# Patient Record
Sex: MALE | Race: WHITE | NOT HISPANIC OR LATINO | Employment: PART TIME | URBAN - METROPOLITAN AREA
[De-identification: names, ages, dates, MRNs, and addresses within clinical notes are randomized per-mention and may not be internally consistent; named-entity substitution may affect disease eponyms.]

---

## 2023-04-04 ENCOUNTER — HOSPITAL ENCOUNTER (INPATIENT)
Facility: HOSPITAL | Age: 53
LOS: 3 days | Discharge: HOME | DRG: 256 | End: 2023-04-07
Attending: INTERNAL MEDICINE | Admitting: INTERNAL MEDICINE
Payer: MEDICAID

## 2023-04-04 DIAGNOSIS — L97.519 TOE ULCER, RIGHT: ICD-10-CM

## 2023-04-04 DIAGNOSIS — S91.301A WOUND OF RIGHT FOOT: Primary | ICD-10-CM

## 2023-04-04 PROCEDURE — 63600175 PHARM REV CODE 636 W HCPCS: Performed by: INTERNAL MEDICINE

## 2023-04-04 PROCEDURE — 25000003 PHARM REV CODE 250: Performed by: INTERNAL MEDICINE

## 2023-04-04 PROCEDURE — 11000001 HC ACUTE MED/SURG PRIVATE ROOM

## 2023-04-04 RX ORDER — INSULIN ASPART 100 [IU]/ML
0-5 INJECTION, SOLUTION INTRAVENOUS; SUBCUTANEOUS
Status: DISCONTINUED | OUTPATIENT
Start: 2023-04-04 | End: 2023-04-05

## 2023-04-04 RX ORDER — GLUCAGON 1 MG
1 KIT INJECTION
Status: DISCONTINUED | OUTPATIENT
Start: 2023-04-04 | End: 2023-04-07 | Stop reason: HOSPADM

## 2023-04-04 RX ORDER — IBUPROFEN 200 MG
16 TABLET ORAL
Status: DISCONTINUED | OUTPATIENT
Start: 2023-04-04 | End: 2023-04-07 | Stop reason: HOSPADM

## 2023-04-04 RX ORDER — SODIUM CHLORIDE 9 MG/ML
INJECTION, SOLUTION INTRAVENOUS CONTINUOUS
Status: DISCONTINUED | OUTPATIENT
Start: 2023-04-04 | End: 2023-04-06

## 2023-04-04 RX ORDER — IBUPROFEN 200 MG
24 TABLET ORAL
Status: DISCONTINUED | OUTPATIENT
Start: 2023-04-04 | End: 2023-04-07 | Stop reason: HOSPADM

## 2023-04-04 RX ADMIN — SODIUM CHLORIDE: 9 INJECTION, SOLUTION INTRAVENOUS at 08:04

## 2023-04-04 RX ADMIN — INSULIN ASPART 2 UNITS: 100 INJECTION, SOLUTION INTRAVENOUS; SUBCUTANEOUS at 09:04

## 2023-04-04 RX ADMIN — PIPERACILLIN AND TAZOBACTAM 4.5 G: 4; .5 INJECTION, POWDER, LYOPHILIZED, FOR SOLUTION INTRAVENOUS; PARENTERAL at 09:04

## 2023-04-05 ENCOUNTER — ANESTHESIA (OUTPATIENT)
Dept: SURGERY | Facility: HOSPITAL | Age: 53
DRG: 256 | End: 2023-04-05
Payer: MEDICAID

## 2023-04-05 ENCOUNTER — ANESTHESIA EVENT (OUTPATIENT)
Dept: SURGERY | Facility: HOSPITAL | Age: 53
DRG: 256 | End: 2023-04-05
Payer: MEDICAID

## 2023-04-05 PROBLEM — R05.9 COUGH: Status: ACTIVE | Noted: 2023-04-05

## 2023-04-05 PROBLEM — E11.9 DIABETES MELLITUS, TYPE 2: Status: ACTIVE | Noted: 2023-04-05

## 2023-04-05 PROBLEM — D64.9 ANEMIA: Status: ACTIVE | Noted: 2023-04-05

## 2023-04-05 PROBLEM — I10 HYPERTENSION: Status: ACTIVE | Noted: 2023-04-05

## 2023-04-05 PROBLEM — D72.829 LEUKOCYTOSIS: Status: ACTIVE | Noted: 2023-04-05

## 2023-04-05 PROBLEM — S91.301A WOUND OF RIGHT FOOT: Status: ACTIVE | Noted: 2023-04-05

## 2023-04-05 PROBLEM — E88.09 HYPOALBUMINEMIA: Status: ACTIVE | Noted: 2023-04-05

## 2023-04-05 LAB
ALBUMIN SERPL BCP-MCNC: 1.9 G/DL (ref 3.5–5.2)
ALP SERPL-CCNC: 68 U/L (ref 55–135)
ALT SERPL W/O P-5'-P-CCNC: 17 U/L (ref 10–44)
ANION GAP SERPL CALC-SCNC: 10 MMOL/L (ref 8–16)
AST SERPL-CCNC: 11 U/L (ref 10–40)
BASOPHILS # BLD AUTO: 0.05 K/UL (ref 0–0.2)
BASOPHILS NFR BLD: 0.3 % (ref 0–1.9)
BILIRUB SERPL-MCNC: 0.3 MG/DL (ref 0.1–1)
BUN SERPL-MCNC: 12 MG/DL (ref 6–20)
CALCIUM SERPL-MCNC: 8.4 MG/DL (ref 8.7–10.5)
CHLORIDE SERPL-SCNC: 102 MMOL/L (ref 95–110)
CO2 SERPL-SCNC: 22 MMOL/L (ref 23–29)
CREAT SERPL-MCNC: 1.1 MG/DL (ref 0.5–1.4)
DIFFERENTIAL METHOD: ABNORMAL
EOSINOPHIL # BLD AUTO: 0.1 K/UL (ref 0–0.5)
EOSINOPHIL NFR BLD: 0.4 % (ref 0–8)
ERYTHROCYTE [DISTWIDTH] IN BLOOD BY AUTOMATED COUNT: 13.2 % (ref 11.5–14.5)
EST. GFR  (NO RACE VARIABLE): >60 ML/MIN/1.73 M^2
GLUCOSE SERPL-MCNC: 323 MG/DL (ref 70–110)
HCT VFR BLD AUTO: 31.1 % (ref 40–54)
HGB BLD-MCNC: 9.6 G/DL (ref 14–18)
IMM GRANULOCYTES # BLD AUTO: 0.19 K/UL (ref 0–0.04)
IMM GRANULOCYTES NFR BLD AUTO: 1.2 % (ref 0–0.5)
LYMPHOCYTES # BLD AUTO: 1.3 K/UL (ref 1–4.8)
LYMPHOCYTES NFR BLD: 8.3 % (ref 18–48)
MCH RBC QN AUTO: 24.3 PG (ref 27–31)
MCHC RBC AUTO-ENTMCNC: 30.9 G/DL (ref 32–36)
MCV RBC AUTO: 79 FL (ref 82–98)
MONOCYTES # BLD AUTO: 1.5 K/UL (ref 0.3–1)
MONOCYTES NFR BLD: 9.3 % (ref 4–15)
NEUTROPHILS # BLD AUTO: 12.9 K/UL (ref 1.8–7.7)
NEUTROPHILS NFR BLD: 80.5 % (ref 38–73)
NRBC BLD-RTO: 0 /100 WBC
PLATELET # BLD AUTO: 420 K/UL (ref 150–450)
PMV BLD AUTO: 8.9 FL (ref 9.2–12.9)
POCT GLUCOSE: 188 MG/DL (ref 70–110)
POCT GLUCOSE: 229 MG/DL (ref 70–110)
POCT GLUCOSE: 294 MG/DL (ref 70–110)
POCT GLUCOSE: 310 MG/DL (ref 70–110)
POTASSIUM SERPL-SCNC: 4.2 MMOL/L (ref 3.5–5.1)
PROT SERPL-MCNC: 6.5 G/DL (ref 6–8.4)
RBC # BLD AUTO: 3.95 M/UL (ref 4.6–6.2)
SODIUM SERPL-SCNC: 134 MMOL/L (ref 136–145)
WBC # BLD AUTO: 15.96 K/UL (ref 3.9–12.7)

## 2023-04-05 PROCEDURE — 63600175 PHARM REV CODE 636 W HCPCS: Performed by: NURSE ANESTHETIST, CERTIFIED REGISTERED

## 2023-04-05 PROCEDURE — 37000009 HC ANESTHESIA EA ADD 15 MINS: Performed by: SURGERY

## 2023-04-05 PROCEDURE — 01480 ANES OPEN PX LOWER L/A/F NOS: CPT | Mod: QZ,P3 | Performed by: NURSE ANESTHETIST, CERTIFIED REGISTERED

## 2023-04-05 PROCEDURE — 99499 NO LOS: ICD-10-PCS | Mod: ,,, | Performed by: SURGERY

## 2023-04-05 PROCEDURE — 25000003 PHARM REV CODE 250: Performed by: INTERNAL MEDICINE

## 2023-04-05 PROCEDURE — 99223 1ST HOSP IP/OBS HIGH 75: CPT | Mod: ,,, | Performed by: SURGERY

## 2023-04-05 PROCEDURE — 80053 COMPREHEN METABOLIC PANEL: CPT | Performed by: INTERNAL MEDICINE

## 2023-04-05 PROCEDURE — 11000001 HC ACUTE MED/SURG PRIVATE ROOM

## 2023-04-05 PROCEDURE — D9220AH HC ANESTHESIA PROFESSIONAL FEE: Mod: QZ,P3 | Performed by: NURSE ANESTHETIST, CERTIFIED REGISTERED

## 2023-04-05 PROCEDURE — 63600175 PHARM REV CODE 636 W HCPCS: Performed by: INTERNAL MEDICINE

## 2023-04-05 PROCEDURE — 85025 COMPLETE CBC W/AUTO DIFF WBC: CPT | Performed by: INTERNAL MEDICINE

## 2023-04-05 PROCEDURE — 25000003 PHARM REV CODE 250: Performed by: NURSE ANESTHETIST, CERTIFIED REGISTERED

## 2023-04-05 PROCEDURE — 88305 TISSUE EXAM BY PATHOLOGIST: CPT | Performed by: PATHOLOGY

## 2023-04-05 PROCEDURE — 37000008 HC ANESTHESIA 1ST 15 MINUTES: Performed by: SURGERY

## 2023-04-05 PROCEDURE — 25000003 PHARM REV CODE 250: Performed by: PHYSICIAN ASSISTANT

## 2023-04-05 PROCEDURE — 88305 TISSUE EXAM BY PATHOLOGIST: CPT | Mod: 26,,, | Performed by: PATHOLOGY

## 2023-04-05 PROCEDURE — 36000707: Performed by: SURGERY

## 2023-04-05 PROCEDURE — 99499 UNLISTED E&M SERVICE: CPT | Mod: ,,, | Performed by: SURGERY

## 2023-04-05 PROCEDURE — 88305 TISSUE EXAM BY PATHOLOGIST: ICD-10-PCS | Mod: 26,,, | Performed by: PATHOLOGY

## 2023-04-05 PROCEDURE — 71000033 HC RECOVERY, INTIAL HOUR: Performed by: SURGERY

## 2023-04-05 PROCEDURE — 36415 COLL VENOUS BLD VENIPUNCTURE: CPT | Performed by: INTERNAL MEDICINE

## 2023-04-05 PROCEDURE — 99223 PR INITIAL HOSPITAL CARE,LEVL III: ICD-10-PCS | Mod: ,,, | Performed by: SURGERY

## 2023-04-05 PROCEDURE — 36000706: Performed by: SURGERY

## 2023-04-05 RX ORDER — ACETAMINOPHEN 10 MG/ML
INJECTION, SOLUTION INTRAVENOUS
Status: DISCONTINUED | OUTPATIENT
Start: 2023-04-05 | End: 2023-04-05

## 2023-04-05 RX ORDER — ONDANSETRON HYDROCHLORIDE 2 MG/ML
INJECTION, SOLUTION INTRAMUSCULAR; INTRAVENOUS
Status: DISCONTINUED | OUTPATIENT
Start: 2023-04-05 | End: 2023-04-05

## 2023-04-05 RX ORDER — FENTANYL CITRATE 50 UG/ML
INJECTION, SOLUTION INTRAMUSCULAR; INTRAVENOUS
Status: DISCONTINUED | OUTPATIENT
Start: 2023-04-05 | End: 2023-04-05

## 2023-04-05 RX ORDER — INSULIN ASPART 100 [IU]/ML
0-10 INJECTION, SOLUTION INTRAVENOUS; SUBCUTANEOUS
Status: DISCONTINUED | OUTPATIENT
Start: 2023-04-05 | End: 2023-04-07 | Stop reason: HOSPADM

## 2023-04-05 RX ORDER — MIDAZOLAM HYDROCHLORIDE 1 MG/ML
INJECTION INTRAMUSCULAR; INTRAVENOUS
Status: DISCONTINUED | OUTPATIENT
Start: 2023-04-05 | End: 2023-04-05

## 2023-04-05 RX ORDER — LIDOCAINE HYDROCHLORIDE 20 MG/ML
INJECTION, SOLUTION EPIDURAL; INFILTRATION; INTRACAUDAL; PERINEURAL
Status: DISCONTINUED | OUTPATIENT
Start: 2023-04-05 | End: 2023-04-05

## 2023-04-05 RX ORDER — PROPOFOL 10 MG/ML
VIAL (ML) INTRAVENOUS
Status: DISCONTINUED | OUTPATIENT
Start: 2023-04-05 | End: 2023-04-05

## 2023-04-05 RX ORDER — AMLODIPINE BESYLATE 5 MG/1
5 TABLET ORAL DAILY
Status: DISCONTINUED | OUTPATIENT
Start: 2023-04-06 | End: 2023-04-06

## 2023-04-05 RX ORDER — ROCURONIUM BROMIDE 10 MG/ML
INJECTION, SOLUTION INTRAVENOUS
Status: DISCONTINUED | OUTPATIENT
Start: 2023-04-05 | End: 2023-04-05

## 2023-04-05 RX ORDER — SUCCINYLCHOLINE CHLORIDE 20 MG/ML
INJECTION INTRAMUSCULAR; INTRAVENOUS
Status: DISCONTINUED | OUTPATIENT
Start: 2023-04-05 | End: 2023-04-05

## 2023-04-05 RX ADMIN — INSULIN ASPART 2 UNITS: 100 INJECTION, SOLUTION INTRAVENOUS; SUBCUTANEOUS at 08:04

## 2023-04-05 RX ADMIN — PIPERACILLIN AND TAZOBACTAM 4.5 G: 4; .5 INJECTION, POWDER, LYOPHILIZED, FOR SOLUTION INTRAVENOUS; PARENTERAL at 12:04

## 2023-04-05 RX ADMIN — SODIUM CHLORIDE: 0.9 INJECTION, SOLUTION INTRAVENOUS at 02:04

## 2023-04-05 RX ADMIN — MIDAZOLAM HYDROCHLORIDE 2 MG: 1 INJECTION, SOLUTION INTRAMUSCULAR; INTRAVENOUS at 02:04

## 2023-04-05 RX ADMIN — PIPERACILLIN AND TAZOBACTAM 4.5 G: 4; .5 INJECTION, POWDER, LYOPHILIZED, FOR SOLUTION INTRAVENOUS; PARENTERAL at 08:04

## 2023-04-05 RX ADMIN — FENTANYL CITRATE 100 MCG: 0.05 INJECTION, SOLUTION INTRAMUSCULAR; INTRAVENOUS at 02:04

## 2023-04-05 RX ADMIN — ONDANSETRON 8 MG: 2 INJECTION INTRAMUSCULAR; INTRAVENOUS at 02:04

## 2023-04-05 RX ADMIN — ACETAMINOPHEN 1000 MG: 10 INJECTION, SOLUTION INTRAVENOUS at 02:04

## 2023-04-05 RX ADMIN — LIDOCAINE HYDROCHLORIDE 80 MG: 20 INJECTION, SOLUTION EPIDURAL; INFILTRATION; INTRACAUDAL; PERINEURAL at 02:04

## 2023-04-05 RX ADMIN — SODIUM CHLORIDE: 9 INJECTION, SOLUTION INTRAVENOUS at 04:04

## 2023-04-05 RX ADMIN — ROCURONIUM BROMIDE 5 MG: 10 INJECTION, SOLUTION INTRAVENOUS at 02:04

## 2023-04-05 RX ADMIN — VANCOMYCIN HYDROCHLORIDE 1500 MG: 1.5 INJECTION, POWDER, LYOPHILIZED, FOR SOLUTION INTRAVENOUS at 05:04

## 2023-04-05 RX ADMIN — INSULIN DETEMIR 10 UNITS: 100 INJECTION, SOLUTION SUBCUTANEOUS at 08:04

## 2023-04-05 RX ADMIN — SODIUM CHLORIDE: 9 INJECTION, SOLUTION INTRAVENOUS at 12:04

## 2023-04-05 RX ADMIN — SODIUM CHLORIDE: 9 INJECTION, SOLUTION INTRAVENOUS at 05:04

## 2023-04-05 RX ADMIN — SUCCINYLCHOLINE CHLORIDE 120 MG: 20 INJECTION, SOLUTION INTRAMUSCULAR; INTRAVENOUS at 02:04

## 2023-04-05 RX ADMIN — VANCOMYCIN HYDROCHLORIDE 1500 MG: 1.5 INJECTION, POWDER, LYOPHILIZED, FOR SOLUTION INTRAVENOUS at 03:04

## 2023-04-05 RX ADMIN — PROPOFOL 160 MG: 10 INJECTION, EMULSION INTRAVENOUS at 02:04

## 2023-04-05 RX ADMIN — INSULIN ASPART 2 UNITS: 100 INJECTION, SOLUTION INTRAVENOUS; SUBCUTANEOUS at 01:04

## 2023-04-05 RX ADMIN — INSULIN ASPART 3 UNITS: 100 INJECTION, SOLUTION INTRAVENOUS; SUBCUTANEOUS at 08:04

## 2023-04-05 RX ADMIN — PIPERACILLIN AND TAZOBACTAM 4.5 G: 4; .5 INJECTION, POWDER, LYOPHILIZED, FOR SOLUTION INTRAVENOUS; PARENTERAL at 04:04

## 2023-04-05 NOTE — HPI
Patient is a 53 year old male with medical history of HTN and prediabetes who presented to the ED with right foot wound.  Two and half weeks he stepped on a knob of a weedeater and notice a foot wound.  He soaked it in epson salt and it started to peel.  Wound progressed and noticed his toe turning black.  He has been in pain and having difficulty ambulating.  He has had chills but has not noticed any fever.      Admitted for right foot wound with general surgery consult for debridement.  Concern for OM on MRI foot.    Stared on IV fluids, IV vanc and IV zosyn.

## 2023-04-05 NOTE — PLAN OF CARE
Plan of care reviewed with pt. Pt voiced understanding. Pt AAO X 4.  No SOB or acute distress noted. Patient resting comfortably in bed. Fall/Safety maintained, bed low, locked, side rails up x 2, call bell within reach. All needs met at this time. Pt instructed to call with any new needs. Pt with slip resistant socks on; remains free of fall or injury.    Problem: Adult Inpatient Plan of Care  Goal: Plan of Care Review  Outcome: Ongoing, Progressing  Goal: Patient-Specific Goal (Individualized)  Outcome: Ongoing, Progressing  Goal: Absence of Hospital-Acquired Illness or Injury  Outcome: Ongoing, Progressing  Goal: Optimal Comfort and Wellbeing  Outcome: Ongoing, Progressing  Goal: Readiness for Transition of Care  Outcome: Ongoing, Progressing     Problem: Impaired Wound Healing  Goal: Optimal Wound Healing  Outcome: Ongoing, Progressing     Problem: Diabetes Comorbidity  Goal: Blood Glucose Level Within Targeted Range  Outcome: Ongoing, Progressing

## 2023-04-05 NOTE — CONSULTS
Consult Note    Consult Requested by:  Necrotic right 5th toe cellulitis right foot  Reason for Consult:  See above requested by primary care  SUBJECTIVE:     History of Present Illness:  Patient is a 53 y.o. male presents with swelling tenderness right foot saying he stepped on the tip of the weed eater about 2 weeks ago.  Went to [facility] diagnosed and treated and sent here because they had no beds.  Patient suspected had diabetes but has not been seeing a doctor had no insurance.  Now presents with swollen red foul-smelling right foot.  Review of patient's allergies indicates:  No Known Allergies    No past medical history on file.  No past surgical history on file.  No family history on file.       Review of Systems:  No recent issues    OBJECTIVE:     Vital Signs:  Temp:  [98 °F (36.7 °C)-99 °F (37.2 °C)]   Pulse:  []   Resp:  [19-20]   BP: (142-160)/(77-85)   SpO2:  [97 %-98 %]     Physical Exam:  In general patient is awake alert in no apparent distress.  Walking in the room was foul-smelling foot.  Right foot is markedly swollen lateral distal portion is red tender foul smelling.  This necrosis of the 5th toe the distal toe was black at the base of the toe is necrotic soft foul-smelling tissue.  Unable to palpate a pulse at the ankle we will recommend ABIs after surgery.    Laboratory:  Labs reviewed white count was elevated on admission now down but still elevated.  Chronic anemia noted.    Diagnostic Results:  Reviewed  X-rays showed no evidence of any fractures or foreign bodies.  ASSESSMENT/PLAN:   Impression impression is gangrene of the right 5th toe extending to the base of the toe.  Cellulitis possible abscess lateral aspect of the right foot.  Diabetes    Plan:  Patient's IV antibiotics.  We will taken operating room for amputation of the 5th toe and debridement of necrotic tissue.  Patient will probably require long-term antibiotics multiple wound changes and are debridements possible wound  VAC placement.  Patient is agreed to debridement and amputation he will signed consents be brought to the operating room today

## 2023-04-05 NOTE — ASSESSMENT & PLAN NOTE
ESR>120 and CRP>182  MRI right foot with edema and enhancement involving the 5th metatarsal head and the proximal phalanx of the 5th digit.  Reactive hyperemia versus low-grade osteomyelitis.Ulceration and edema in the soft tissues of the foot along the dorsal, lateral and plantar aspect of the foot at the level of the 5th digit and the plantar aspects of the 3rd and 4th digits.    IV vanc, IV zosyn and IV fluids  General surgery consult   Wound and blood cultures pending

## 2023-04-05 NOTE — ANESTHESIA PREPROCEDURE EVALUATION
04/05/2023  Willam Laurent is a 53 y.o., male.      Pre-op Assessment    I have reviewed the Patient Summary Reports.     I have reviewed the Nursing Notes. I have reviewed the NPO Status.   I have reviewed the Medications.     Review of Systems  Anesthesia Hx:  No problems with previous Anesthesia  History of prior surgery of interest to airway management or planning:  Denies Personal Hx of Anesthesia complications.   Social:  Non-Smoker, No Alcohol Use    Hematology/Oncology:  Hematology Normal   Oncology Normal     EENT/Dental:EENT/Dental Normal   Cardiovascular:   Hypertension, poorly controlled    Pulmonary:  Pulmonary Normal    Renal/:  Renal/ Normal     Hepatic/GI:  Hepatic/GI Normal    Musculoskeletal:  Musculoskeletal Normal    Neurological:  Neurology Normal    Endocrine:   Diabetes, poorly controlled, type 2, using insulin    Dermatological:   Diabetic foot/ toe infection for I&D and amputation   Psych:  Psychiatric Normal           Physical Exam  General: Well nourished, Cooperative, Alert and Oriented    Airway:  Mallampati: II   Mouth Opening: Normal  TM Distance: Normal  Tongue: Normal  Neck ROM: Normal ROM    Dental:  Intact        Anesthesia Plan  Type of Anesthesia, risks & benefits discussed:    Anesthesia Type: Gen Supraglottic Airway, Gen ETT  Intra-op Monitoring Plan: Standard ASA Monitors  Post Op Pain Control Plan: multimodal analgesia and IV/PO Opioids PRN  Induction:  IV  Airway Plan: Video, Post-Induction  Informed Consent: Informed consent signed with the Patient and all parties understand the risks and agree with anesthesia plan.  All questions answered. Patient consented to blood products? Yes  ASA Score: 3 Emergent  Day of Surgery Review of History & Physical: H&P Update referred to the surgeon/provider.I have interviewed and examined the patient. I have reviewed the  patient's H&P dated: 4/5/23. There are no significant changes.     Ready For Surgery From Anesthesia Perspective.     .

## 2023-04-05 NOTE — ASSESSMENT & PLAN NOTE
Not on home bp meds  Bp on higher side (component of IV fluids and pain)   Will start patient on low dose calcium channel blocker

## 2023-04-05 NOTE — ANESTHESIA PROCEDURE NOTES
Intubation    Date/Time: 4/5/2023 2:26 PM  Performed by: Mina Mcnulty CRNA  Authorized by: Mina Mcnulty CRNA     Intubation:     Induction:  Rapid sequence induction    Intubated:  Postinduction    Mask Ventilation:  N/a    Attempts:  1    Attempted By:  CRNA    Method of Intubation:  Video laryngoscopy    Blade:  Fuentes 4    Laryngeal View Grade: Grade I - full view of cords      Difficult Airway Encountered?: No      Complications:  None    Airway Device:  Oral endotracheal tube    Airway Device Size:  7.5    Style/Cuff Inflation:  Cuffed (inflated to minimal occlusive pressure)    Tube secured:  21    Secured at:  The lips    Placement Verified By:  Capnometry    Complicating Factors:  None    Findings Post-Intubation:  BS equal bilateral and atraumatic/condition of teeth unchanged

## 2023-04-05 NOTE — OP NOTE
[facility] Surg (3rd Fl)  Op Note    SUMMARY     Surgery Date: 4/5/2023     Surgeon(s) and Role:     * Nehemias Buckley MD - Primary    Assisting Surgeon: None    Pre-op Diagnosis:  Diabetic ulcer of toe of right foot [E11.621, L97.519]    Post-op Diagnosis:  Post-Op Diagnosis Codes:     * Diabetic ulcer of toe of right foot [E11.621, L97.519] gangrene of right 5th toe necrotic muscle fascia lateral portion of the right foot.    Procedure(s) (LRB):  IRRIGATION AND DEBRIDEMENT (FOOT) (Right)  AMPUTATION, FOOT (Right)    Amputation of the right 5th toe metatarsal partial amputation and distally.  Debridement of 9 x 5 cm necrotic soft tissue tendons fat and muscle.     Procedure patient prepped draped sterile fashion.  Necrotic tissue was removed on the skin level was then found that the patient had a opening on the plantar aspect extending up to the base of the 5th toe.  The 5th toe was necrotic the base of the 5th toe at an all necrotic tissue.  Adjacent 4th lateral interspace was also necrotic.  I elected to amputate the 5th toe and take the entire phalanx and removed the distal portion of the metatarsal.  Rongeur was used to amputate the 5th digit and the distal portion of the metatarsal.  Scissors were used to remove necrotic tendons.  Scalpel was used to remove necrotic soft tissues skin subcutaneous tissue and fat.  Final area was non by 5 cm.  Again there was an extension to the plantar aspect which was opened up over hemostat removed the skin over it.  Irrigation and hemostasis was obtained.  The wound was then packed with a Betadine gauze and covered for compression to prevent bleeding.  Blood loss about 10 cc counts were correct  Anesthesia: General    Estimated Blood Loss: * No values recorded between 4/5/2023  2:42 PM and 4/5/2023  3:04 PM *         Specimens:   Specimen (24h ago, onward)      None

## 2023-04-05 NOTE — H&P
[facility] Surg ([address] [place] [address] Medicine  History & Physical    Patient Name: Willam Laurent  MRN: [id number]  Patient Class: IP- Inpatient  Admission Date: 4/4/2023  Attending Physician: Theo Dela Cruz MD   Primary Care Provider: Theo Dela Cruz MD         Patient information was obtained from patient and ER records.     Subjective:     Principal Problem:Wound of right foot    Chief Complaint: No chief complaint on file.       HPI: Patient is a 53 year old male with medical history of HTN and prediabetes who presented to the ED with right foot wound.  Two and half weeks he stepped on a knob of a weedeater and notice a foot wound.  He soaked it in epson salt and it started to peel.  Wound progressed and noticed his toe turning black.  He has been in pain and having difficulty ambulating.  He has had chills but has not noticed any fever.      Admitted for right foot wound with general surgery consult for debridement.  Concern for OM on MRI foot.    Stared on IV fluids, IV vanc and IV zosyn.        No past medical history on file.    No past surgical history on file.    Review of patient's allergies indicates:  No Known Allergies    Current Facility-Administered Medications on File Prior to Encounter   Medication    [COMPLETED] gadobutroL (GADAVIST) injection 10 mL    [COMPLETED] sodium chloride 0.9% bolus 3,198 mL 3,198 mL    [COMPLETED] vancomycin (VANCOCIN) 2,500 mg in dextrose 5 % (D5W) 500 mL IVPB    [DISCONTINUED] piperacillin-tazobactam (ZOSYN) 4.5 g in dextrose 5 % in water (D5W) 5 % 100 mL IVPB (MB+)    [DISCONTINUED] piperacillin-tazobactam (ZOSYN) 4.5 g in dextrose 5 % in water (D5W) 5 % 100 mL IVPB (MB+)    [DISCONTINUED] piperacillin-tazobactam (ZOSYN) 4.5 g in dextrose 5 % in water (D5W) 5 % 100 mL IVPB (MB+)    [DISCONTINUED] vancomycin - pharmacy to dose    [DISCONTINUED] vancomycin 1,500 mg in dextrose 5 % (D5W) 250 mL IVPB (Vial-Mate)     No current outpatient medications on  file prior to encounter.     Family History    None       Tobacco Use    Smoking status: Not on file    Smokeless tobacco: Not on file   Substance and Sexual Activity    Alcohol use: Not on file    Drug use: Not on file    Sexual activity: Not on file     Review of Systems   Constitutional:  Positive for chills. Negative for fever.   Respiratory:  Positive for cough. Negative for shortness of breath.    Cardiovascular:  Negative for chest pain and leg swelling.   Gastrointestinal:  Negative for nausea and vomiting.   Genitourinary:  Negative for difficulty urinating and dysuria.   Musculoskeletal:  Positive for gait problem and joint swelling.   Skin:  Positive for wound (bilateral foot wounds r>>>Lright foot and 5th metatarsal).   Objective:     Vital Signs (Most Recent):  Temp: 99 °F (37.2 °C) (04/05/23 1119)  Pulse: 87 (04/05/23 1119)  Resp: 20 (04/05/23 1119)  BP: (!) 155/78 (04/05/23 1119)  SpO2: 98 % (04/05/23 1119)   Vital Signs (24h Range):  Temp:  [98 °F (36.7 °C)-99 °F (37.2 °C)] 99 °F (37.2 °C)  Pulse:  [] 87  Resp:  [16-23] 20  SpO2:  [94 %-98 %] 98 %  BP: (128-172)/(70-88) 155/78     Weight: 111 kg (244 lb 11.4 oz)  Body mass index is 34.13 kg/m².    Physical Exam  Constitutional:       Appearance: Normal appearance.   HENT:      Head: Normocephalic and atraumatic.   Cardiovascular:      Rate and Rhythm: Normal rate and regular rhythm.      Pulses: Normal pulses.      Heart sounds: Normal heart sounds.      Comments: Peripheral pulses intact   Pulmonary:      Breath sounds: Normal breath sounds.   Skin:     General: Skin is warm and dry.      Comments: Erythema around open wound with pus of right foot  Gangrene of right 5th metatarsal    Neurological:      Mental Status: He is alert.           Significant Labs: A1C:   Recent Labs   Lab 04/04/23  1224   HGBA1C 12.8*     ABGs:   Recent Labs   Lab 04/04/23  1403   BE 6.40*   TOTALHB 9.6*   COHB 1.7   METHB 0.4     Bilirubin:   Recent Labs   Lab  04/04/23  1224 04/05/23  0623   BILITOT 0.4 0.3     Blood Culture:   Recent Labs   Lab 04/04/23  1220 04/04/23  1227   LABBLOO No Growth to date No Growth to date     BMP:   Recent Labs   Lab 04/05/23  0623   *   *   K 4.2      CO2 22*   BUN 12   CREATININE 1.1   CALCIUM 8.4*     CBC:   Recent Labs   Lab 04/04/23  1224 04/05/23  0623   WBC 19.21* 15.96*   HGB 10.8* 9.6*   HCT 33.6* 31.1*   * 420     CMP:   Recent Labs   Lab 04/04/23  1224 04/05/23  0623   * 134*   K 3.9 4.2   CL 95 102   CO2 27 22*   * 323*   BUN 16 12   CREATININE 1.3 1.1   CALCIUM 9.2 8.4*   PROT 7.4 6.5   ALBUMIN 2.3* 1.9*   BILITOT 0.4 0.3   ALKPHOS 96 68   AST 17 11   ALT 19 17   ANIONGAP 9 10     Cardiac Markers: No results for input(s): CKMB, MYOGLOBIN, BNP, TROPISTAT in the last 48 hours.  Coagulation: No results for input(s): PT, INR, APTT in the last 48 hours.  Lactic Acid:   Recent Labs   Lab 04/04/23  1224   LACTATE 1.1     Lipase: No results for input(s): LIPASE in the last 48 hours.  Lipid Panel: No results for input(s): CHOL, HDL, LDLCALC, TRIG, CHOLHDL in the last 48 hours.  Magnesium: No results for input(s): MG in the last 48 hours.  Respiratory Culture: No results for input(s): GSRESP, RESPIRATORYC in the last 48 hours.  Troponin: No results for input(s): TROPONINI, TROPONINIHS in the last 48 hours.  Urine Culture: No results for input(s): LABURIN in the last 48 hours.  Urine Studies: No results for input(s): COLORU, APPEARANCEUA, PHUR, SPECGRAV, PROTEINUA, GLUCUA, KETONESU, BILIRUBINUA, OCCULTUA, NITRITE, UROBILINOGEN, LEUKOCYTESUR, RBCUA, WBCUA, BACTERIA, SQUAMEPITHEL, HYALINECASTS in the last 48 hours.    Invalid input(s): VANDANA    Significant Imaging:   MRI right foot  Mild degree of marrow edema with enhancement involving the 5th metatarsal head and the proximal phalanx of the 5th digit.  Differential diagnosis would include reactive hyperemia versus low-grade osteomyelitis.  The low  signal intensity cortex is well maintained with respect to both of these osseous structures.     Diminished signal intensity without enhancement involving the distal phalanx of the 5th digit may represent some degree of sclerosis involving this phalanx.     Ulceration and edema in the soft tissues of the foot along the dorsal, lateral and plantar aspect of the foot at the level of the 5th digit and the plantar aspects of the 3rd and 4th digits.     Moderate arthritic changes of the 1st MTP joint.    XRAY foot  Soft tissue swelling and contour irregularity/soft tissue wound lateral to the 5th metatarsal with no underlying acute bony abnormality or bony destruction appreciated.           Assessment/Plan:     * Wound of right foot  ESR>120 and CRP>182  MRI right foot with edema and enhancement involving the 5th metatarsal head and the proximal phalanx of the 5th digit.  Reactive hyperemia versus low-grade osteomyelitis.Ulceration and edema in the soft tissues of the foot along the dorsal, lateral and plantar aspect of the foot at the level of the 5th digit and the plantar aspects of the 3rd and 4th digits.    IV vanc, IV zosyn and IV fluids  General surgery consult   Wound and blood cultures pending          Cough  CXR pending       Hypertension  Not on home bp meds  Bp on higher side (component of IV fluids and pain)   Will start patient on low dose calcium channel blocker      Anemia  Hg 10.8 at admission  Currently 9.6  Trend with daily CBC   Anemia work up ordered       Hypoalbuminemia  Albumin 2.3 at admission  Dietician consulted       Leukocytosis  WBC 19.21 at admission  Currently 15.96  Continue to trend       Diabetes mellitus, type 2  Patient's FSGs are uncontrolled due to hyperglycemia on current medication regimen.  Last A1c reviewed-   Lab Results   Component Value Date    HGBA1C 12.8 (H) 04/04/2023     Most recent fingerstick glucose reviewed-   Recent Labs   Lab 04/05/23  0740 04/05/23  9475    POCTGLUCOSE 294* 229*     Current correctional scale  Medium  Maintain anti-hyperglycemic dose as follows-   Antihyperglycemics (From admission, onward)    Start     Stop Route Frequency Ordered    04/05/23 2100  insulin detemir U-100 (Levemir) pen 10 Units         -- SubQ Nightly 04/05/23 0817    04/05/23 0817  insulin aspart U-100 pen 0-10 Units         -- SubQ Before meals & nightly PRN 04/05/23 0817        Hold Oral hypoglycemics while patient is in the hospital.  New onset diabetes.  Diabetic educator consulted.          VTE Risk Mitigation (From admission, onward)    None                     Marianne Perry PA-C  Department of Hospital Medicine  [place] - Med Surg (3rd Fl)

## 2023-04-05 NOTE — NURSING
Patient transported to 3rd floor room 304 from [facility]. Patient is AAOx4. No complaints of chest pain or any other discomforts. Patient oriented to room, bed in low position, side rails up x2, call bell in reach.

## 2023-04-05 NOTE — TRANSFER OF CARE
"Anesthesia Transfer of Care Note    Patient: Willam Laurent    Procedure(s) Performed: Procedure(s) (LRB):  IRRIGATION AND DEBRIDEMENT (FOOT) (Right)  AMPUTATION, FOOT (Right)  AMPUTATION, TOE (Right)    Patient location: PACU    Anesthesia Type: general    Transport from OR: Transported from OR on room air with adequate spontaneous ventilation    Post pain: adequate analgesia    Post assessment: no apparent anesthetic complications and tolerated procedure well    Post vital signs: stable    Level of consciousness: awake and alert    Nausea/Vomiting: no nausea/vomiting    Complications: none    Transfer of care protocol was followed      Last vitals:   Visit Vitals  /61   Pulse 78   Temp 36.9 °C (98.4 °F) (Temporal)   Resp 18   Ht 5' 11" (1.803 m)   Wt 111 kg (244 lb 11.4 oz)   SpO2 (!) 93%   BMI 34.13 kg/m²     "

## 2023-04-05 NOTE — PROGRESS NOTES
[facility]           Pharmacy        Current Drug Shortage     Due to national backorder and [facility] is critically low on inventory of Dextrose 50% (D50) Syringes and Vials, pharmacy has automatically switched from D50% to D10% IVPB at the equivalent dose until resolution of the shortage per P&T approved protocol.               Stefania Hilario, PharmD  [phone]

## 2023-04-05 NOTE — ASSESSMENT & PLAN NOTE
Patient's FSGs are uncontrolled due to hyperglycemia on current medication regimen.  Last A1c reviewed-   Lab Results   Component Value Date    HGBA1C 12.8 (H) 04/04/2023     Most recent fingerstick glucose reviewed-   Recent Labs   Lab 04/05/23  0740 04/05/23  1157   POCTGLUCOSE 294* 229*     Current correctional scale  Medium  Maintain anti-hyperglycemic dose as follows-   Antihyperglycemics (From admission, onward)    Start     Stop Route Frequency Ordered    04/05/23 2100  insulin detemir U-100 (Levemir) pen 10 Units         -- SubQ Nightly 04/05/23 0817    04/05/23 0817  insulin aspart U-100 pen 0-10 Units         -- SubQ Before meals & nightly PRN 04/05/23 0817        Hold Oral hypoglycemics while patient is in the hospital.  New onset diabetes.  Diabetic educator consulted.

## 2023-04-05 NOTE — ANESTHESIA POSTPROCEDURE EVALUATION
Anesthesia Post Evaluation    Patient: Willam Laurent    Procedure(s) Performed: Procedure(s) (LRB):  IRRIGATION AND DEBRIDEMENT (FOOT) (Right)  AMPUTATION, 5th toe (Right)    Final Anesthesia Type: general      Patient location during evaluation: PACU  Patient participation: Yes- Able to Participate  Level of consciousness: awake and alert and oriented  Post-procedure vital signs: reviewed and stable  Pain management: adequate  Airway patency: patent    PONV status at discharge: No PONV  Anesthetic complications: no      Cardiovascular status: blood pressure returned to baseline and hemodynamically stable  Respiratory status: unassisted, spontaneous ventilation and room air  Hydration status: euvolemic  Follow-up not needed.          Vitals Value Taken Time   /60 04/05/23 1543   Temp 36.9 °C (98.4 °F) 04/05/23 1517   Pulse 75 04/05/23 1543   Resp 18 04/05/23 1543   SpO2 99 % 04/05/23 1543         Event Time   Out of Recovery 04/05/2023 15:44:39         Pain/Mally Score: Mally Score: 10 (4/5/2023  3:43 PM)

## 2023-04-05 NOTE — PLAN OF CARE
[facility] - Med Surg (3rd Fl)  Discharge Assessment    Primary Care Provider: Theo Dela Cruz MD     Discharge Assessment (most recent)       BRIEF DISCHARGE ASSESSMENT - 04/05/23 1058          Discharge Planning    Assessment Type Discharge Planning Brief Assessment     Resource/Environmental Concerns none     Current Living Arrangements home     Patient/Family Anticipates Transition to home     Patient/Family Anticipated Services at Transition none     DME Needed Upon Discharge  other (see comments)   TBD    Discharge Plan A --   TBD                    Discharge assessment completed. Patient's discharge needs are TBD at this time pending surgery this afternoon and plan of care going forward in regards to the potential need for extended antibiotics. SW to remain available.

## 2023-04-05 NOTE — SUBJECTIVE & OBJECTIVE
No past medical history on file.    No past surgical history on file.    Review of patient's allergies indicates:  No Known Allergies    Current Facility-Administered Medications on File Prior to Encounter   Medication    [COMPLETED] gadobutroL (GADAVIST) injection 10 mL    [COMPLETED] sodium chloride 0.9% bolus 3,198 mL 3,198 mL    [COMPLETED] vancomycin (VANCOCIN) 2,500 mg in dextrose 5 % (D5W) 500 mL IVPB    [DISCONTINUED] piperacillin-tazobactam (ZOSYN) 4.5 g in dextrose 5 % in water (D5W) 5 % 100 mL IVPB (MB+)    [DISCONTINUED] piperacillin-tazobactam (ZOSYN) 4.5 g in dextrose 5 % in water (D5W) 5 % 100 mL IVPB (MB+)    [DISCONTINUED] piperacillin-tazobactam (ZOSYN) 4.5 g in dextrose 5 % in water (D5W) 5 % 100 mL IVPB (MB+)    [DISCONTINUED] vancomycin - pharmacy to dose    [DISCONTINUED] vancomycin 1,500 mg in dextrose 5 % (D5W) 250 mL IVPB (Vial-Mate)     No current outpatient medications on file prior to encounter.     Family History    None       Tobacco Use    Smoking status: Not on file    Smokeless tobacco: Not on file   Substance and Sexual Activity    Alcohol use: Not on file    Drug use: Not on file    Sexual activity: Not on file     Review of Systems   Constitutional:  Positive for chills. Negative for fever.   Respiratory:  Positive for cough. Negative for shortness of breath.    Cardiovascular:  Negative for chest pain and leg swelling.   Gastrointestinal:  Negative for nausea and vomiting.   Genitourinary:  Negative for difficulty urinating and dysuria.   Musculoskeletal:  Positive for gait problem and joint swelling.   Skin:  Positive for wound (bilateral foot wounds r>>>Lright foot and 5th metatarsal).   Objective:     Vital Signs (Most Recent):  Temp: 99 °F (37.2 °C) (04/05/23 1119)  Pulse: 87 (04/05/23 1119)  Resp: 20 (04/05/23 1119)  BP: (!) 155/78 (04/05/23 1119)  SpO2: 98 % (04/05/23 1119)   Vital Signs (24h Range):  Temp:  [98 °F (36.7 °C)-99 °F (37.2 °C)] 99 °F (37.2 °C)  Pulse:   [] 87  Resp:  [16-23] 20  SpO2:  [94 %-98 %] 98 %  BP: (128-172)/(70-88) 155/78     Weight: 111 kg (244 lb 11.4 oz)  Body mass index is 34.13 kg/m².    Physical Exam  Constitutional:       Appearance: Normal appearance.   HENT:      Head: Normocephalic and atraumatic.   Cardiovascular:      Rate and Rhythm: Normal rate and regular rhythm.      Pulses: Normal pulses.      Heart sounds: Normal heart sounds.      Comments: Peripheral pulses intact   Pulmonary:      Breath sounds: Normal breath sounds.   Skin:     General: Skin is warm and dry.      Comments: Erythema around open wound with pus of right foot  Gangrene of right 5th metatarsal    Neurological:      Mental Status: He is alert.           Significant Labs: A1C:   Recent Labs   Lab 04/04/23  1224   HGBA1C 12.8*     ABGs:   Recent Labs   Lab 04/04/23  1403   BE 6.40*   TOTALHB 9.6*   COHB 1.7   METHB 0.4     Bilirubin:   Recent Labs   Lab 04/04/23  1224 04/05/23  0623   BILITOT 0.4 0.3     Blood Culture:   Recent Labs   Lab 04/04/23  1220 04/04/23  1227   LABBLOO No Growth to date No Growth to date     BMP:   Recent Labs   Lab 04/05/23  0623   *   *   K 4.2      CO2 22*   BUN 12   CREATININE 1.1   CALCIUM 8.4*     CBC:   Recent Labs   Lab 04/04/23 1224 04/05/23  0623   WBC 19.21* 15.96*   HGB 10.8* 9.6*   HCT 33.6* 31.1*   * 420     CMP:   Recent Labs   Lab 04/04/23  1224 04/05/23  0623   * 134*   K 3.9 4.2   CL 95 102   CO2 27 22*   * 323*   BUN 16 12   CREATININE 1.3 1.1   CALCIUM 9.2 8.4*   PROT 7.4 6.5   ALBUMIN 2.3* 1.9*   BILITOT 0.4 0.3   ALKPHOS 96 68   AST 17 11   ALT 19 17   ANIONGAP 9 10     Cardiac Markers: No results for input(s): CKMB, MYOGLOBIN, BNP, TROPISTAT in the last 48 hours.  Coagulation: No results for input(s): PT, INR, APTT in the last 48 hours.  Lactic Acid:   Recent Labs   Lab 04/04/23  1224   LACTATE 1.1     Lipase: No results for input(s): LIPASE in the last 48 hours.  Lipid Panel: No  results for input(s): CHOL, HDL, LDLCALC, TRIG, CHOLHDL in the last 48 hours.  Magnesium: No results for input(s): MG in the last 48 hours.  Respiratory Culture: No results for input(s): GSRESP, RESPIRATORYC in the last 48 hours.  Troponin: No results for input(s): TROPONINI, TROPONINIHS in the last 48 hours.  Urine Culture: No results for input(s): LABURIN in the last 48 hours.  Urine Studies: No results for input(s): COLORU, APPEARANCEUA, PHUR, SPECGRAV, PROTEINUA, GLUCUA, KETONESU, BILIRUBINUA, OCCULTUA, NITRITE, UROBILINOGEN, LEUKOCYTESUR, RBCUA, WBCUA, BACTERIA, SQUAMEPITHEL, HYALINECASTS in the last 48 hours.    Invalid input(s): WRIGHTSUR    Significant Imaging:   MRI right foot  Mild degree of marrow edema with enhancement involving the 5th metatarsal head and the proximal phalanx of the 5th digit.  Differential diagnosis would include reactive hyperemia versus low-grade osteomyelitis.  The low signal intensity cortex is well maintained with respect to both of these osseous structures.     Diminished signal intensity without enhancement involving the distal phalanx of the 5th digit may represent some degree of sclerosis involving this phalanx.     Ulceration and edema in the soft tissues of the foot along the dorsal, lateral and plantar aspect of the foot at the level of the 5th digit and the plantar aspects of the 3rd and 4th digits.     Moderate arthritic changes of the 1st MTP joint.    XRAY foot  Soft tissue swelling and contour irregularity/soft tissue wound lateral to the 5th metatarsal with no underlying acute bony abnormality or bony destruction appreciated.

## 2023-04-06 LAB
ALBUMIN SERPL BCP-MCNC: 2 G/DL (ref 3.5–5.2)
ALP SERPL-CCNC: 76 U/L (ref 55–135)
ALT SERPL W/O P-5'-P-CCNC: 18 U/L (ref 10–44)
ANION GAP SERPL CALC-SCNC: 8 MMOL/L (ref 8–16)
AST SERPL-CCNC: 13 U/L (ref 10–40)
BASOPHILS # BLD AUTO: 0.06 K/UL (ref 0–0.2)
BASOPHILS NFR BLD: 0.4 % (ref 0–1.9)
BILIRUB SERPL-MCNC: 0.3 MG/DL (ref 0.1–1)
BUN SERPL-MCNC: 10 MG/DL (ref 6–20)
CALCIUM SERPL-MCNC: 8.5 MG/DL (ref 8.7–10.5)
CHLORIDE SERPL-SCNC: 104 MMOL/L (ref 95–110)
CO2 SERPL-SCNC: 24 MMOL/L (ref 23–29)
CREAT SERPL-MCNC: 1.2 MG/DL (ref 0.5–1.4)
DIFFERENTIAL METHOD: ABNORMAL
EOSINOPHIL # BLD AUTO: 0.2 K/UL (ref 0–0.5)
EOSINOPHIL NFR BLD: 1.1 % (ref 0–8)
ERYTHROCYTE [DISTWIDTH] IN BLOOD BY AUTOMATED COUNT: 13.4 % (ref 11.5–14.5)
EST. GFR  (NO RACE VARIABLE): >60 ML/MIN/1.73 M^2
FERRITIN SERPL-MCNC: 641 NG/ML (ref 20–300)
FOLATE SERPL-MCNC: 14.4 NG/ML (ref 4–24)
GLUCOSE SERPL-MCNC: 254 MG/DL (ref 70–110)
HCT VFR BLD AUTO: 32.5 % (ref 40–54)
HGB BLD-MCNC: 10.1 G/DL (ref 14–18)
IMM GRANULOCYTES # BLD AUTO: 0.19 K/UL (ref 0–0.04)
IMM GRANULOCYTES NFR BLD AUTO: 1.3 % (ref 0–0.5)
IRON SERPL-MCNC: 14 UG/DL (ref 45–160)
LYMPHOCYTES # BLD AUTO: 1.3 K/UL (ref 1–4.8)
LYMPHOCYTES NFR BLD: 8.8 % (ref 18–48)
MAGNESIUM SERPL-MCNC: 1.5 MG/DL (ref 1.6–2.6)
MCH RBC QN AUTO: 24.6 PG (ref 27–31)
MCHC RBC AUTO-ENTMCNC: 31.1 G/DL (ref 32–36)
MCV RBC AUTO: 79 FL (ref 82–98)
MONOCYTES # BLD AUTO: 1.1 K/UL (ref 0.3–1)
MONOCYTES NFR BLD: 7.7 % (ref 4–15)
NEUTROPHILS # BLD AUTO: 11.8 K/UL (ref 1.8–7.7)
NEUTROPHILS NFR BLD: 80.7 % (ref 38–73)
NRBC BLD-RTO: 0 /100 WBC
PLATELET # BLD AUTO: 480 K/UL (ref 150–450)
PLATELET BLD QL SMEAR: ABNORMAL
PMV BLD AUTO: 9.6 FL (ref 9.2–12.9)
POCT GLUCOSE: 213 MG/DL (ref 70–110)
POCT GLUCOSE: 222 MG/DL (ref 70–110)
POCT GLUCOSE: 225 MG/DL (ref 70–110)
POCT GLUCOSE: 310 MG/DL (ref 70–110)
POTASSIUM SERPL-SCNC: 4.1 MMOL/L (ref 3.5–5.1)
PROT SERPL-MCNC: 7 G/DL (ref 6–8.4)
RBC # BLD AUTO: 4.11 M/UL (ref 4.6–6.2)
SATURATED IRON: 8 % (ref 20–50)
SODIUM SERPL-SCNC: 136 MMOL/L (ref 136–145)
TOTAL IRON BINDING CAPACITY: 170 UG/DL (ref 250–450)
TRANSFERRIN SERPL-MCNC: 115 MG/DL (ref 200–375)
VANCOMYCIN TROUGH SERPL-MCNC: 15.3 UG/ML (ref 10–22)
VIT B12 SERPL-MCNC: 1772 PG/ML (ref 210–950)
WBC # BLD AUTO: 14.61 K/UL (ref 3.9–12.7)

## 2023-04-06 PROCEDURE — 99233 PR SUBSEQUENT HOSPITAL CARE,LEVL III: ICD-10-PCS | Mod: 95,,, | Performed by: PHYSICIAN ASSISTANT

## 2023-04-06 PROCEDURE — 99233 SBSQ HOSP IP/OBS HIGH 50: CPT | Mod: 95,,, | Performed by: PHYSICIAN ASSISTANT

## 2023-04-06 PROCEDURE — 25000003 PHARM REV CODE 250: Performed by: INTERNAL MEDICINE

## 2023-04-06 PROCEDURE — 80053 COMPREHEN METABOLIC PANEL: CPT | Performed by: PHYSICIAN ASSISTANT

## 2023-04-06 PROCEDURE — 11000001 HC ACUTE MED/SURG PRIVATE ROOM

## 2023-04-06 PROCEDURE — 63600175 PHARM REV CODE 636 W HCPCS: Performed by: INTERNAL MEDICINE

## 2023-04-06 PROCEDURE — 82746 ASSAY OF FOLIC ACID SERUM: CPT | Performed by: PHYSICIAN ASSISTANT

## 2023-04-06 PROCEDURE — 63600175 PHARM REV CODE 636 W HCPCS: Performed by: PHYSICIAN ASSISTANT

## 2023-04-06 PROCEDURE — 85025 COMPLETE CBC W/AUTO DIFF WBC: CPT | Performed by: PHYSICIAN ASSISTANT

## 2023-04-06 PROCEDURE — 87147 CULTURE TYPE IMMUNOLOGIC: CPT | Performed by: PHYSICIAN ASSISTANT

## 2023-04-06 PROCEDURE — 84466 ASSAY OF TRANSFERRIN: CPT | Performed by: PHYSICIAN ASSISTANT

## 2023-04-06 PROCEDURE — 80202 ASSAY OF VANCOMYCIN: CPT | Performed by: INTERNAL MEDICINE

## 2023-04-06 PROCEDURE — 25000003 PHARM REV CODE 250: Performed by: PHYSICIAN ASSISTANT

## 2023-04-06 PROCEDURE — 84425 ASSAY OF VITAMIN B-1: CPT | Performed by: PHYSICIAN ASSISTANT

## 2023-04-06 PROCEDURE — 82607 VITAMIN B-12: CPT | Performed by: PHYSICIAN ASSISTANT

## 2023-04-06 PROCEDURE — 82728 ASSAY OF FERRITIN: CPT | Performed by: PHYSICIAN ASSISTANT

## 2023-04-06 PROCEDURE — 25000003 PHARM REV CODE 250: Performed by: FAMILY MEDICINE

## 2023-04-06 PROCEDURE — 83735 ASSAY OF MAGNESIUM: CPT | Performed by: PHYSICIAN ASSISTANT

## 2023-04-06 PROCEDURE — 87070 CULTURE OTHR SPECIMN AEROBIC: CPT | Performed by: PHYSICIAN ASSISTANT

## 2023-04-06 RX ORDER — LOSARTAN POTASSIUM 25 MG/1
25 TABLET ORAL DAILY
Status: DISCONTINUED | OUTPATIENT
Start: 2023-04-07 | End: 2023-04-07

## 2023-04-06 RX ORDER — NIFEDIPINE 30 MG/1
30 TABLET, EXTENDED RELEASE ORAL DAILY
Status: DISCONTINUED | OUTPATIENT
Start: 2023-04-07 | End: 2023-04-07 | Stop reason: HOSPADM

## 2023-04-06 RX ORDER — LANOLIN ALCOHOL/MO/W.PET/CERES
1 CREAM (GRAM) TOPICAL DAILY
Status: DISCONTINUED | OUTPATIENT
Start: 2023-04-07 | End: 2023-04-07 | Stop reason: HOSPADM

## 2023-04-06 RX ORDER — ACETAMINOPHEN 325 MG/1
650 TABLET ORAL EVERY 6 HOURS PRN
Status: DISCONTINUED | OUTPATIENT
Start: 2023-04-06 | End: 2023-04-07 | Stop reason: HOSPADM

## 2023-04-06 RX ORDER — MAGNESIUM SULFATE HEPTAHYDRATE 40 MG/ML
2 INJECTION, SOLUTION INTRAVENOUS ONCE
Status: COMPLETED | OUTPATIENT
Start: 2023-04-06 | End: 2023-04-06

## 2023-04-06 RX ADMIN — INSULIN ASPART 2 UNITS: 100 INJECTION, SOLUTION INTRAVENOUS; SUBCUTANEOUS at 04:04

## 2023-04-06 RX ADMIN — PIPERACILLIN AND TAZOBACTAM 4.5 G: 4; .5 INJECTION, POWDER, LYOPHILIZED, FOR SOLUTION INTRAVENOUS; PARENTERAL at 08:04

## 2023-04-06 RX ADMIN — INSULIN ASPART 2 UNITS: 100 INJECTION, SOLUTION INTRAVENOUS; SUBCUTANEOUS at 08:04

## 2023-04-06 RX ADMIN — ACETAMINOPHEN 650 MG: 325 TABLET ORAL at 06:04

## 2023-04-06 RX ADMIN — PIPERACILLIN AND TAZOBACTAM 4.5 G: 4; .5 INJECTION, POWDER, LYOPHILIZED, FOR SOLUTION INTRAVENOUS; PARENTERAL at 12:04

## 2023-04-06 RX ADMIN — PIPERACILLIN AND TAZOBACTAM 4.5 G: 4; .5 INJECTION, POWDER, LYOPHILIZED, FOR SOLUTION INTRAVENOUS; PARENTERAL at 05:04

## 2023-04-06 RX ADMIN — VANCOMYCIN HYDROCHLORIDE 1500 MG: 1.5 INJECTION, POWDER, LYOPHILIZED, FOR SOLUTION INTRAVENOUS at 06:04

## 2023-04-06 RX ADMIN — INSULIN ASPART 2 UNITS: 100 INJECTION, SOLUTION INTRAVENOUS; SUBCUTANEOUS at 12:04

## 2023-04-06 RX ADMIN — AMLODIPINE BESYLATE 5 MG: 5 TABLET ORAL at 08:04

## 2023-04-06 RX ADMIN — MAGNESIUM SULFATE HEPTAHYDRATE 2 G: 40 INJECTION, SOLUTION INTRAVENOUS at 08:04

## 2023-04-06 RX ADMIN — VANCOMYCIN HYDROCHLORIDE 1500 MG: 1.5 INJECTION, POWDER, LYOPHILIZED, FOR SOLUTION INTRAVENOUS at 07:04

## 2023-04-06 NOTE — PROGRESS NOTES
[facility] ([address] [place] [address] Medicine  Progress Note    Patient Name: Willam Laurent  MRN: [id number]  Patient Class: IP- Inpatient   Admission Date: 4/4/2023  Length of Stay: 2 days  Attending Physician: Theo Dela Cruz MD  Primary Care Provider: Alpa Santana DO        Subjective:     Principal Problem:Wound of right foot        HPI:  Patient is a 53 year old male with medical history of HTN and prediabetes who presented to the ED with right foot wound.  Two and half weeks he stepped on a knob of a weedeater and notice a foot wound.  He soaked it in epson salt and it started to peel.  Wound progressed and noticed his toe turning black.  He has been in pain and having difficulty ambulating.  He has had chills but has not noticed any fever.      Admitted for right foot wound with general surgery consult for debridement.  Concern for OM on MRI foot.    Stared on IV fluids, IV vanc and IV zosyn.        Overview/Hospital Course:  Patient went to OR yesterday for debridement of right foot necrotic soft tissue, tendons, fat and muscle and amputation of 5th metatarsal.  Continue wound care.  HD stable on IV abx.  Wound culture not collected.  Will continue IV abx and will likely discharge home with course of levofloxacin.  IV magnesium ordered for hypomagnesemia.  Adjusted bp medications due to continue hypertension. Started procardia and losartan.  WIll d/c IV fluids.      Increased insulin from 10 to 14.  Glucose 250's.        History reviewed. No pertinent past medical history.    History reviewed. No pertinent surgical history.    Review of patient's allergies indicates:  No Known Allergies    No current facility-administered medications on file prior to encounter.     No current outpatient medications on file prior to encounter.     Family History    None       Tobacco Use    Smoking status: Not on file    Smokeless tobacco: Not on file   Substance and Sexual Activity    Alcohol use: Not on file     Drug use: Not on file    Sexual activity: Not on file     Review of Systems   Constitutional:  Negative for chills and fever.   Respiratory:  Positive for cough. Negative for shortness of breath.    Cardiovascular:  Negative for chest pain and leg swelling.   Gastrointestinal:  Negative for nausea and vomiting.   Genitourinary:  Negative for difficulty urinating and dysuria.   Musculoskeletal:  Positive for gait problem and joint swelling.   Skin:  Positive for wound (bilateral foot wounds r>>>Lright foot and 5th metatarsal).   Objective:     Vital Signs (Most Recent):  Temp: 98.8 °F (37.1 °C) (04/06/23 1112)  Pulse: 90 (04/06/23 1112)  Resp: 18 (04/06/23 1112)  BP: (!) 166/83 (04/06/23 1112)  SpO2: 97 % (04/06/23 1112)   Vital Signs (24h Range):  Temp:  [96.8 °F (36 °C)-99.9 °F (37.7 °C)] 98.8 °F (37.1 °C)  Pulse:  [71-97] 90  Resp:  [17-18] 18  SpO2:  [93 %-99 %] 97 %  BP: (106-193)/() 166/83     Weight: 111 kg (244 lb 11.4 oz)  Body mass index is 34.13 kg/m².    Physical Exam  Constitutional:       Appearance: Normal appearance.   HENT:      Head: Normocephalic and atraumatic.   Cardiovascular:      Rate and Rhythm: Normal rate and regular rhythm.      Pulses: Normal pulses.      Heart sounds: Normal heart sounds.      Comments: Peripheral pulses intact   Pulmonary:      Breath sounds: Normal breath sounds.   Skin:     General: Skin is warm and dry.      Comments: Erythema around open wound with pus of right foot  Gangrene of right 5th metatarsal   Currently bandaged post op   Neurological:      Mental Status: He is alert.           Significant Labs: A1C:   Recent Labs   Lab 04/04/23  1224   HGBA1C 12.8*       ABGs:   No results for input(s): PH, PCO2, HCO3, POCSATURATED, BE, TOTALHB, COHB, METHB, O2HB, POCFIO2, PO2 in the last 48 hours.    Bilirubin:   Recent Labs   Lab 04/04/23  1224 04/05/23  0623 04/06/23  0403   BILITOT 0.4 0.3 0.3       Blood Culture:   No results for input(s): LABBLOO in the last  48 hours.    BMP:   Recent Labs   Lab 04/06/23  0403   *      K 4.1      CO2 24   BUN 10   CREATININE 1.2   CALCIUM 8.5*   MG 1.5*       CBC:   Recent Labs   Lab 04/05/23 0623 04/06/23  0403   WBC 15.96* 14.61*   HGB 9.6* 10.1*   HCT 31.1* 32.5*    480*       CMP:   Recent Labs   Lab 04/05/23 0623 04/06/23  0403   * 136   K 4.2 4.1    104   CO2 22* 24   * 254*   BUN 12 10   CREATININE 1.1 1.2   CALCIUM 8.4* 8.5*   PROT 6.5 7.0   ALBUMIN 1.9* 2.0*   BILITOT 0.3 0.3   ALKPHOS 68 76   AST 11 13   ALT 17 18   ANIONGAP 10 8       Cardiac Markers: No results for input(s): CKMB, MYOGLOBIN, BNP, TROPISTAT in the last 48 hours.  Coagulation: No results for input(s): PT, INR, APTT in the last 48 hours.  Lactic Acid:   No results for input(s): LACTATE in the last 48 hours.    Lipase: No results for input(s): LIPASE in the last 48 hours.  Lipid Panel: No results for input(s): CHOL, HDL, LDLCALC, TRIG, CHOLHDL in the last 48 hours.  Magnesium:   Recent Labs   Lab 04/06/23  0403   MG 1.5*     Respiratory Culture: No results for input(s): GSRESP, RESPIRATORYC in the last 48 hours.  Troponin: No results for input(s): TROPONINI, TROPONINIHS in the last 48 hours.  Urine Culture: No results for input(s): LABURIN in the last 48 hours.  Urine Studies: No results for input(s): COLORU, APPEARANCEUA, PHUR, SPECGRAV, PROTEINUA, GLUCUA, KETONESU, BILIRUBINUA, OCCULTUA, NITRITE, UROBILINOGEN, LEUKOCYTESUR, RBCUA, WBCUA, BACTERIA, SQUAMEPITHEL, HYALINECASTS in the last 48 hours.    Invalid input(s): WRIGHTSUR    Significant Imaging:   MRI right foot  Mild degree of marrow edema with enhancement involving the 5th metatarsal head and the proximal phalanx of the 5th digit.  Differential diagnosis would include reactive hyperemia versus low-grade osteomyelitis.  The low signal intensity cortex is well maintained with respect to both of these osseous structures.     Diminished signal intensity without  enhancement involving the distal phalanx of the 5th digit may represent some degree of sclerosis involving this phalanx.     Ulceration and edema in the soft tissues of the foot along the dorsal, lateral and plantar aspect of the foot at the level of the 5th digit and the plantar aspects of the 3rd and 4th digits.     Moderate arthritic changes of the 1st MTP joint.    XRAY foot  Soft tissue swelling and contour irregularity/soft tissue wound lateral to the 5th metatarsal with no underlying acute bony abnormality or bony destruction appreciated.             Assessment/Plan:      * Wound of right foot  ESR>120 and CRP>182    MRI right foot with edema and enhancement involving the 5th metatarsal head and the proximal phalanx of the 5th digit.  Reactive hyperemia versus low-grade osteomyelitis.Ulceration and edema in the soft tissues of the foot along the dorsal, lateral and plantar aspect of the foot at the level of the 5th digit and the plantar aspects of the 3rd and 4th digits.    Venous u/s right leg: negative for DVT     IV vanc, IV zosyn   D/C IV fluids  General surgery consulted and debridement in the OR 4/5 with right toe amputation    Wound and blood cultures pending          Cough  CXR with cardiomegaly but no consolidation present.        Hypertension  Not on home bp meds  Bp on higher side (component of IV fluids and pain)   D/c IV fluids.  Patient not in pain and bp still remains elevated  Will start procardia 30 and losartan         Anemia  Hg 10.8 at admission  Currently 9.6  Trend with daily CBC   Anemia work up ordered   Iron levels low.  Will order po iron.  Unable to get IV.  Suspect ferritin elevated in the setting of inflammation.        Hypoalbuminemia  Albumin 2.3 at admission  Dietician consulted       Leukocytosis  WBC 19.21 at admission  Currently 14.61      Diabetes mellitus, type 2  Patient's FSGs are uncontrolled due to hyperglycemia on current medication regimen.  Last A1c reviewed-   Lab  Results   Component Value Date    HGBA1C 12.8 (H) 04/04/2023     Most recent fingerstick glucose reviewed-   Recent Labs   Lab 04/05/23  1609 04/05/23  1955 04/06/23  0712 04/06/23  1111   POCTGLUCOSE 188* 310* 213* 222*     Current correctional scale  Medium  Increased anti-hyperglycemic dose as follows-   Antihyperglycemics (From admission, onward)      Start     Stop Route Frequency Ordered    04/06/23 2100  insulin detemir U-100 (Levemir) pen 14 Units         -- SubQ Nightly 04/06/23 1518    04/05/23 0817  insulin aspart U-100 pen 0-10 Units         -- SubQ Before meals & nightly PRN 04/05/23 0817          Hold Oral hypoglycemics while patient is in the hospital.  New onset diabetes.  Diabetic educator consulted.      Hypomagnesemia  1.5 on am labs  IV magnesium ordered  Repeat Mg tomorrow       VTE Risk Mitigation (From admission, onward)      None            Discharge Planning   SORAIDA:      Code Status: Not on file   Is the patient medically ready for discharge?:     Reason for patient still in hospital (select all that apply): Patient trending condition.  Possibly discharge within the next 24-48 hours.    Discharge Plan A: Home                  Marianne Perry PA-C  Department of Hospital Medicine   [place] [facility] ([facility])

## 2023-04-06 NOTE — CONSULTS
[facility] consulted for home health. Home health unable to be obtained for this patient due to payor. Furthermore, he is not homebound. Patient will be set-up with the wound care clinic at [facility]. SW to remain available.

## 2023-04-06 NOTE — PROGRESS NOTES
[facility] - Med Surg (3rd Fl)  Adult Nutrition  Consult Note    SUMMARY     Recommendations    Recommendation/Intervention:     1) Rec'd cont diabetic diet.     2) Rec'd cont Boost Glucose Control BID to provide an additional 380 kcals and 32 g PRO.     3) Rec'd honoring pt food preferences to encourage PO intake.     4) Rec'd frequent weight measurements to assess for any acute weight changes.     5) RD to follow and make rec's accordingly.    Goals: Pt will meet at least 75% ENN by RD follow-up  Nutrition Goal Status: new  Communication of RD Recs: other (comment) (POC)    Assessment and Plan    Nutrition Problem  Inadequate energy intake    Related to (etiology):   Increased energy needs s/t wound status    Signs and Symptoms (as evidenced by):   Hospitalized for wound on right foot, NPO status x 1 day, & meeting 50 - 75% EEN at this time.     Interventions/Recommendations (treatment strategy):  Recommendation/Intervention:     1) Rec'd cont diabetic diet.     2) Rec'd cont Boost Glucose Control BID to provide an additional 380 kcals and 32 g PRO.     3) Rec'd honoring pt food preferences to encourage PO intake.     4) Rec'd frequent weight measurements to assess for any acute weight changes.     5) RD to follow and make rec's accordingly.    Goals: Pt will meet at least 75% ENN by RD follow-up  Nutrition Goal Status: new    Nutrition Diagnosis Status:   New      Malnutrition Assessment                                       Reason for Assessment    Reason For Assessment: consult (hypoalbuminemia)  Diagnosis: surgery/postoperative complications (Wound of right foot)  Relevant Medical History: T2DM, HTN, anemia  Interdisciplinary Rounds: did not attend  General Information Comments: 52 yo M pt on diabetic diet being treated for wound on right foot. Consult for hypoalbuminemia. Serum levels of albumin and prealbumin do not change in response to nutrient intake and are no longer considered an indicator of  "malnutrition, but rather an indicator of inflammation, morbidity, and mortality. Noted MST score of 2 for wt loss of 2-13 lbs and decreased appetite. Pt NPO post-op on . Diet upgraded . Pt has increased needs due to wound healing. Pt consuming 100% of meals but only meeting 50 - 75% EEN at this time. Rec'd Boost BID to provide additional kcals and protein. No sig wt hx per chart review; H&P attests to some weight loss. Visited pt. Pt stated he typically eats 2 meals/day, but developed anorexia from infected wound and po intake decreased to 1 meal/day. Described current appetite as "not starving, but not hungry either." Pt stated UBW is approx 260 lbs and sometimes dips to 240 bs during the summer time. He was surprised by his weight loss when weighed at hospital admit. Answered all pt questions. No N/V/D/C at this time, but did attest to fecal incontinence. RD to follow for any nutrition related changes.  Nutrition Discharge Planning: Rec'd d/c on diabetic diet    Nutrition Risk Screen    Nutrition Risk Screen: large or nonhealing wound, burn or pressure injury    Nutrition/Diet History    Food Preferences: Beef over rice  Spiritual, Cultural Beliefs, Mu-ism Practices, Values that Affect Care: no  Supplemental Drinks or Food Habits: Other (see comments) (Has had Ensure in the past)  Vitamin/Mineral/Herbal Supplements: Mg+Ca+Zinc, 2000 mg vit D, MVI, 2000 mg tumeric (Per pt statements, started taking these supplements bc of COVID)  Food Allergies: NKFA  Factors Affecting Nutritional Intake: decreased appetite    Anthropometrics    Temp: 98.8 °F (37.1 °C)  Height Method: Stated  Height: 5' 11" (180.3 cm)  Height (inches): 71 in  Weight Method: Bed Scale  Weight: 111 kg (244 lb 11.4 oz)  Weight (lb): 244.71 lb  Ideal Body Weight (IBW), Male: 172 lb  BMI (Calculated): 34.1  BMI Grade: 30 - 34.9- obesity - grade I  Usual Body Weight (UBW), k.2 kg  % Usual Body Weight: 94.1   "     Lab/Procedures/Meds    Pertinent Labs Reviewed: reviewed  Pertinent Labs Comments: 4/6: RBC 4.11 (L), H/H 10.1/32.1 (L), MCV 79 (L), MCH 24.6 (L), MCHC 31.1 (L), glucose 254 (H), calcium 8.5 (L), Mg 1.5 (L), POCT glucose 213 (H). 4/4: sed rate >120 (H), .5 (H), A1c 12.8 (H), est average glucose 321 (H).  Pertinent Medications Reviewed: reviewed  Pertinent Medications Comments: Amlodipine, insulin, mg sulfate IV, abx    Physical Findings/Assessment         Estimated/Assessed Needs    Weight Used For Calorie Calculations: 78.2 kg (172 lb 6.4 oz) (IBW)  Energy Calorie Requirements (kcal): 2346 - 2737  Energy Need Method: Kcal/kg (30 - 35 kcal/kg IBW for obese wound care pt)  Protein Requirements: 97.75 - 117.3 g (1.25 - 1.5 g/kg IBW for obese wound care pt)  Weight Used For Protein Calculations: 78.2 kg (172 lb 6.4 oz) (IBW)  Fluid Requirements (mL): 2346  Estimated Fluid Requirement Method: other (see comments) (30 mLs/kg IBW for obese wound care pt)  RDA Method (mL): 2346  CHO Requirement: 286 - 349 g (45 - 55% energy)      Nutrition Prescription Ordered    Current Diet Order: Diabetic; 2000 kcal (Isolation Tray - Reg [place])  Oral Nutrition Supplement: Boost Glucose Control Any Flavor BID    Evaluation of Received Nutrient/Fluid Intake    % Kcal Needs: 50 - 75% EEN  % Protein Needs: 75 - 100% EPN  I/O: + 1085.4 mLs (4/6/23)  Energy Calories Required: not meeting needs  Protein Required: meeting needs  Fluid Required: meeting needs  Comments: GI WDL  Tolerance: tolerating  % Intake of Estimated Energy Needs: 50 - 75 %  % Meal Intake: 75 - 100 %    Nutrition Risk    Level of Risk/Frequency of Follow-up: low - moderate       Monitor and Evaluation    Food and Nutrient Intake: energy intake, food and beverage intake, enteral nutrition intake  Food and Nutrient Adminstration: diet order  Knowledge/Beliefs/Attitudes: food and nutrition knowledge/skill, beliefs and attitudes  Physical Activity and Function:  nutrition-related ADLs and IADLs, factors affecting access to physical activity  Anthropometric Measurements: height/length, weight, weight change, body mass index  Biochemical Data, Medical Tests and Procedures: electrolyte and renal panel, gastrointestinal profile, glucose/endocrine profile, inflammatory profile, lipid profile  Nutrition-Focused Physical Findings: overall appearance       Nutrition Follow-Up    RD Follow-up?: Yes

## 2023-04-06 NOTE — PROGRESS NOTES
Postop day 1. From right 5th toe amputation.  Patient's surgical dressing is intact.  No need to remove today.  I will write some wound care orders starting tomorrow.  Patient will need outpatient home health and wound care.  I will write some wound care orders.  Patient may be discharged home from a surgical standpoint when okay with primary team.

## 2023-04-06 NOTE — PLAN OF CARE
Recommendation/Intervention:     1) Rec'd cont diabetic diet.     2) Rec'd cont Boost Glucose Control BID to provide an additional 380 kcals and 32 g PRO.     3) Rec'd honoring pt food preferences to encourage PO intake.     4) Rec'd frequent weight measurements to assess for any acute weight changes.     5) RD to follow and make rec's accordingly.    Goals: Pt will meet at least 75% ENN by RD follow-up  Nutrition Goal Status: new

## 2023-04-06 NOTE — SUBJECTIVE & OBJECTIVE
History reviewed. No pertinent past medical history.    History reviewed. No pertinent surgical history.    Review of patient's allergies indicates:  No Known Allergies    No current facility-administered medications on file prior to encounter.     No current outpatient medications on file prior to encounter.     Family History    None       Tobacco Use    Smoking status: Not on file    Smokeless tobacco: Not on file   Substance and Sexual Activity    Alcohol use: Not on file    Drug use: Not on file    Sexual activity: Not on file     Review of Systems   Constitutional:  Negative for chills and fever.   Respiratory:  Positive for cough. Negative for shortness of breath.    Cardiovascular:  Negative for chest pain and leg swelling.   Gastrointestinal:  Negative for nausea and vomiting.   Genitourinary:  Negative for difficulty urinating and dysuria.   Musculoskeletal:  Positive for gait problem and joint swelling.   Skin:  Positive for wound (bilateral foot wounds r>>>Lright foot and 5th metatarsal).   Objective:     Vital Signs (Most Recent):  Temp: 98.8 °F (37.1 °C) (04/06/23 1112)  Pulse: 90 (04/06/23 1112)  Resp: 18 (04/06/23 1112)  BP: (!) 166/83 (04/06/23 1112)  SpO2: 97 % (04/06/23 1112)   Vital Signs (24h Range):  Temp:  [96.8 °F (36 °C)-99.9 °F (37.7 °C)] 98.8 °F (37.1 °C)  Pulse:  [71-97] 90  Resp:  [17-18] 18  SpO2:  [93 %-99 %] 97 %  BP: (106-193)/() 166/83     Weight: 111 kg (244 lb 11.4 oz)  Body mass index is 34.13 kg/m².    Physical Exam  Constitutional:       Appearance: Normal appearance.   HENT:      Head: Normocephalic and atraumatic.   Cardiovascular:      Rate and Rhythm: Normal rate and regular rhythm.      Pulses: Normal pulses.      Heart sounds: Normal heart sounds.      Comments: Peripheral pulses intact   Pulmonary:      Breath sounds: Normal breath sounds.   Skin:     General: Skin is warm and dry.      Comments: Erythema around open wound with pus of right foot  Gangrene of right  5th metatarsal   Currently bandaged post op   Neurological:      Mental Status: He is alert.           Significant Labs: A1C:   Recent Labs   Lab 04/04/23  1224   HGBA1C 12.8*       ABGs:   No results for input(s): PH, PCO2, HCO3, POCSATURATED, BE, TOTALHB, COHB, METHB, O2HB, POCFIO2, PO2 in the last 48 hours.    Bilirubin:   Recent Labs   Lab 04/04/23  1224 04/05/23  0623 04/06/23  0403   BILITOT 0.4 0.3 0.3       Blood Culture:   No results for input(s): LABBLOO in the last 48 hours.    BMP:   Recent Labs   Lab 04/06/23  0403   *      K 4.1      CO2 24   BUN 10   CREATININE 1.2   CALCIUM 8.5*   MG 1.5*       CBC:   Recent Labs   Lab 04/05/23  0623 04/06/23  0403   WBC 15.96* 14.61*   HGB 9.6* 10.1*   HCT 31.1* 32.5*    480*       CMP:   Recent Labs   Lab 04/05/23  0623 04/06/23  0403   * 136   K 4.2 4.1    104   CO2 22* 24   * 254*   BUN 12 10   CREATININE 1.1 1.2   CALCIUM 8.4* 8.5*   PROT 6.5 7.0   ALBUMIN 1.9* 2.0*   BILITOT 0.3 0.3   ALKPHOS 68 76   AST 11 13   ALT 17 18   ANIONGAP 10 8       Cardiac Markers: No results for input(s): CKMB, MYOGLOBIN, BNP, TROPISTAT in the last 48 hours.  Coagulation: No results for input(s): PT, INR, APTT in the last 48 hours.  Lactic Acid:   No results for input(s): LACTATE in the last 48 hours.    Lipase: No results for input(s): LIPASE in the last 48 hours.  Lipid Panel: No results for input(s): CHOL, HDL, LDLCALC, TRIG, CHOLHDL in the last 48 hours.  Magnesium:   Recent Labs   Lab 04/06/23  0403   MG 1.5*     Respiratory Culture: No results for input(s): GSRESP, RESPIRATORYC in the last 48 hours.  Troponin: No results for input(s): TROPONINI, TROPONINIHS in the last 48 hours.  Urine Culture: No results for input(s): LABURIN in the last 48 hours.  Urine Studies: No results for input(s): COLORU, APPEARANCEUA, PHUR, SPECGRAV, PROTEINUA, GLUCUA, KETONESU, BILIRUBINUA, OCCULTUA, NITRITE, UROBILINOGEN, LEUKOCYTESUR, RBCUA, WBCUA,  BACTERIA, SQUAMEPITHEL, HYALINECASTS in the last 48 hours.    Invalid input(s): WRIGHTSUR    Significant Imaging:   MRI right foot  Mild degree of marrow edema with enhancement involving the 5th metatarsal head and the proximal phalanx of the 5th digit.  Differential diagnosis would include reactive hyperemia versus low-grade osteomyelitis.  The low signal intensity cortex is well maintained with respect to both of these osseous structures.     Diminished signal intensity without enhancement involving the distal phalanx of the 5th digit may represent some degree of sclerosis involving this phalanx.     Ulceration and edema in the soft tissues of the foot along the dorsal, lateral and plantar aspect of the foot at the level of the 5th digit and the plantar aspects of the 3rd and 4th digits.     Moderate arthritic changes of the 1st MTP joint.    XRAY foot  Soft tissue swelling and contour irregularity/soft tissue wound lateral to the 5th metatarsal with no underlying acute bony abnormality or bony destruction appreciated.

## 2023-04-06 NOTE — ASSESSMENT & PLAN NOTE
ESR>120 and CRP>182    MRI right foot with edema and enhancement involving the 5th metatarsal head and the proximal phalanx of the 5th digit.  Reactive hyperemia versus low-grade osteomyelitis.Ulceration and edema in the soft tissues of the foot along the dorsal, lateral and plantar aspect of the foot at the level of the 5th digit and the plantar aspects of the 3rd and 4th digits.    Venous u/s right leg: negative for DVT     IV vanc, IV zosyn   D/C IV fluids  General surgery consulted and debridement in the OR 4/5 with right toe amputation    Wound and blood cultures pending

## 2023-04-06 NOTE — PLAN OF CARE
04/06/23 1148   Post-Acute Status   Post-Acute Authorization Other   Other Status Awaiting f/u Appts         Patient needing outpatient wound care set up. Patient lives in [place] and would need follow up at outpatient wound care.   Referral and supporting documentation sent to [facility]. They will call patient with appointment date and time.

## 2023-04-06 NOTE — PROGRESS NOTES
Pharmacokinetic Assessment Follow Up: IV Vancomycin    Vancomycin serum concentration assessment(s):    The trough level was drawn correctly and can be used to guide therapy at this time. The measurement is within the desired definitive target range of 10 to 15 mcg/mL.    Vancomycin Regimen Plan:    Continue regimen to Vancomycin 1500 mg IV every 12 hours with next serum trough concentration measured at 18:00 prior to 4th dose on 4/7/23    Drug levels (last 3 results):  Recent Labs   Lab Result Units 04/06/23  0403   Vancomycin-Trough ug/mL 15.3       Pharmacy will continue to follow and monitor vancomycin.    Please contact pharmacy at extension [phone] for questions regarding this assessment.    Thank you for the consult,   Josy Estevez       Patient brief summary:  Willam Laurent is a 53 y.o. male initiated on antimicrobial therapy with IV Vancomycin for treatment of skin & soft tissue infection    The patient's current regimen is 1500 mg every 12 hours    Drug Allergies:   Review of patient's allergies indicates:  No Known Allergies    Actual Body Weight:   111 kg    Renal Function:   Estimated Creatinine Clearance: 89.2 mL/min (based on SCr of 1.2 mg/dL).,     Dialysis Method (if applicable):  NA    CBC (last 72 hours):  Recent Labs   Lab Result Units 04/04/23  1224 04/05/23  0623 04/06/23  0403   WBC K/uL 19.21* 15.96* 14.61*   Hemoglobin g/dL 10.8* 9.6* 10.1*   Hemoglobin A1C % 12.8*  --   --    Hematocrit % 33.6* 31.1* 32.5*   Platelets K/uL 454* 420 480*   Gran % % 84.9* 80.5* 80.7*   Lymph % % 5.2* 8.3* 8.8*   Mono % % 8.5 9.3 7.7   Eosinophil % % 0.1 0.4 1.1   Basophil % % 0.3 0.3 0.4   Differential Method  Automated Automated Automated       Metabolic Panel (last 72 hours):  Recent Labs   Lab Result Units 04/04/23  1224 04/05/23  0623 04/06/23  0403   Sodium mmol/L 131* 134* 136   Potassium mmol/L 3.9 4.2 4.1   Chloride mmol/L 95 102 104   CO2 mmol/L 27 22* 24   Glucose mg/dL 378* 323* 254*   BUN  mg/dL 16 12 10   Creatinine mg/dL 1.3 1.1 1.2   Albumin g/dL 2.3* 1.9* 2.0*   Total Bilirubin mg/dL 0.4 0.3 0.3   Alkaline Phosphatase U/L 96 68 76   AST U/L 17 11 13   ALT U/L 19 17 18   Magnesium mg/dL  --   --  1.5*       Vancomycin Administrations:  vancomycin given in the last 96 hours                     vancomycin 1,500 mg in dextrose 5 % (D5W) 250 mL IVPB (Vial-Mate) (mg) 1,500 mg New Bag 04/06/23 0705     1,500 mg New Bag 04/05/23 1710     1,500 mg New Bag  0307    vancomycin (VANCOCIN) 2,500 mg in dextrose 5 % (D5W) 500 mL IVPB (mg) 2,500 mg New Bag 04/04/23 1437                    Microbiologic Results:  Microbiology Results (last 7 days)       Procedure Component Value Units Date/Time    Aerobic culture [[id number]]     Order Status: No result Specimen: Wound

## 2023-04-06 NOTE — HOSPITAL COURSE
Patient went to OR yesterday for debridement of right foot necrotic soft tissue, tendons, fat and muscle and amputation of 5th metatarsal.  Continue wound care.  HD stable on IV abx.  Wound culture not collected.  Will continue IV abx and will likely discharge home with course of levofloxacin.  IV magnesium ordered for hypomagnesemia.  Adjusted bp medications due to continue hypertension. Started procardia and losartan.  WIll d/c IV fluids.      Increased insulin from 10 to 14.  Glucose 250's.      4/7: patient stable today. Surgery putting in final wound care orders. Glucose 200s; adjusting insulin and discussed outpatient diabetes management which is new diagnosis for him. BP elevated this AM but SBP 150s after meds so stable for d/c home. Switching IV Zosyn and Vanc to PO clinda for 7 days until can follow up with wound care and PCP--should offer similar coverage.  Gave education on diabetes and HTN management and nursing gave education as well prior to d/c.

## 2023-04-06 NOTE — PLAN OF CARE
Aerobic wound culture collected. Magnesium 2g administered.     Problem: Adult Inpatient Plan of Care  Goal: Plan of Care Review  Outcome: Ongoing, Progressing  Goal: Patient-Specific Goal (Individualized)  Outcome: Ongoing, Progressing  Goal: Absence of Hospital-Acquired Illness or Injury  Outcome: Ongoing, Progressing  Goal: Optimal Comfort and Wellbeing  Outcome: Ongoing, Progressing  Goal: Readiness for Transition of Care  Outcome: Ongoing, Progressing     Problem: Impaired Wound Healing  Goal: Optimal Wound Healing  Outcome: Ongoing, Progressing     Problem: Diabetes Comorbidity  Goal: Blood Glucose Level Within Targeted Range  Outcome: Ongoing, Progressing     Problem: Skin Injury Risk Increased  Goal: Skin Health and Integrity  Outcome: Ongoing, Progressing     Problem: Fall Injury Risk  Goal: Absence of Fall and Fall-Related Injury  Outcome: Ongoing, Progressing

## 2023-04-06 NOTE — ASSESSMENT & PLAN NOTE
Hg 10.8 at admission  Currently 9.6  Trend with daily CBC   Anemia work up ordered   Iron levels low.  Will order po iron.  Unable to get IV.  Suspect ferritin elevated in the setting of inflammation.

## 2023-04-06 NOTE — PLAN OF CARE
Problem: Adult Inpatient Plan of Care  Goal: Absence of Hospital-Acquired Illness or Injury  Outcome: Ongoing, Progressing     Problem: Impaired Wound Healing  Goal: Optimal Wound Healing  Outcome: Ongoing, Progressing     Problem: Diabetes Comorbidity  Goal: Blood Glucose Level Within Targeted Range  Outcome: Ongoing, Progressing     Problem: Skin Injury Risk Increased  Goal: Skin Health and Integrity  Outcome: Ongoing, Progressing

## 2023-04-06 NOTE — ASSESSMENT & PLAN NOTE
Not on home bp meds  Bp on higher side (component of IV fluids and pain)   D/c IV fluids.  Patient not in pain and bp still remains elevated  Will start procardia 30 and losartan

## 2023-04-06 NOTE — PLAN OF CARE
[facility] - Med Surg (3rd Fl)  Discharge Reassessment    Primary Care Provider: Alpa Santana DO    Expected Discharge Date:     Reassessment (most recent)       Discharge Reassessment - 04/06/23 1122          Discharge Reassessment    Assessment Type Discharge Planning Reassessment     Discharge Plan discussed with: Patient     Communicated SORADIA with patient/caregiver Date not available/Unable to determine     Discharge Plan A Home     Discharge Plan B Home     DME Needed Upon Discharge  glucometer     Why the patient remains in the hospital Requires continued medical care                     Discharge re-assessment completed with patient. Denies any post-acute care needs at this time. Patient reports not having an established PCP. He does prefer a provider in [place], [place]. He does give  permission to find and schedule PCP. Appointment obtained for Dr. Santana at [facility]. Patient will also need wound care follow-up. CM sending patient referral to [facility] at [facility]. She has been in contact with wound care rep in regards to patient referral. SW to remain available.

## 2023-04-06 NOTE — ASSESSMENT & PLAN NOTE
Albumin 2.3 at admission  Dietician consulted      38 y/o F, currently 20 weeks pregnant, with no significant pmhx and no significant pshx presents to the ED c/o intermittent SOB while at rest today. Usually resolves on its own but stayed constant today. Usually worsens with strenuous activity. Denies CP, abd pain, vaginal bleeding, leg swelling, and leg pain. FHx of blood clots, grandmother had unprovoked DVT in one leg at age 80. 38 y/o F, currently 20 weeks pregnant, with no significant pmhx and no significant pshx presents to the ED c/o intermittent SOB while at rest today. Usually resolves on its own but stayed constant today. Worsens with strenuous activity. Denies CP, abd pain, vaginal bleeding, leg swelling, and leg pain. FHx of blood clots, grandmother had unprovoked DVT in one leg at age 80.

## 2023-04-06 NOTE — PROGRESS NOTES
Pharmacokinetic Assessment Follow Up: IV Vancomycin    Vancomycin serum concentration assessment(s):    The trough level was drawn correctly and can be used to guide therapy at this time. The measurement is within the empiric target range of 10 to 20 mcg/mL.    Vancomycin Regimen Plan:    Continue regimen to Vancomycin 1500 mg IV every 12 hours with next serum trough concentration measured at 18:00 prior to 4th dose on 4/7/23    Drug levels (last 3 results):  Recent Labs   Lab Result Units 04/06/23  0403   Vancomycin-Trough ug/mL 15.3       Pharmacy will continue to follow and monitor vancomycin.    Please contact pharmacy at extension [phone] for questions regarding this assessment.    Thank you for the consult,   Josy Estevez       Patient brief summary:  Willam Laurent is a 53 y.o. male initiated on antimicrobial therapy with IV Vancomycin for treatment of skin & soft tissue infection    The patient's current regimen is 1500 mg every 12 hours    Drug Allergies:   Review of patient's allergies indicates:  No Known Allergies    Actual Body Weight:   111 kg    Renal Function:   Estimated Creatinine Clearance: 89.2 mL/min (based on SCr of 1.2 mg/dL).,     Dialysis Method (if applicable):  NA    CBC (last 72 hours):  Recent Labs   Lab Result Units 04/04/23  1224 04/05/23  0623 04/06/23  0403   WBC K/uL 19.21* 15.96* 14.61*   Hemoglobin g/dL 10.8* 9.6* 10.1*   Hemoglobin A1C % 12.8*  --   --    Hematocrit % 33.6* 31.1* 32.5*   Platelets K/uL 454* 420 480*   Gran % % 84.9* 80.5* 80.7*   Lymph % % 5.2* 8.3* 8.8*   Mono % % 8.5 9.3 7.7   Eosinophil % % 0.1 0.4 1.1   Basophil % % 0.3 0.3 0.4   Differential Method  Automated Automated Automated       Metabolic Panel (last 72 hours):  Recent Labs   Lab Result Units 04/04/23  1224 04/05/23  0623 04/06/23  0403   Sodium mmol/L 131* 134* 136   Potassium mmol/L 3.9 4.2 4.1   Chloride mmol/L 95 102 104   CO2 mmol/L 27 22* 24   Glucose mg/dL 378* 323* 254*   BUN mg/dL 16 12 10    Creatinine mg/dL 1.3 1.1 1.2   Albumin g/dL 2.3* 1.9* 2.0*   Total Bilirubin mg/dL 0.4 0.3 0.3   Alkaline Phosphatase U/L 96 68 76   AST U/L 17 11 13   ALT U/L 19 17 18   Magnesium mg/dL  --   --  1.5*       Vancomycin Administrations:  vancomycin given in the last 96 hours                     vancomycin 1,500 mg in dextrose 5 % (D5W) 250 mL IVPB (Vial-Mate) (mg) 1,500 mg New Bag 04/06/23 0705     1,500 mg New Bag 04/05/23 1710     1,500 mg New Bag  0307    vancomycin (VANCOCIN) 2,500 mg in dextrose 5 % (D5W) 500 mL IVPB (mg) 2,500 mg New Bag 04/04/23 1437                    Microbiologic Results:  Microbiology Results (last 7 days)       Procedure Component Value Units Date/Time    Aerobic culture [[id number]]     Order Status: No result Specimen: Wound

## 2023-04-06 NOTE — ASSESSMENT & PLAN NOTE
Patient's FSGs are uncontrolled due to hyperglycemia on current medication regimen.  Last A1c reviewed-   Lab Results   Component Value Date    HGBA1C 12.8 (H) 04/04/2023     Most recent fingerstick glucose reviewed-   Recent Labs   Lab 04/05/23  1609 04/05/23  1955 04/06/23  0712 04/06/23  1111   POCTGLUCOSE 188* 310* 213* 222*     Current correctional scale  Medium  Increased anti-hyperglycemic dose as follows-   Antihyperglycemics (From admission, onward)    Start     Stop Route Frequency Ordered    04/06/23 2100  insulin detemir U-100 (Levemir) pen 14 Units         -- SubQ Nightly 04/06/23 1518    04/05/23 0817  insulin aspart U-100 pen 0-10 Units         -- SubQ Before meals & nightly PRN 04/05/23 0817        Hold Oral hypoglycemics while patient is in the hospital.  New onset diabetes.  Diabetic educator consulted.

## 2023-04-07 VITALS
OXYGEN SATURATION: 97 % | HEART RATE: 99 BPM | BODY MASS INDEX: 34.26 KG/M2 | WEIGHT: 244.69 LBS | TEMPERATURE: 100 F | DIASTOLIC BLOOD PRESSURE: 80 MMHG | RESPIRATION RATE: 12 BRPM | HEIGHT: 71 IN | SYSTOLIC BLOOD PRESSURE: 158 MMHG

## 2023-04-07 LAB
ANION GAP SERPL CALC-SCNC: 12 MMOL/L (ref 8–16)
BASOPHILS # BLD AUTO: 0.08 K/UL (ref 0–0.2)
BASOPHILS NFR BLD: 0.8 % (ref 0–1.9)
BUN SERPL-MCNC: 9 MG/DL (ref 6–20)
CALCIUM SERPL-MCNC: 8.9 MG/DL (ref 8.7–10.5)
CHLORIDE SERPL-SCNC: 104 MMOL/L (ref 95–110)
CO2 SERPL-SCNC: 23 MMOL/L (ref 23–29)
CREAT SERPL-MCNC: 1 MG/DL (ref 0.5–1.4)
DIFFERENTIAL METHOD: ABNORMAL
EOSINOPHIL # BLD AUTO: 0.3 K/UL (ref 0–0.5)
EOSINOPHIL NFR BLD: 2.9 % (ref 0–8)
ERYTHROCYTE [DISTWIDTH] IN BLOOD BY AUTOMATED COUNT: 13.3 % (ref 11.5–14.5)
EST. GFR  (NO RACE VARIABLE): >60 ML/MIN/1.73 M^2
GLUCOSE SERPL-MCNC: 217 MG/DL (ref 70–110)
HCT VFR BLD AUTO: 34 % (ref 40–54)
HGB BLD-MCNC: 10.3 G/DL (ref 14–18)
IMM GRANULOCYTES # BLD AUTO: 0.19 K/UL (ref 0–0.04)
IMM GRANULOCYTES NFR BLD AUTO: 1.9 % (ref 0–0.5)
LYMPHOCYTES # BLD AUTO: 1.5 K/UL (ref 1–4.8)
LYMPHOCYTES NFR BLD: 15 % (ref 18–48)
MAGNESIUM SERPL-MCNC: 1.6 MG/DL (ref 1.6–2.6)
MCH RBC QN AUTO: 24.1 PG (ref 27–31)
MCHC RBC AUTO-ENTMCNC: 30.3 G/DL (ref 32–36)
MCV RBC AUTO: 80 FL (ref 82–98)
MONOCYTES # BLD AUTO: 1.1 K/UL (ref 0.3–1)
MONOCYTES NFR BLD: 10.9 % (ref 4–15)
NEUTROPHILS # BLD AUTO: 6.8 K/UL (ref 1.8–7.7)
NEUTROPHILS NFR BLD: 68.5 % (ref 38–73)
NRBC BLD-RTO: 0 /100 WBC
PLATELET # BLD AUTO: 506 K/UL (ref 150–450)
PMV BLD AUTO: 9.1 FL (ref 9.2–12.9)
POCT GLUCOSE: 210 MG/DL (ref 70–110)
POCT GLUCOSE: 228 MG/DL (ref 70–110)
POTASSIUM SERPL-SCNC: 3.9 MMOL/L (ref 3.5–5.1)
RBC # BLD AUTO: 4.27 M/UL (ref 4.6–6.2)
SODIUM SERPL-SCNC: 139 MMOL/L (ref 136–145)
WBC # BLD AUTO: 9.88 K/UL (ref 3.9–12.7)

## 2023-04-07 PROCEDURE — 36415 COLL VENOUS BLD VENIPUNCTURE: CPT | Performed by: PHYSICIAN ASSISTANT

## 2023-04-07 PROCEDURE — 63600175 PHARM REV CODE 636 W HCPCS: Performed by: INTERNAL MEDICINE

## 2023-04-07 PROCEDURE — 99239 PR HOSPITAL DISCHARGE DAY,>30 MIN: ICD-10-PCS | Mod: ,,, | Performed by: INTERNAL MEDICINE

## 2023-04-07 PROCEDURE — 25000003 PHARM REV CODE 250: Performed by: INTERNAL MEDICINE

## 2023-04-07 PROCEDURE — 85025 COMPLETE CBC W/AUTO DIFF WBC: CPT | Performed by: PHYSICIAN ASSISTANT

## 2023-04-07 PROCEDURE — 25000003 PHARM REV CODE 250: Performed by: PHYSICIAN ASSISTANT

## 2023-04-07 PROCEDURE — 99239 HOSP IP/OBS DSCHRG MGMT >30: CPT | Mod: ,,, | Performed by: INTERNAL MEDICINE

## 2023-04-07 PROCEDURE — 80048 BASIC METABOLIC PNL TOTAL CA: CPT | Performed by: PHYSICIAN ASSISTANT

## 2023-04-07 PROCEDURE — 83735 ASSAY OF MAGNESIUM: CPT | Performed by: PHYSICIAN ASSISTANT

## 2023-04-07 RX ORDER — LANCETS
EACH MISCELLANEOUS
Qty: 100 EACH | Refills: 1 | Status: SHIPPED | OUTPATIENT
Start: 2023-04-07 | End: 2023-04-27

## 2023-04-07 RX ORDER — LOSARTAN POTASSIUM 25 MG/1
25 TABLET ORAL DAILY
Qty: 30 TABLET | Refills: 0 | Status: SHIPPED | OUTPATIENT
Start: 2023-04-07 | End: 2023-04-14 | Stop reason: SDUPTHER

## 2023-04-07 RX ORDER — LOSARTAN POTASSIUM 25 MG/1
25 TABLET ORAL ONCE
Status: DISCONTINUED | OUTPATIENT
Start: 2023-04-07 | End: 2023-04-07 | Stop reason: HOSPADM

## 2023-04-07 RX ORDER — CLINDAMYCIN HYDROCHLORIDE 300 MG/1
600 CAPSULE ORAL EVERY 8 HOURS
Qty: 42 CAPSULE | Refills: 0 | Status: SHIPPED | OUTPATIENT
Start: 2023-04-07 | End: 2023-04-14

## 2023-04-07 RX ORDER — SYRINGE AND NEEDLE,INSULIN,1ML 25GX1"
SYRINGE, EMPTY DISPOSABLE MISCELLANEOUS
Qty: 100 EACH | Refills: 0 | Status: SHIPPED | OUTPATIENT
Start: 2023-04-07

## 2023-04-07 RX ORDER — NIFEDIPINE 30 MG/1
30 TABLET, EXTENDED RELEASE ORAL DAILY
Qty: 30 TABLET | Refills: 0 | Status: SHIPPED | OUTPATIENT
Start: 2023-04-08 | End: 2023-04-27 | Stop reason: SDUPTHER

## 2023-04-07 RX ORDER — FERROUS SULFATE 325(65) MG
325 TABLET, DELAYED RELEASE (ENTERIC COATED) ORAL DAILY
Qty: 30 TABLET | Refills: 0 | Status: SHIPPED | OUTPATIENT
Start: 2023-04-07 | End: 2023-04-27 | Stop reason: SDUPTHER

## 2023-04-07 RX ORDER — PEN NEEDLE, DIABETIC 30 GX3/16"
NEEDLE, DISPOSABLE MISCELLANEOUS
Qty: 100 EACH | Refills: 1 | Status: SHIPPED | OUTPATIENT
Start: 2023-04-07

## 2023-04-07 RX ORDER — INSULIN PUMP SYRINGE, 3 ML
EACH MISCELLANEOUS
Qty: 1 EACH | Refills: 0 | Status: SHIPPED | OUTPATIENT
Start: 2023-04-07 | End: 2023-04-27

## 2023-04-07 RX ORDER — CLINDAMYCIN HYDROCHLORIDE 150 MG/1
600 CAPSULE ORAL EVERY 8 HOURS
Status: DISCONTINUED | OUTPATIENT
Start: 2023-04-07 | End: 2023-04-07 | Stop reason: HOSPADM

## 2023-04-07 RX ORDER — METFORMIN HYDROCHLORIDE 1000 MG/1
1000 TABLET ORAL
Qty: 30 TABLET | Refills: 0 | Status: SHIPPED | OUTPATIENT
Start: 2023-04-07 | End: 2023-04-14 | Stop reason: SDUPTHER

## 2023-04-07 RX ORDER — LOSARTAN POTASSIUM 50 MG/1
50 TABLET ORAL DAILY
Status: DISCONTINUED | OUTPATIENT
Start: 2023-04-08 | End: 2023-04-07 | Stop reason: HOSPADM

## 2023-04-07 RX ADMIN — NIFEDIPINE 30 MG: 30 TABLET, FILM COATED, EXTENDED RELEASE ORAL at 08:04

## 2023-04-07 RX ADMIN — FERROUS SULFATE TAB 325 MG (65 MG ELEMENTAL FE) 1 EACH: 325 (65 FE) TAB at 08:04

## 2023-04-07 RX ADMIN — VANCOMYCIN HYDROCHLORIDE 1500 MG: 1.5 INJECTION, POWDER, LYOPHILIZED, FOR SOLUTION INTRAVENOUS at 06:04

## 2023-04-07 RX ADMIN — INSULIN ASPART 2 UNITS: 100 INJECTION, SOLUTION INTRAVENOUS; SUBCUTANEOUS at 08:04

## 2023-04-07 RX ADMIN — PIPERACILLIN AND TAZOBACTAM 4.5 G: 4; .5 INJECTION, POWDER, LYOPHILIZED, FOR SOLUTION INTRAVENOUS; PARENTERAL at 04:04

## 2023-04-07 RX ADMIN — LOSARTAN POTASSIUM 25 MG: 25 TABLET, FILM COATED ORAL at 08:04

## 2023-04-07 NOTE — PLAN OF CARE
Wound care performed. Diabetic education provided. Pt demonstrated use of insulin pin. Wound care education provided.  Discharge education provided.   Problem: Fall Injury Risk  Goal: Absence of Fall and Fall-Related Injury  Outcome: Met     Problem: Skin Injury Risk Increased  Goal: Skin Health and Integrity  Outcome: Met     Problem: Diabetes Comorbidity  Goal: Blood Glucose Level Within Targeted Range  Outcome: Met

## 2023-04-07 NOTE — ASSESSMENT & PLAN NOTE
CXR with cardiomegaly but no consolidation present.      Better day of discharge. No signs of infection however

## 2023-04-07 NOTE — PROGRESS NOTES
Therapy with vancomycin is complete and/or consult discontinued by provider.  Pharmacy will sign off, please re-consult as needed.    Christiano MoodyD.

## 2023-04-07 NOTE — ASSESSMENT & PLAN NOTE
WBC 19.21 at admission  Currently 14.61    4/7: resolved. Due to infection. Home with PO clindamycin

## 2023-04-07 NOTE — ASSESSMENT & PLAN NOTE
Patient's FSGs are uncontrolled due to hyperglycemia on current medication regimen.  Last A1c reviewed-   Lab Results   Component Value Date    HGBA1C 12.8 (H) 04/04/2023     Most recent fingerstick glucose reviewed-   Recent Labs   Lab 04/06/23  1640 04/06/23  1946 04/07/23  0727 04/07/23  1112   POCTGLUCOSE 225* 310* 210* 228*     Current correctional scale  Medium  Increased anti-hyperglycemic dose as follows-   Antihyperglycemics (From admission, onward)    Start     Stop Route Frequency Ordered    04/06/23 2100  insulin detemir U-100 (Levemir) pen 14 Units         -- SubQ Nightly 04/06/23 1518    04/05/23 0817  insulin aspart U-100 pen 0-10 Units         -- SubQ Before meals & nightly PRN 04/05/23 0817        Hold Oral hypoglycemics while patient is in the hospital.  New onset diabetes.  Diabetic educator consulted.      4/7: home with long acting insulin and metformin. Glucometer. Insulin teaching and glucometer teaching done prior to discharge. Long conversation about importance of glucose control for wound healing!!!

## 2023-04-07 NOTE — DISCHARGE INSTRUCTIONS
Remove dressing and packing.  Irrigate wound with normal saline.  Pack with silver alginate dressing and cover with gauze and Kerlix.

## 2023-04-07 NOTE — SUBJECTIVE & OBJECTIVE
History reviewed. No pertinent past medical history.    History reviewed. No pertinent surgical history.    Review of patient's allergies indicates:  No Known Allergies    No current facility-administered medications on file prior to encounter.     No current outpatient medications on file prior to encounter.     Family History    None       Tobacco Use    Smoking status: Not on file    Smokeless tobacco: Not on file   Substance and Sexual Activity    Alcohol use: Not on file    Drug use: Not on file    Sexual activity: Not on file     Review of Systems   Constitutional:  Negative for chills and fever.   Respiratory:  Negative for cough and shortness of breath.    Cardiovascular:  Negative for chest pain and leg swelling.   Gastrointestinal:  Negative for nausea and vomiting.   Genitourinary:  Negative for difficulty urinating and dysuria.   Musculoskeletal:  Negative for joint swelling.   Skin:  Positive for wound (post op wound 5th metatarsal).   Objective:     Vital Signs (Most Recent):  Temp: 99.6 °F (37.6 °C) (04/07/23 1115)  Pulse: 99 (04/07/23 1115)  Resp: 12 (04/07/23 1115)  BP: (!) 158/80 (04/07/23 0900)  SpO2: 97 % (04/07/23 1115)   Vital Signs (24h Range):  Temp:  [98 °F (36.7 °C)-99.6 °F (37.6 °C)] 99.6 °F (37.6 °C)  Pulse:  [89-99] 99  Resp:  [12-18] 12  SpO2:  [93 %-97 %] 97 %  BP: (158-194)/(80-95) 158/80     Weight: 111 kg (244 lb 11.4 oz)  Body mass index is 34.13 kg/m².    Physical Exam  Vitals and nursing note reviewed.   Constitutional:       Appearance: Normal appearance.   HENT:      Head: Normocephalic and atraumatic.      Nose: Nose normal.      Mouth/Throat:      Mouth: Mucous membranes are moist.      Pharynx: Oropharynx is clear.   Eyes:      General:         Right eye: No discharge.         Left eye: No discharge.      Extraocular Movements: Extraocular movements intact.      Conjunctiva/sclera: Conjunctivae normal.   Cardiovascular:      Rate and Rhythm: Normal rate and regular rhythm.       Pulses: Normal pulses.      Heart sounds: Normal heart sounds.      Comments: Peripheral pulses intact   Pulmonary:      Effort: No respiratory distress.      Breath sounds: Normal breath sounds. No wheezing.   Skin:     General: Skin is warm and dry.      Comments: Currently bandaged post op-no surrounding swelling or erythema and dressing c/d/I. Will leave in place as just changed for surgery to re-eval   Neurological:      Mental Status: He is alert.           Significant Labs: A1C:   Recent Labs   Lab 04/04/23  1224   HGBA1C 12.8*       ABGs:   No results for input(s): PH, PCO2, HCO3, POCSATURATED, BE, TOTALHB, COHB, METHB, O2HB, POCFIO2, PO2 in the last 48 hours.    Bilirubin:   Recent Labs   Lab 04/04/23  1224 04/05/23  0623 04/06/23  0403   BILITOT 0.4 0.3 0.3       Blood Culture:   No results for input(s): LABBLOO in the last 48 hours.    BMP:   Recent Labs   Lab 04/07/23  0556   *      K 3.9      CO2 23   BUN 9   CREATININE 1.0   CALCIUM 8.9   MG 1.6       CBC:   Recent Labs   Lab 04/06/23  0403 04/07/23  0556   WBC 14.61* 9.88   HGB 10.1* 10.3*   HCT 32.5* 34.0*   * 506*       CMP:   Recent Labs   Lab 04/06/23  0403 04/07/23  0556    139   K 4.1 3.9    104   CO2 24 23   * 217*   BUN 10 9   CREATININE 1.2 1.0   CALCIUM 8.5* 8.9   PROT 7.0  --    ALBUMIN 2.0*  --    BILITOT 0.3  --    ALKPHOS 76  --    AST 13  --    ALT 18  --    ANIONGAP 8 12       Cardiac Markers: No results for input(s): CKMB, MYOGLOBIN, BNP, TROPISTAT in the last 48 hours.  Coagulation: No results for input(s): PT, INR, APTT in the last 48 hours.  Lactic Acid:   No results for input(s): LACTATE in the last 48 hours.    Lipase: No results for input(s): LIPASE in the last 48 hours.  Lipid Panel: No results for input(s): CHOL, HDL, LDLCALC, TRIG, CHOLHDL in the last 48 hours.  Magnesium:   Recent Labs   Lab 04/06/23  0403 04/07/23  0556   MG 1.5* 1.6       Respiratory Culture: No results for  input(s): GSRESP, RESPIRATORYC in the last 48 hours.  Troponin: No results for input(s): TROPONINI, TROPONINIHS in the last 48 hours.  Urine Culture: No results for input(s): LABURIN in the last 48 hours.  Urine Studies: No results for input(s): COLORU, APPEARANCEUA, PHUR, SPECGRAV, PROTEINUA, GLUCUA, KETONESU, BILIRUBINUA, OCCULTUA, NITRITE, UROBILINOGEN, LEUKOCYTESUR, RBCUA, WBCUA, BACTERIA, SQUAMEPITHEL, HYALINECASTS in the last 48 hours.    Invalid input(s): WRIGHTSUR    Significant Imaging:   MRI right foot  Mild degree of marrow edema with enhancement involving the 5th metatarsal head and the proximal phalanx of the 5th digit.  Differential diagnosis would include reactive hyperemia versus low-grade osteomyelitis.  The low signal intensity cortex is well maintained with respect to both of these osseous structures.     Diminished signal intensity without enhancement involving the distal phalanx of the 5th digit may represent some degree of sclerosis involving this phalanx.     Ulceration and edema in the soft tissues of the foot along the dorsal, lateral and plantar aspect of the foot at the level of the 5th digit and the plantar aspects of the 3rd and 4th digits.     Moderate arthritic changes of the 1st MTP joint.    XRAY foot  Soft tissue swelling and contour irregularity/soft tissue wound lateral to the 5th metatarsal with no underlying acute bony abnormality or bony destruction appreciated.

## 2023-04-07 NOTE — DISCHARGE SUMMARY
[facility] ([facility])  [facility] Medicine  Discharge Summary      Patient Name: Willam Laurent  MRN: [id number]  [facility]: [id number]  Patient Class: IP- Inpatient  Admission Date: 4/4/2023  Hospital Length of Stay: 3 days  Discharge Date and Time:  04/07/2023 11:41 AM  Attending Physician: Theo Dela Cruz MD   Discharging Provider: Pastora Moreno MD  Primary Care Provider: Alpa Santana DO    Primary Care Team: Networked reference to record PCT     HPI:   Patient is a 53 year old male with medical history of HTN and prediabetes who presented to the ED with right foot wound.  Two and half weeks he stepped on a knob of a weedeater and notice a foot wound.  He soaked it in epson salt and it started to peel.  Wound progressed and noticed his toe turning black.  He has been in pain and having difficulty ambulating.  He has had chills but has not noticed any fever.      Admitted for right foot wound with general surgery consult for debridement.  Concern for OM on MRI foot.    Stared on IV fluids, IV vanc and IV zosyn.        Procedure(s) (LRB):  IRRIGATION AND DEBRIDEMENT (FOOT) (Right)  AMPUTATION, 5th toe (Right)      Hospital Course:   Patient went to OR yesterday for debridement of right foot necrotic soft tissue, tendons, fat and muscle and amputation of 5th metatarsal.  Continue wound care.  HD stable on IV abx.  Wound culture not collected.  Will continue IV abx and will likely discharge home with course of levofloxacin.  IV magnesium ordered for hypomagnesemia.  Adjusted bp medications due to continue hypertension. Started procardia and losartan.  WIll d/c IV fluids.      Increased insulin from 10 to 14.  Glucose 250's.      4/7: patient stable today. Surgery putting in final wound care orders. Glucose 200s; adjusting insulin and discussed outpatient diabetes management which is new diagnosis for him. BP elevated this AM but SBP 150s after meds so stable for d/c home. Switching IV Zosyn and  Vanc to PO clinda for 7 days until can follow up with wound care and PCP--should offer similar coverage.  Gave education on diabetes and HTN management and nursing gave education as well prior to d/c.        Goals of Care Treatment Preferences:         Consults:   Consults (From admission, onward)        Status Ordering Provider     Inpatient consult to Social Work  Once        Provider:  (Not yet assigned)    Completed DELIA KWAN JR.     Inpatient consult to Diabetes Educator  Once        Provider:  (Not yet assigned)    Acknowledged ALYSA JOHNSON     Inpatient consult to Registered Dietitian/Nutritionist  Once        Provider:  (Not yet assigned)    Completed ALYSA JOHNSON     Inpatient consult to General Surgery  Once        Provider:  Nehemias Buckley MD    Completed PENG BUNN          Pulmonary  Cough  CXR with cardiomegaly but no consolidation present.      Better day of discharge. No signs of infection however      Cardiac/Vascular  Hypertension  Not on home bp meds  Bp on higher side (component of IV fluids and pain)   D/c IV fluids.  Patient not in pain and bp still remains elevated  Will start procardia 30 and losartan     4/7: BP much better. Cont nifedipine and procardia. Not at long term goal but safe for d/c today on these meds and follow up PCP next week      Oncology  Anemia  Stable this admit  Iron levels low.   po iron ordered-can d/c home on this      Hypoalbuminemia  Albumin 2.3 at admission  Dietician consulted       Leukocytosis  WBC 19.21 at admission  Currently 14.61    4/7: resolved. Due to infection. Home with PO clindamycin       Endocrine  Diabetes mellitus, type 2  Patient's FSGs are uncontrolled due to hyperglycemia on current medication regimen.  Last A1c reviewed-   Lab Results   Component Value Date    HGBA1C 12.8 (H) 04/04/2023     Most recent fingerstick glucose reviewed-   Recent Labs   Lab 04/06/23  1640 04/06/23  1946 04/07/23  0727 04/07/23  1112    POCTGLUCOSE 225* 310* 210* 228*     Current correctional scale  Medium  Increased anti-hyperglycemic dose as follows-   Antihyperglycemics (From admission, onward)    Start     Stop Route Frequency Ordered    04/06/23 2100  insulin detemir U-100 (Levemir) pen 14 Units         -- SubQ Nightly 04/06/23 1518    04/05/23 0817  insulin aspart U-100 pen 0-10 Units         -- SubQ Before meals & nightly PRN 04/05/23 0817        Hold Oral hypoglycemics while patient is in the hospital.  New onset diabetes.  Diabetic educator consulted.      4/7: home with long acting insulin and metformin. Glucometer. Insulin teaching and glucometer teaching done prior to discharge. Long conversation about importance of glucose control for wound healing!!!      Orthopedic  * Wound of right foot  ESR>120 and CRP>182    MRI right foot with edema and enhancement involving the 5th metatarsal head and the proximal phalanx of the 5th digit.  Reactive hyperemia versus low-grade osteomyelitis.Ulceration and edema in the soft tissues of the foot along the dorsal, lateral and plantar aspect of the foot at the level of the 5th digit and the plantar aspects of the 3rd and 4th digits.    Venous u/s right leg: negative for DVT     IV vanc, IV zosyn   D/C IV fluids  General surgery consulted and debridement in the OR 4/5 with right toe amputation    Wound and blood cultures pending     4/7: surgery did right toe amputation 4/5  Surgery placed wound care recommendations for home and will follow up in outpatient wound care clinic (will call next week for appointment)  PO clindamycin for 7 days outpatient until follow up PCP and wound care           Final Active Diagnoses:    Diagnosis Date Noted POA    PRINCIPAL PROBLEM:  Wound of right foot [S91.301A] 04/05/2023 Yes    Diabetes mellitus, type 2 [E11.9] 04/05/2023 Yes    Leukocytosis [D72.829] 04/05/2023 Yes    Hypoalbuminemia [E88.09] 04/05/2023 Yes    Anemia [D64.9] 04/05/2023 Yes    Hypertension  "[I10] 04/05/2023 Yes    Cough [R05.9] 04/05/2023 Yes      Problems Resolved During this Admission:       Discharged Condition: stable    Disposition: Home or Self Care    Follow Up:   Follow-up Information     Alpa Santana DO. Go on 4/14/2023.    Specialty: Family Medicine  Why: Hospital Follow-up at 9:40am  Contact information:  [address]  [address] [place] [address] [phone]                       Patient Instructions:      Ambulatory referral/consult to Wound Clinic   Standing Status: Future   Referral Priority: Routine Referral Type: Consultation   Referral Reason: Specialty Services Required   Requested Specialty: Wound Care   Number of Visits Requested: 1       Significant Diagnostic Studies: Labs:   CMP   Recent Labs   Lab 04/06/23  0403 04/07/23  0556    139   K 4.1 3.9    104   CO2 24 23   * 217*   BUN 10 9   CREATININE 1.2 1.0   CALCIUM 8.5* 8.9   PROT 7.0  --    ALBUMIN 2.0*  --    BILITOT 0.3  --    ALKPHOS 76  --    AST 13  --    ALT 18  --    ANIONGAP 8 12   , CBC   Recent Labs   Lab 04/06/23  0403 04/07/23  0556   WBC 14.61* 9.88   HGB 10.1* 10.3*   HCT 32.5* 34.0*   * 506*    and All labs within the past 24 hours have been reviewed    Pending Diagnostic Studies:     Procedure Component Value Units Date/Time    Specimen to Pathology, Surgery General Surgery [[id number]] Collected: 04/05/23 1532    Order Status: Sent Lab Status: In process Updated: 04/06/23 1233    Specimen: Tissue     Vitamin B1 [[id number]] Collected: 04/06/23 0403    Order Status: Sent Lab Status: In process Updated: 04/06/23 0938    Specimen: Blood          Medications:  Reconciled Home Medications:      Medication List      START taking these medications    BD INSULIN SYRINGE 1 mL 25 gauge x 5/8" Syrg  Generic drug: insulin syringe-needle U-100  Inject insulin nightly     blood sugar diagnostic Strp  Commonly known as: ACCU-CHEK SMARTVIEW TEST STRIP  Check blood sugar TID   " "  blood-glucose meter kit  Check blood sugar BID     clindamycin 300 MG capsule  Commonly known as: CLEOCIN  Take 2 capsules (600 mg total) by mouth every 8 (eight) hours. for 7 days     ferrous sulfate 325 (65 FE) MG EC tablet  Take 1 tablet (325 mg total) by mouth once daily.     insulin detemir U-100 (Levemir) 100 unit/mL (3 mL) Inpn pen  Inject 20 Units into the skin every evening.     lancets Misc  Check blood sugar BID     losartan 25 MG tablet  Commonly known as: COZAAR  Take 1 tablet (25 mg total) by mouth once daily.     metFORMIN 1000 MG tablet  Commonly known as: GLUCOPHAGE  Take 1 tablet (1,000 mg total) by mouth daily with breakfast.     NIFEdipine 30 MG (OSM) 24 hr tablet  Commonly known as: PROCARDIA-XL  Take 1 tablet (30 mg total) by mouth once daily.  Start taking on: April 8, 2023     pen needle, diabetic 31 gauge x 5/16" Ndle  Commonly known as: BD ULTRA-FINE SHORT PEN NEEDLE  Inject insulin nightly            Indwelling Lines/Drains at time of discharge:   Lines/Drains/Airways     None                 Time spent on the discharge of patient: 35 minutes         Pastora Moreno MD  Department of Hospital Medicine  [facility] (3rd Fl)  "

## 2023-04-07 NOTE — ASSESSMENT & PLAN NOTE
Not on home bp meds  Bp on higher side (component of IV fluids and pain)   D/c IV fluids.  Patient not in pain and bp still remains elevated  Will start procardia 30 and losartan     4/7: BP much better. Cont nifedipine and procardia. Not at long term goal but safe for d/c today on these meds and follow up PCP next week

## 2023-04-07 NOTE — ASSESSMENT & PLAN NOTE
ESR>120 and CRP>182    MRI right foot with edema and enhancement involving the 5th metatarsal head and the proximal phalanx of the 5th digit.  Reactive hyperemia versus low-grade osteomyelitis.Ulceration and edema in the soft tissues of the foot along the dorsal, lateral and plantar aspect of the foot at the level of the 5th digit and the plantar aspects of the 3rd and 4th digits.    Venous u/s right leg: negative for DVT     IV vanc, IV zosyn   D/C IV fluids  General surgery consulted and debridement in the OR 4/5 with right toe amputation    Wound and blood cultures pending     4/7: surgery did right toe amputation 4/5  Surgery placed wound care recommendations for home and will follow up in outpatient wound care clinic (will call next week for appointment)  PO clindamycin for 7 days outpatient until follow up PCP and wound care

## 2023-04-07 NOTE — PROGRESS NOTES
[facility] Surg ([facility])  [facility] Medicine  Progress Note    Patient Name: Willam Laurent  MRN: [id number]  Patient Class: IP- Inpatient   Admission Date: 4/4/2023  Length of Stay: 3 days  Attending Physician: Theo Dela Cruz MD  Primary Care Provider: Alpa Santana DO        Subjective:     Principal Problem:Wound of right foot        HPI:  Patient is a 53 year old male with medical history of HTN and prediabetes who presented to the ED with right foot wound.  Two and half weeks he stepped on a knob of a weedeater and notice a foot wound.  He soaked it in epson salt and it started to peel.  Wound progressed and noticed his toe turning black.  He has been in pain and having difficulty ambulating.  He has had chills but has not noticed any fever.      Admitted for right foot wound with general surgery consult for debridement.  Concern for OM on MRI foot.    Stared on IV fluids, IV vanc and IV zosyn.        Overview/Hospital Course:  Patient went to OR yesterday for debridement of right foot necrotic soft tissue, tendons, fat and muscle and amputation of 5th metatarsal.  Continue wound care.  HD stable on IV abx.  Wound culture not collected.  Will continue IV abx and will likely discharge home with course of levofloxacin.  IV magnesium ordered for hypomagnesemia.  Adjusted bp medications due to continue hypertension. Started procardia and losartan.  WIll d/c IV fluids.      Increased insulin from 10 to 14.  Glucose 250's.      4/7: patient stable today. Surgery putting in final wound care orders. Glucose 200s; adjusting insulin and discussed outpatient diabetes management which is new diagnosis for him. BP elevated this AM but SBP 150s after meds so stable for d/c home. Switching IV Zosyn and Vanc to PO clinda for 7 days until can follow up with wound care and PCP--should offer similar coverage.  Gave education on diabetes and HTN management and nursing gave education as well prior to d/c.        History reviewed. No pertinent past medical history.    History reviewed. No pertinent surgical history.    Review of patient's allergies indicates:  No Known Allergies    No current facility-administered medications on file prior to encounter.     No current outpatient medications on file prior to encounter.     Family History    None       Tobacco Use    Smoking status: Not on file    Smokeless tobacco: Not on file   Substance and Sexual Activity    Alcohol use: Not on file    Drug use: Not on file    Sexual activity: Not on file     Review of Systems   Constitutional:  Negative for chills and fever.   Respiratory:  Negative for cough and shortness of breath.    Cardiovascular:  Negative for chest pain and leg swelling.   Gastrointestinal:  Negative for nausea and vomiting.   Genitourinary:  Negative for difficulty urinating and dysuria.   Musculoskeletal:  Negative for joint swelling.   Skin:  Positive for wound (post op wound 5th metatarsal).   Objective:     Vital Signs (Most Recent):  Temp: 99.6 °F (37.6 °C) (04/07/23 1115)  Pulse: 99 (04/07/23 1115)  Resp: 12 (04/07/23 1115)  BP: (!) 158/80 (04/07/23 0900)  SpO2: 97 % (04/07/23 1115)   Vital Signs (24h Range):  Temp:  [98 °F (36.7 °C)-99.6 °F (37.6 °C)] 99.6 °F (37.6 °C)  Pulse:  [89-99] 99  Resp:  [12-18] 12  SpO2:  [93 %-97 %] 97 %  BP: (158-194)/(80-95) 158/80     Weight: 111 kg (244 lb 11.4 oz)  Body mass index is 34.13 kg/m².    Physical Exam  Vitals and nursing note reviewed.   Constitutional:       Appearance: Normal appearance.   HENT:      Head: Normocephalic and atraumatic.      Nose: Nose normal.      Mouth/Throat:      Mouth: Mucous membranes are moist.      Pharynx: Oropharynx is clear.   Eyes:      General:         Right eye: No discharge.         Left eye: No discharge.      Extraocular Movements: Extraocular movements intact.      Conjunctiva/sclera: Conjunctivae normal.   Cardiovascular:      Rate and Rhythm: Normal rate and  regular rhythm.      Pulses: Normal pulses.      Heart sounds: Normal heart sounds.      Comments: Peripheral pulses intact   Pulmonary:      Effort: No respiratory distress.      Breath sounds: Normal breath sounds. No wheezing.   Skin:     General: Skin is warm and dry.      Comments: Currently bandaged post op-no surrounding swelling or erythema and dressing c/d/I. Will leave in place as just changed for surgery to re-eval   Neurological:      Mental Status: He is alert.           Significant Labs: A1C:   Recent Labs   Lab 04/04/23  1224   HGBA1C 12.8*       ABGs:   No results for input(s): PH, PCO2, HCO3, POCSATURATED, BE, TOTALHB, COHB, METHB, O2HB, POCFIO2, PO2 in the last 48 hours.    Bilirubin:   Recent Labs   Lab 04/04/23  1224 04/05/23  0623 04/06/23  0403   BILITOT 0.4 0.3 0.3       Blood Culture:   No results for input(s): LABBLOO in the last 48 hours.    BMP:   Recent Labs   Lab 04/07/23  0556   *      K 3.9      CO2 23   BUN 9   CREATININE 1.0   CALCIUM 8.9   MG 1.6       CBC:   Recent Labs   Lab 04/06/23  0403 04/07/23  0556   WBC 14.61* 9.88   HGB 10.1* 10.3*   HCT 32.5* 34.0*   * 506*       CMP:   Recent Labs   Lab 04/06/23  0403 04/07/23  0556    139   K 4.1 3.9    104   CO2 24 23   * 217*   BUN 10 9   CREATININE 1.2 1.0   CALCIUM 8.5* 8.9   PROT 7.0  --    ALBUMIN 2.0*  --    BILITOT 0.3  --    ALKPHOS 76  --    AST 13  --    ALT 18  --    ANIONGAP 8 12       Cardiac Markers: No results for input(s): CKMB, MYOGLOBIN, BNP, TROPISTAT in the last 48 hours.  Coagulation: No results for input(s): PT, INR, APTT in the last 48 hours.  Lactic Acid:   No results for input(s): LACTATE in the last 48 hours.    Lipase: No results for input(s): LIPASE in the last 48 hours.  Lipid Panel: No results for input(s): CHOL, HDL, LDLCALC, TRIG, CHOLHDL in the last 48 hours.  Magnesium:   Recent Labs   Lab 04/06/23  0403 04/07/23  0556   MG 1.5* 1.6       Respiratory  Culture: No results for input(s): GSRESP, RESPIRATORYC in the last 48 hours.  Troponin: No results for input(s): TROPONINI, TROPONINIHS in the last 48 hours.  Urine Culture: No results for input(s): LABURIN in the last 48 hours.  Urine Studies: No results for input(s): COLORU, APPEARANCEUA, PHUR, SPECGRAV, PROTEINUA, GLUCUA, KETONESU, BILIRUBINUA, OCCULTUA, NITRITE, UROBILINOGEN, LEUKOCYTESUR, RBCUA, WBCUA, BACTERIA, SQUAMEPITHEL, HYALINECASTS in the last 48 hours.    Invalid input(s): WRIGHTSUR    Significant Imaging:   MRI right foot  Mild degree of marrow edema with enhancement involving the 5th metatarsal head and the proximal phalanx of the 5th digit.  Differential diagnosis would include reactive hyperemia versus low-grade osteomyelitis.  The low signal intensity cortex is well maintained with respect to both of these osseous structures.     Diminished signal intensity without enhancement involving the distal phalanx of the 5th digit may represent some degree of sclerosis involving this phalanx.     Ulceration and edema in the soft tissues of the foot along the dorsal, lateral and plantar aspect of the foot at the level of the 5th digit and the plantar aspects of the 3rd and 4th digits.     Moderate arthritic changes of the 1st MTP joint.    XRAY foot  Soft tissue swelling and contour irregularity/soft tissue wound lateral to the 5th metatarsal with no underlying acute bony abnormality or bony destruction appreciated.             Assessment/Plan:      * Wound of right foot  ESR>120 and CRP>182    MRI right foot with edema and enhancement involving the 5th metatarsal head and the proximal phalanx of the 5th digit.  Reactive hyperemia versus low-grade osteomyelitis.Ulceration and edema in the soft tissues of the foot along the dorsal, lateral and plantar aspect of the foot at the level of the 5th digit and the plantar aspects of the 3rd and 4th digits.    Venous u/s right leg: negative for DVT     IV vanc, IV  zosyn   D/C IV fluids  General surgery consulted and debridement in the OR 4/5 with right toe amputation    Wound and blood cultures pending     4/7: surgery did right toe amputation 4/5  Surgery placed wound care recommendations for home and will follow up in outpatient wound care clinic (will call next week for appointment)  PO clindamycin for 7 days outpatient until follow up PCP and wound care         Cough  CXR with cardiomegaly but no consolidation present.      Better day of discharge. No signs of infection however      Hypertension  Not on home bp meds  Bp on higher side (component of IV fluids and pain)   D/c IV fluids.  Patient not in pain and bp still remains elevated  Will start procardia 30 and losartan     4/7: BP much better. Cont nifedipine and procardia. Not at long term goal but safe for d/c today on these meds and follow up PCP next week      Anemia  Stable this admit  Iron levels low.   po iron ordered-can d/c home on this      Hypoalbuminemia  Albumin 2.3 at admission  Dietician consulted       Leukocytosis  WBC 19.21 at admission  Currently 14.61    4/7: resolved. Due to infection. Home with PO clindamycin       Diabetes mellitus, type 2  Patient's FSGs are uncontrolled due to hyperglycemia on current medication regimen.  Last A1c reviewed-   Lab Results   Component Value Date    HGBA1C 12.8 (H) 04/04/2023     Most recent fingerstick glucose reviewed-   Recent Labs   Lab 04/06/23  1640 04/06/23  1946 04/07/23  0727 04/07/23  1112   POCTGLUCOSE 225* 310* 210* 228*     Current correctional scale  Medium  Increased anti-hyperglycemic dose as follows-   Antihyperglycemics (From admission, onward)    Start     Stop Route Frequency Ordered    04/06/23 2100  insulin detemir U-100 (Levemir) pen 14 Units         -- SubQ Nightly 04/06/23 1518    04/05/23 0817  insulin aspart U-100 pen 0-10 Units         -- SubQ Before meals & nightly PRN 04/05/23 0817        Hold Oral hypoglycemics while patient is in  the hospital.  New onset diabetes.  Diabetic educator consulted.      4/7: home with long acting insulin and metformin. Glucometer. Insulin teaching and glucometer teaching done prior to discharge. Long conversation about importance of glucose control for wound healing!!!        VTE Risk Mitigation (From admission, onward)    None          Discharge Planning   SORAIDA:      Code Status: Not on file   Is the patient medically ready for discharge?:     Reason for patient still in hospital (select all that apply): Pending disposition  Discharge Plan A: Home                  Pastora Moreno MD  Department of Hospital Medicine   [facility] Surg (3rd Fl)

## 2023-04-10 LAB — BACTERIA SPEC AEROBE CULT: ABNORMAL

## 2023-04-10 NOTE — PLAN OF CARE
[facility] - [facility] Surg (3rd Fl)  Discharge Final Note    Primary Care Provider: Alpa Santana DO    Expected Discharge Date: 4/7/2023    Final Discharge Note (most recent)       Final Note - 04/10/23 0925          Final Note    Assessment Type Final Discharge Note     Anticipated Discharge Disposition Home or Self Care     Hospital Resources/Appts/Education Provided Appointments scheduled and added to AVS        Post-Acute Status    Post-Acute Authorization Other     Other Status See Comments     Discharge Delays None known at this time                     Important Message from Medicare             Contact Info       Alpa Santana DO   Specialty: Family Medicine   Relationship: PCP - General    [address] [place] [address]   [address] [place] [address]   Phone: [phone]       Next Steps: Go on 4/14/2023    Instructions: Hospital Follow-up at 9:40am          Patient discharging home with a glucometer and outpatient wound care with [facility] at [facility]. He was also scheduled an appointment to establish with a PCP.

## 2023-04-11 LAB — VIT B1 BLD-MCNC: 66 UG/L (ref 38–122)

## 2023-04-12 ENCOUNTER — OFFICE VISIT (OUTPATIENT)
Dept: WOUND CARE | Facility: HOSPITAL | Age: 53
End: 2023-04-12
Attending: SURGERY
Payer: MEDICAID

## 2023-04-12 VITALS
SYSTOLIC BLOOD PRESSURE: 160 MMHG | RESPIRATION RATE: 18 BRPM | TEMPERATURE: 99 F | HEART RATE: 91 BPM | DIASTOLIC BLOOD PRESSURE: 87 MMHG

## 2023-04-12 DIAGNOSIS — L97.514 DIABETIC ULCER OF TOE OF RIGHT FOOT ASSOCIATED WITH TYPE 2 DIABETES MELLITUS, WITH NECROSIS OF BONE: ICD-10-CM

## 2023-04-12 DIAGNOSIS — E11.621 DIABETIC ULCER OF TOE OF RIGHT FOOT ASSOCIATED WITH TYPE 2 DIABETES MELLITUS, WITH NECROSIS OF BONE: ICD-10-CM

## 2023-04-12 DIAGNOSIS — E11.9 TYPE 2 DIABETES MELLITUS WITHOUT COMPLICATION: ICD-10-CM

## 2023-04-12 DIAGNOSIS — T81.31XA DISRUPTION OF EXTERNAL SURGICAL WOUND, INITIAL ENCOUNTER: Primary | ICD-10-CM

## 2023-04-12 PROCEDURE — 99214 OFFICE O/P EST MOD 30 MIN: CPT

## 2023-04-12 PROCEDURE — 11043 DBRDMT MUSC&/FSCA 1ST 20/<: CPT

## 2023-04-12 PROCEDURE — 11046 DBRDMT MUSC&/FSCA EA ADDL: CPT

## 2023-04-13 ENCOUNTER — PATIENT OUTREACH (OUTPATIENT)
Dept: ADMINISTRATIVE | Facility: HOSPITAL | Age: 53
End: 2023-04-13
Payer: MEDICAID

## 2023-04-14 ENCOUNTER — OFFICE VISIT (OUTPATIENT)
Dept: PRIMARY CARE CLINIC | Facility: CLINIC | Age: 53
End: 2023-04-14
Payer: MEDICAID

## 2023-04-14 VITALS
HEIGHT: 71 IN | SYSTOLIC BLOOD PRESSURE: 186 MMHG | OXYGEN SATURATION: 96 % | TEMPERATURE: 98 F | DIASTOLIC BLOOD PRESSURE: 91 MMHG | BODY MASS INDEX: 34.16 KG/M2 | HEART RATE: 103 BPM | RESPIRATION RATE: 16 BRPM | WEIGHT: 244 LBS

## 2023-04-14 DIAGNOSIS — S91.301A WOUND OF RIGHT FOOT: ICD-10-CM

## 2023-04-14 DIAGNOSIS — E11.9 TYPE 2 DIABETES MELLITUS WITHOUT COMPLICATION, UNSPECIFIED WHETHER LONG TERM INSULIN USE: ICD-10-CM

## 2023-04-14 DIAGNOSIS — Z13.220 ENCOUNTER FOR LIPID SCREENING FOR CARDIOVASCULAR DISEASE: ICD-10-CM

## 2023-04-14 DIAGNOSIS — D64.9 ANEMIA, UNSPECIFIED TYPE: Primary | ICD-10-CM

## 2023-04-14 DIAGNOSIS — I10 HYPERTENSION, UNSPECIFIED TYPE: ICD-10-CM

## 2023-04-14 DIAGNOSIS — Z13.6 ENCOUNTER FOR LIPID SCREENING FOR CARDIOVASCULAR DISEASE: ICD-10-CM

## 2023-04-14 DIAGNOSIS — E11.65 TYPE 2 DIABETES MELLITUS WITH HYPERGLYCEMIA, UNSPECIFIED WHETHER LONG TERM INSULIN USE: ICD-10-CM

## 2023-04-14 LAB
ALBUMIN/CREAT UR: 711.7 UG/MG (ref 0–30)
ANION GAP SERPL CALC-SCNC: 7 MMOL/L (ref 8–16)
BUN SERPL-MCNC: 13 MG/DL (ref 6–20)
CALCIUM SERPL-MCNC: 8.4 MG/DL (ref 8.7–10.5)
CHLORIDE SERPL-SCNC: 103 MMOL/L (ref 95–110)
CHOLEST SERPL-MCNC: 156 MG/DL (ref 120–199)
CHOLEST/HDLC SERPL: 2.8 {RATIO} (ref 2–5)
CO2 SERPL-SCNC: 31 MMOL/L (ref 23–29)
CREAT SERPL-MCNC: 1.2 MG/DL (ref 0.5–1.4)
CREAT UR-MCNC: 281 MG/DL (ref 23–375)
EST. GFR  (NO RACE VARIABLE): >60 ML/MIN/1.73 M^2
GLUCOSE SERPL-MCNC: 121 MG/DL (ref 70–110)
HDLC SERPL-MCNC: 56 MG/DL (ref 40–75)
HDLC SERPL: 35.9 % (ref 20–50)
LDLC SERPL CALC-MCNC: 75.4 MG/DL (ref 63–159)
MICROALBUMIN UR DL<=1MG/L-MCNC: 2000 MG/L
NONHDLC SERPL-MCNC: 100 MG/DL
POTASSIUM SERPL-SCNC: 3.2 MMOL/L (ref 3.5–5.1)
SODIUM SERPL-SCNC: 141 MMOL/L (ref 136–145)
TRIGL SERPL-MCNC: 123 MG/DL (ref 30–150)

## 2023-04-14 PROCEDURE — 3080F PR MOST RECENT DIASTOLIC BLOOD PRESSURE >= 90 MM HG: ICD-10-PCS | Mod: CPTII,,, | Performed by: STUDENT IN AN ORGANIZED HEALTH CARE EDUCATION/TRAINING PROGRAM

## 2023-04-14 PROCEDURE — 99999 PR PBB SHADOW E&M-EST. PATIENT-LVL V: ICD-10-PCS | Mod: PBBFAC,,, | Performed by: STUDENT IN AN ORGANIZED HEALTH CARE EDUCATION/TRAINING PROGRAM

## 2023-04-14 PROCEDURE — 80048 BASIC METABOLIC PNL TOTAL CA: CPT | Performed by: STUDENT IN AN ORGANIZED HEALTH CARE EDUCATION/TRAINING PROGRAM

## 2023-04-14 PROCEDURE — 3008F PR BODY MASS INDEX (BMI) DOCUMENTED: ICD-10-PCS | Mod: CPTII,,, | Performed by: STUDENT IN AN ORGANIZED HEALTH CARE EDUCATION/TRAINING PROGRAM

## 2023-04-14 PROCEDURE — 3080F DIAST BP >= 90 MM HG: CPT | Mod: CPTII,,, | Performed by: STUDENT IN AN ORGANIZED HEALTH CARE EDUCATION/TRAINING PROGRAM

## 2023-04-14 PROCEDURE — 3077F PR MOST RECENT SYSTOLIC BLOOD PRESSURE >= 140 MM HG: ICD-10-PCS | Mod: CPTII,,, | Performed by: STUDENT IN AN ORGANIZED HEALTH CARE EDUCATION/TRAINING PROGRAM

## 2023-04-14 PROCEDURE — 3046F PR MOST RECENT HEMOGLOBIN A1C LEVEL > 9.0%: ICD-10-PCS | Mod: CPTII,,, | Performed by: STUDENT IN AN ORGANIZED HEALTH CARE EDUCATION/TRAINING PROGRAM

## 2023-04-14 PROCEDURE — 4010F ACE/ARB THERAPY RXD/TAKEN: CPT | Mod: CPTII,,, | Performed by: STUDENT IN AN ORGANIZED HEALTH CARE EDUCATION/TRAINING PROGRAM

## 2023-04-14 PROCEDURE — 80061 LIPID PANEL: CPT | Performed by: STUDENT IN AN ORGANIZED HEALTH CARE EDUCATION/TRAINING PROGRAM

## 2023-04-14 PROCEDURE — 1111F PR DISCHARGE MEDS RECONCILED W/ CURRENT OUTPATIENT MED LIST: ICD-10-PCS | Mod: CPTII,,, | Performed by: STUDENT IN AN ORGANIZED HEALTH CARE EDUCATION/TRAINING PROGRAM

## 2023-04-14 PROCEDURE — 99204 OFFICE O/P NEW MOD 45 MIN: CPT | Mod: S$PBB,,, | Performed by: STUDENT IN AN ORGANIZED HEALTH CARE EDUCATION/TRAINING PROGRAM

## 2023-04-14 PROCEDURE — 1159F MED LIST DOCD IN RCRD: CPT | Mod: CPTII,,, | Performed by: STUDENT IN AN ORGANIZED HEALTH CARE EDUCATION/TRAINING PROGRAM

## 2023-04-14 PROCEDURE — 4010F PR ACE/ARB THEARPY RXD/TAKEN: ICD-10-PCS | Mod: CPTII,,, | Performed by: STUDENT IN AN ORGANIZED HEALTH CARE EDUCATION/TRAINING PROGRAM

## 2023-04-14 PROCEDURE — 82570 ASSAY OF URINE CREATININE: CPT | Performed by: STUDENT IN AN ORGANIZED HEALTH CARE EDUCATION/TRAINING PROGRAM

## 2023-04-14 PROCEDURE — 3077F SYST BP >= 140 MM HG: CPT | Mod: CPTII,,, | Performed by: STUDENT IN AN ORGANIZED HEALTH CARE EDUCATION/TRAINING PROGRAM

## 2023-04-14 PROCEDURE — 3008F BODY MASS INDEX DOCD: CPT | Mod: CPTII,,, | Performed by: STUDENT IN AN ORGANIZED HEALTH CARE EDUCATION/TRAINING PROGRAM

## 2023-04-14 PROCEDURE — 1111F DSCHRG MED/CURRENT MED MERGE: CPT | Mod: CPTII,,, | Performed by: STUDENT IN AN ORGANIZED HEALTH CARE EDUCATION/TRAINING PROGRAM

## 2023-04-14 PROCEDURE — 1160F PR REVIEW ALL MEDS BY PRESCRIBER/CLIN PHARMACIST DOCUMENTED: ICD-10-PCS | Mod: CPTII,,, | Performed by: STUDENT IN AN ORGANIZED HEALTH CARE EDUCATION/TRAINING PROGRAM

## 2023-04-14 PROCEDURE — 99215 OFFICE O/P EST HI 40 MIN: CPT | Mod: PBBFAC,PN | Performed by: STUDENT IN AN ORGANIZED HEALTH CARE EDUCATION/TRAINING PROGRAM

## 2023-04-14 PROCEDURE — 99999 PR PBB SHADOW E&M-EST. PATIENT-LVL V: CPT | Mod: PBBFAC,,, | Performed by: STUDENT IN AN ORGANIZED HEALTH CARE EDUCATION/TRAINING PROGRAM

## 2023-04-14 PROCEDURE — 3046F HEMOGLOBIN A1C LEVEL >9.0%: CPT | Mod: CPTII,,, | Performed by: STUDENT IN AN ORGANIZED HEALTH CARE EDUCATION/TRAINING PROGRAM

## 2023-04-14 PROCEDURE — 99204 PR OFFICE/OUTPT VISIT, NEW, LEVL IV, 45-59 MIN: ICD-10-PCS | Mod: S$PBB,,, | Performed by: STUDENT IN AN ORGANIZED HEALTH CARE EDUCATION/TRAINING PROGRAM

## 2023-04-14 PROCEDURE — 1159F PR MEDICATION LIST DOCUMENTED IN MEDICAL RECORD: ICD-10-PCS | Mod: CPTII,,, | Performed by: STUDENT IN AN ORGANIZED HEALTH CARE EDUCATION/TRAINING PROGRAM

## 2023-04-14 PROCEDURE — 1160F RVW MEDS BY RX/DR IN RCRD: CPT | Mod: CPTII,,, | Performed by: STUDENT IN AN ORGANIZED HEALTH CARE EDUCATION/TRAINING PROGRAM

## 2023-04-14 RX ORDER — METFORMIN HYDROCHLORIDE 1000 MG/1
1000 TABLET ORAL
Qty: 30 TABLET | Refills: 2 | Status: SHIPPED | OUTPATIENT
Start: 2023-04-14 | End: 2023-04-27 | Stop reason: SDUPTHER

## 2023-04-14 RX ORDER — METOPROLOL TARTRATE 25 MG/1
12.5 TABLET, FILM COATED ORAL 2 TIMES DAILY
Qty: 30 TABLET | Refills: 0 | Status: SHIPPED | OUTPATIENT
Start: 2023-04-14 | End: 2023-04-27 | Stop reason: SDUPTHER

## 2023-04-14 RX ORDER — ATORVASTATIN CALCIUM 20 MG/1
20 TABLET, FILM COATED ORAL DAILY
Qty: 90 TABLET | Refills: 3 | Status: SHIPPED | OUTPATIENT
Start: 2023-04-14 | End: 2023-04-27 | Stop reason: SDUPTHER

## 2023-04-14 RX ORDER — LOSARTAN POTASSIUM 25 MG/1
50 TABLET ORAL 2 TIMES DAILY
Qty: 120 TABLET | Refills: 0 | Status: SHIPPED | OUTPATIENT
Start: 2023-04-14 | End: 2023-04-27 | Stop reason: SDUPTHER

## 2023-04-14 NOTE — ASSESSMENT & PLAN NOTE
Continue with daily iron supplementation. Patient constipated. Encouraged increase daily hydration and may add daily stool softener. Counseled on food items to incorporate in daily meals.  - wheat germ, dried fruits, nuts and seeds, whole grain and fortified breads and cereals, dried beans, lentils, and split peas, leafy, dark-green vegetables, eggs  - continue daily iron supplementation, take with orange juice  - f/u 6-8 weeks with labs (cbc, cmp, iron panel, vit B12, folate, retic)

## 2023-04-14 NOTE — ASSESSMENT & PLAN NOTE
Patient tolerating metformin. Will increase metformin 1000 mg BID. Continue with basal insulin.   Educated on food groups to consider to modify diet.   - Start daily statin  - control serum glucose, BP control  - f/u 3 months with A1C with response to current regimen

## 2023-04-14 NOTE — PROGRESS NOTES
"[facility] Note    HPI:  Willam Laurent is a 53 y.o. male who presents today for Establish Care (Hospital admit follow up---)  53 year old male with hypertension, iron deficiency anemia, newly diagnosed diabetes mellitus type 2, s/p I&D and 5th metatarsal amputation (4/7/23) from complications of poor wound healing.  Patient states he is still taking clindamycin, feeling a lot better compared to when he was first hospitalized at [place]. He is established with wound care clinic at [place] and has had follow up visits.   Since his DM diagnosis he has stopped drinking daily coke (1 to 2 per day before), modifying diet as best he can.   Denies fever, chills, excessive headaches, vision changes, dizziness, chest pain, palpitations, shortness of breath, abdominal pain, nausea, vomiting, diarrhea, constipation.       ROS   A review of systems was performed and was negative except as noted above.    I personally reviewed allergies, past medical, surgical, social and family history and updated as appropriate.    Medications:    Current Outpatient Medications:     blood sugar diagnostic (ACCU-CHEK SMARTVIEW TEST STRIP) Strp, Check blood sugar TID, Disp: 100 strip, Rfl: 0    blood-glucose meter kit, Check blood sugar BID, Disp: 1 each, Rfl: 0    clindamycin (CLEOCIN) 300 MG capsule, Take 2 capsules (600 mg total) by mouth every 8 (eight) hours. for 7 days, Disp: 42 capsule, Rfl: 0    ferrous sulfate 325 (65 FE) MG EC tablet, Take 1 tablet (325 mg total) by mouth once daily., Disp: 30 tablet, Rfl: 0    insulin detemir U-100, Levemir, 100 unit/mL (3 mL) SubQ InPn pen, Inject 20 Units into the skin every evening., Disp: 6 mL, Rfl: 11    insulin syringe-needle U-100 (BD INSULIN SYRINGE) 1 mL 25 gauge x 5/8" Syrg, Inject insulin nightly, Disp: 100 each, Rfl: 0    lancets Misc, Check blood sugar BID, Disp: 100 each, Rfl: 1    NIFEdipine (PROCARDIA-XL) 30 MG (OSM) 24 hr tablet, Take 1 tablet (30 mg total) by " "mouth once daily., Disp: 30 tablet, Rfl: 0    pen needle, diabetic (BD ULTRA-FINE SHORT PEN NEEDLE) 31 gauge x 5/16" Ndle, Inject insulin nightly, Disp: 100 each, Rfl: 1    atorvastatin (LIPITOR) 20 MG tablet, Take 1 tablet (20 mg total) by mouth once daily., Disp: 90 tablet, Rfl: 3    losartan (COZAAR) 25 MG tablet, Take 2 tablets (50 mg total) by mouth 2 (two) times a day., Disp: 120 tablet, Rfl: 0    metFORMIN (GLUCOPHAGE) 1000 MG tablet, Take 1 tablet (1,000 mg total) by mouth daily with breakfast., Disp: 30 tablet, Rfl: 2    metoprolol tartrate (LOPRESSOR) 25 MG tablet, Take 0.5 tablets (12.5 mg total) by mouth 2 (two) times daily., Disp: 30 tablet, Rfl: 0     Health Maintenance:    There is no immunization history on file for this patient.   Health Maintenance   Topic Date Due    Hepatitis C Screening  Never done    Lipid Panel  Never done    Foot Exam  Never done    Eye Exam  Never done    TETANUS VACCINE  Never done    Hemoglobin A1c  07/04/2023    Low Dose Statin  04/14/2024     Health Maintenance Topics with due status: Not Due       Topic Last Completion Date    Hemoglobin A1c 04/04/2023    Low Dose Statin 04/14/2023     Health Maintenance Due   Topic Date Due    Hepatitis C Screening  Never done    Lipid Panel  Never done    Diabetes Urine Screening  Never done    Pneumococcal Vaccines (Age 0-64) (1 - PCV) Never done    Foot Exam  Never done    Eye Exam  Never done    HIV Screening  Never done    TETANUS VACCINE  Never done    Colorectal Cancer Screening  Never done    Shingles Vaccine (1 of 2) Never done     PHYSICAL EXAM:  Vitals:    04/14/23 0947 04/14/23 1002   BP: (!) 169/86 (!) 186/91   BP Location: Left arm Left arm   Patient Position: Sitting Sitting   BP Method: Large (Automatic) Large (Automatic)   Pulse: 103    Resp: 16    Temp: 98.1 °F (36.7 °C)    TempSrc: Oral    SpO2: 96%    Weight: 110.7 kg (244 lb)    Height: 5' 11" (1.803 m)      Body mass index is 34.03 kg/m².  Physical Exam  Vitals " and nursing note reviewed.   Constitutional:       General: He is not in acute distress.     Appearance: Normal appearance. He is not toxic-appearing.   HENT:      Head: Normocephalic and atraumatic.      Right Ear: External ear normal.      Left Ear: External ear normal.      Nose: Nose normal.      Mouth/Throat:      Mouth: Mucous membranes are moist.      Pharynx: Oropharynx is clear.   Eyes:      Extraocular Movements: Extraocular movements intact.      Conjunctiva/sclera: Conjunctivae normal.   Cardiovascular:      Rate and Rhythm: Normal rate and regular rhythm.      Pulses: Normal pulses.      Heart sounds: Normal heart sounds. No murmur heard.  Pulmonary:      Effort: Pulmonary effort is normal. No respiratory distress.      Breath sounds: Normal breath sounds. No wheezing or rales.   Abdominal:      General: Abdomen is flat. Bowel sounds are normal.      Palpations: Abdomen is soft. There is no mass.      Tenderness: There is no right CVA tenderness or left CVA tenderness.   Musculoskeletal:         General: No swelling or tenderness.      Cervical back: Normal range of motion and neck supple. No rigidity.      Right lower leg: No edema.      Left lower leg: No edema.   Skin:     General: Skin is warm and dry.   Neurological:      General: No focal deficit present.      Mental Status: He is alert and oriented to person, place, and time.      Motor: No weakness.      Gait: Gait normal.   Psychiatric:         Mood and Affect: Mood normal.         Behavior: Behavior normal.         Thought Content: Thought content normal.         Judgment: Judgment normal.      ASSESSMENT/PLAN:  1. Anemia, unspecified type  Assessment & Plan:  Continue with daily iron supplementation. Patient constipated. Encouraged increase daily hydration and may add daily stool softener. Counseled on food items to incorporate in daily meals.  - wheat germ, dried fruits, nuts and seeds, whole grain and fortified breads and cereals, dried  beans, lentils, and split peas, leafy, dark-green vegetables, eggs  - continue daily iron supplementation, take with orange juice  - f/u 6-8 weeks with labs (cbc, cmp, iron panel, vit B12, folate, retic)       2. Hypertension, unspecified type  Assessment & Plan:  BP elevated. Dose readjustments.  - procardia ER 30 mg daily  - losartan 100 mg  - metoprolol 12.5 mg BID  - f/u 2 weeks for BP check    Orders:  -     Basic Metabolic Panel; Future; Expected date: 04/14/2023    3. Type 2 diabetes mellitus with hyperglycemia, unspecified whether long term insulin use  Overview:  Diagnosed in 4/2023  Current diabetic medications:  - metformin 1000 mg daily  - basal insulin 20U QHS  Known diabetic complications: poor wound healing s/p 5th metatarsal amputation on 4/7/23  Weight trend: stable  Blood glucose monitoring at home: Not started yet, POC glucose with glucometer QACHS  Statin: Taking  ACE/ARB: Taking     Screening or Prevention Patient's value   HgA1C Testing and Control   Lab Results   Component Value Date    HGBA1C 12.8 (H) 04/04/2023        Lipid profile Most Recent Lipid Panel Health Maintenance Topic Completion: Not Found   LDL control No results found for: LDLCALC   Nephropathy screening No results found for: MICALBCREAT   Blood pressure BP Readings from Last 1 Encounters:   04/14/23 (!) 186/91      Dilated retinal exam Most Recent Eye Exam Date: Not Found   Foot exam Most Recent Foot Exam Date: Not Found       Assessment & Plan:  Patient tolerating metformin. Will increase metformin 1000 mg BID. Continue with basal insulin.   Educated on food groups to consider to modify diet.   - Start daily statin  - control serum glucose, BP control  - f/u 3 months with A1C with response to current regimen    Orders:  -     Basic Metabolic Panel; Future; Expected date: 04/14/2023  -     metFORMIN (GLUCOPHAGE) 1000 MG tablet; Take 1 tablet (1,000 mg total) by mouth daily with breakfast.  Dispense: 30 tablet; Refill: 2    4.  Type 2 diabetes mellitus without complication, unspecified whether long term insulin use  Overview:  Diagnosed in 4/2023  Current diabetic medications:  - metformin 1000 mg daily  - basal insulin 20U QHS  Known diabetic complications: poor wound healing s/p 5th metatarsal amputation on 4/7/23  Weight trend: stable  Blood glucose monitoring at home: Not started yet, POC glucose with glucometer QACHS  Statin: Taking  ACE/ARB: Taking     Screening or Prevention Patient's value   HgA1C Testing and Control   Lab Results   Component Value Date    HGBA1C 12.8 (H) 04/04/2023        Lipid profile Most Recent Lipid Panel Health Maintenance Topic Completion: Not Found   LDL control No results found for: LDLCALC   Nephropathy screening No results found for: MICALBCREAT   Blood pressure BP Readings from Last 1 Encounters:   04/14/23 (!) 186/91      Dilated retinal exam Most Recent Eye Exam Date: Not Found   Foot exam Most Recent Foot Exam Date: Not Found       Assessment & Plan:  Patient tolerating metformin. Will increase metformin 1000 mg BID. Continue with basal insulin.   Educated on food groups to consider to modify diet.   - Start daily statin  - control serum glucose, BP control  - f/u 3 months with A1C with response to current regimen    Orders:  -     Microalbumin/creatinine urine ratio    5. Encounter for lipid screening for cardiovascular disease  -     Lipid Panel; Future; Expected date: 04/14/2023  -     atorvastatin (LIPITOR) 20 MG tablet; Take 1 tablet (20 mg total) by mouth once daily.  Dispense: 90 tablet; Refill: 3    6. Wound of right foot    7. BMI 34.0-34.9,adult  Overview:  Wt Readings from Last 8 Encounters:   04/14/23 110.7 kg (244 lb)   04/04/23 111 kg (244 lb 11.4 oz)   04/04/23 106.6 kg (235 lb)   04/04/23 106.6 kg (235 lb 0.2 oz)       Assessment & Plan:  Recommendations:   Stay physically active. As tolerated alternate resistance training with stretching and cardio. Goal of 150 minutes per week of  moderate intensity activity or 7,500 - 10,000 steps per day. Follow the Mediterranean Diet. Include whole fresh fruits, vegetables, olive oil, seeds, nuts, whole grains, cold water fish, salmon, mackerel and lean cuts of meat.  Do not drink sugary/diet carbonated beverages. Decrease portion sizes slightly which will result in an approximately 500-calorie deficit. Avoid fast or fried and processed food, especially canned foods. Avoid refined carbohydrates, white starchy foods, flour, white potato, bread, muffins, and cakes. Consider substituting one meal a day with a meal replacement such as Slim fast, lean cuisine, or weight watcher's. Follow a healthy diet that includes enough calcium, vitamin D and proteins for bone health.        Other orders  -     losartan (COZAAR) 25 MG tablet; Take 2 tablets (50 mg total) by mouth 2 (two) times a day.  Dispense: 120 tablet; Refill: 0  -     metoprolol tartrate (LOPRESSOR) 25 MG tablet; Take 0.5 tablets (12.5 mg total) by mouth 2 (two) times daily.  Dispense: 30 tablet; Refill: 0        Other than changes above, continue current medications and maintain follow up with specialists.      Follow up in about 2 weeks (around 4/28/2023) for BP check.   Recent Results (from the past 2016 hour(s))   POCT glucose    Collection Time: 04/04/23 10:39 AM   Result Value Ref Range    POCT Glucose 366 (H) 70 - 110 mg/dL   Blood culture x two cultures. Draw prior to antibiotics.    Collection Time: 04/04/23 12:20 PM    Specimen: Peripheral, Antecubital, Right; Blood   Result Value Ref Range    Blood Culture, Routine No growth after 5 days.    CBC auto differential    Collection Time: 04/04/23 12:24 PM   Result Value Ref Range    WBC 19.21 (H) 3.90 - 12.70 K/uL    RBC 4.38 (L) 4.60 - 6.20 M/uL    Hemoglobin 10.8 (L) 14.0 - 18.0 g/dL    Hematocrit 33.6 (L) 40.0 - 54.0 %    MCV 77 (L) 82 - 98 fL    MCH 24.7 (L) 27.0 - 31.0 pg    MCHC 32.1 32.0 - 36.0 g/dL    RDW 13.1 11.5 - 14.5 %    Platelets  454 (H) 150 - 450 K/uL    MPV 8.9 (L) 9.2 - 12.9 fL    Immature Granulocytes 1.0 (H) 0.0 - 0.5 %    Gran # (ANC) 16.3 (H) 1.8 - 7.7 K/uL    Immature Grans (Abs) 0.19 (H) 0.00 - 0.04 K/uL    Lymph # 1.0 1.0 - 4.8 K/uL    Mono # 1.6 (H) 0.3 - 1.0 K/uL    Eos # 0.0 0.0 - 0.5 K/uL    Baso # 0.05 0.00 - 0.20 K/uL    nRBC 0 0 /100 WBC    Gran % 84.9 (H) 38.0 - 73.0 %    Lymph % 5.2 (L) 18.0 - 48.0 %    Mono % 8.5 4.0 - 15.0 %    Eosinophil % 0.1 0.0 - 8.0 %    Basophil % 0.3 0.0 - 1.9 %    Differential Method Automated    Comprehensive metabolic panel    Collection Time: 04/04/23 12:24 PM   Result Value Ref Range    Sodium 131 (L) 136 - 145 mmol/L    Potassium 3.9 3.5 - 5.1 mmol/L    Chloride 95 95 - 110 mmol/L    CO2 27 23 - 29 mmol/L    Glucose 378 (H) 70 - 110 mg/dL    BUN 16 6 - 20 mg/dL    Creatinine 1.3 0.5 - 1.4 mg/dL    Calcium 9.2 8.7 - 10.5 mg/dL    Total Protein 7.4 6.0 - 8.4 g/dL    Albumin 2.3 (L) 3.5 - 5.2 g/dL    Total Bilirubin 0.4 0.1 - 1.0 mg/dL    Alkaline Phosphatase 96 55 - 135 U/L    AST 17 10 - 40 U/L    ALT 19 10 - 44 U/L    Anion Gap 9 8 - 16 mmol/L    eGFR >60.0 >60 mL/min/1.73 m^2   Lactic acid, plasma    Collection Time: 04/04/23 12:24 PM   Result Value Ref Range    Lactate (Lactic Acid) 1.1 0.5 - 2.2 mmol/L   Hemoglobin A1c    Collection Time: 04/04/23 12:24 PM   Result Value Ref Range    Hemoglobin A1C 12.8 (H) 4.0 - 5.6 %    Estimated Avg Glucose 321 (H) 68 - 131 mg/dL   C-reactive protein    Collection Time: 04/04/23 12:24 PM   Result Value Ref Range    .5 (H) 0.0 - 8.2 mg/L   Sedimentation rate    Collection Time: 04/04/23 12:24 PM   Result Value Ref Range    Sed Rate >120 (H) 0 - 23 mm/Hr   Beta - Hydroxybutyrate, Serum    Collection Time: 04/04/23 12:24 PM   Result Value Ref Range    Beta-Hydroxybutyrate 0.8 (H) 0.0 - 0.5 mmol/L   Blood culture x two cultures. Draw prior to antibiotics.    Collection Time: 04/04/23 12:27 PM    Specimen: Peripheral, Wrist, Left; Blood   Result  Value Ref Range    Blood Culture, Routine No growth after 5 days.    POCT Venous Blood Gas Once    Collection Time: 04/04/23  2:03 PM   Result Value Ref Range    POC VBG Source Venous     POC COHb 1.7 0 - 3.0 %    POC MetHb 0.4 0 - 1.5 %    POC pH Venous 7.400 7.320 - 7.420    POC pCO2 Venous 52 (H) 41 - 51 mmHg    POC THb 9.6 (L) 12 - 18 g/dL    POC Saturated O2 Venous 46.3 (L) 90 - 100 %    POC pO2 Venous 33 30 - 100 mmHg    POC pO2 Mixed Venous      POC O2Hb Venous 45.3 41 - 85 %    POC O2Hb Mixed Venous      POC TCO2 33.8 mmol/L    POC BE 6.40 (H) -2.00 - 2.00 mmol/L    POC HCO3 Venous 32.20 25.00 - 40.00 mmol/L    Site Vein     DelSys Room Air     Allens Test NA    CBC auto differential    Collection Time: 04/05/23  6:23 AM   Result Value Ref Range    WBC 15.96 (H) 3.90 - 12.70 K/uL    RBC 3.95 (L) 4.60 - 6.20 M/uL    Hemoglobin 9.6 (L) 14.0 - 18.0 g/dL    Hematocrit 31.1 (L) 40.0 - 54.0 %    MCV 79 (L) 82 - 98 fL    MCH 24.3 (L) 27.0 - 31.0 pg    MCHC 30.9 (L) 32.0 - 36.0 g/dL    RDW 13.2 11.5 - 14.5 %    Platelets 420 150 - 450 K/uL    MPV 8.9 (L) 9.2 - 12.9 fL    Immature Granulocytes 1.2 (H) 0.0 - 0.5 %    Gran # (ANC) 12.9 (H) 1.8 - 7.7 K/uL    Immature Grans (Abs) 0.19 (H) 0.00 - 0.04 K/uL    Lymph # 1.3 1.0 - 4.8 K/uL    Mono # 1.5 (H) 0.3 - 1.0 K/uL    Eos # 0.1 0.0 - 0.5 K/uL    Baso # 0.05 0.00 - 0.20 K/uL    nRBC 0 0 /100 WBC    Gran % 80.5 (H) 38.0 - 73.0 %    Lymph % 8.3 (L) 18.0 - 48.0 %    Mono % 9.3 4.0 - 15.0 %    Eosinophil % 0.4 0.0 - 8.0 %    Basophil % 0.3 0.0 - 1.9 %    Differential Method Automated    Comprehensive metabolic panel    Collection Time: 04/05/23  6:23 AM   Result Value Ref Range    Sodium 134 (L) 136 - 145 mmol/L    Potassium 4.2 3.5 - 5.1 mmol/L    Chloride 102 95 - 110 mmol/L    CO2 22 (L) 23 - 29 mmol/L    Glucose 323 (H) 70 - 110 mg/dL    BUN 12 6 - 20 mg/dL    Creatinine 1.1 0.5 - 1.4 mg/dL    Calcium 8.4 (L) 8.7 - 10.5 mg/dL    Total Protein 6.5 6.0 - 8.4 g/dL     Albumin 1.9 (L) 3.5 - 5.2 g/dL    Total Bilirubin 0.3 0.1 - 1.0 mg/dL    Alkaline Phosphatase 68 55 - 135 U/L    AST 11 10 - 40 U/L    ALT 17 10 - 44 U/L    Anion Gap 10 8 - 16 mmol/L    eGFR >60 >60 mL/min/1.73 m^2   POCT glucose    Collection Time: 04/05/23  7:40 AM   Result Value Ref Range    POCT Glucose 294 (H) 70 - 110 mg/dL   POCT glucose    Collection Time: 04/05/23 11:57 AM   Result Value Ref Range    POCT Glucose 229 (H) 70 - 110 mg/dL   POCT glucose    Collection Time: 04/05/23  4:09 PM   Result Value Ref Range    POCT Glucose 188 (H) 70 - 110 mg/dL   POCT glucose    Collection Time: 04/05/23  7:55 PM   Result Value Ref Range    POCT Glucose 310 (H) 70 - 110 mg/dL   CBC auto differential    Collection Time: 04/06/23  4:03 AM   Result Value Ref Range    WBC 14.61 (H) 3.90 - 12.70 K/uL    RBC 4.11 (L) 4.60 - 6.20 M/uL    Hemoglobin 10.1 (L) 14.0 - 18.0 g/dL    Hematocrit 32.5 (L) 40.0 - 54.0 %    MCV 79 (L) 82 - 98 fL    MCH 24.6 (L) 27.0 - 31.0 pg    MCHC 31.1 (L) 32.0 - 36.0 g/dL    RDW 13.4 11.5 - 14.5 %    Platelets 480 (H) 150 - 450 K/uL    MPV 9.6 9.2 - 12.9 fL    Immature Granulocytes 1.3 (H) 0.0 - 0.5 %    Gran # (ANC) 11.8 (H) 1.8 - 7.7 K/uL    Immature Grans (Abs) 0.19 (H) 0.00 - 0.04 K/uL    Lymph # 1.3 1.0 - 4.8 K/uL    Mono # 1.1 (H) 0.3 - 1.0 K/uL    Eos # 0.2 0.0 - 0.5 K/uL    Baso # 0.06 0.00 - 0.20 K/uL    nRBC 0 0 /100 WBC    Gran % 80.7 (H) 38.0 - 73.0 %    Lymph % 8.8 (L) 18.0 - 48.0 %    Mono % 7.7 4.0 - 15.0 %    Eosinophil % 1.1 0.0 - 8.0 %    Basophil % 0.4 0.0 - 1.9 %    Platelet Estimate Increased (A)     Differential Method Automated    Magnesium    Collection Time: 04/06/23  4:03 AM   Result Value Ref Range    Magnesium 1.5 (L) 1.6 - 2.6 mg/dL   Comprehensive metabolic panel    Collection Time: 04/06/23  4:03 AM   Result Value Ref Range    Sodium 136 136 - 145 mmol/L    Potassium 4.1 3.5 - 5.1 mmol/L    Chloride 104 95 - 110 mmol/L    CO2 24 23 - 29 mmol/L    Glucose 254 (H) 70 -  110 mg/dL    BUN 10 6 - 20 mg/dL    Creatinine 1.2 0.5 - 1.4 mg/dL    Calcium 8.5 (L) 8.7 - 10.5 mg/dL    Total Protein 7.0 6.0 - 8.4 g/dL    Albumin 2.0 (L) 3.5 - 5.2 g/dL    Total Bilirubin 0.3 0.1 - 1.0 mg/dL    Alkaline Phosphatase 76 55 - 135 U/L    AST 13 10 - 40 U/L    ALT 18 10 - 44 U/L    Anion Gap 8 8 - 16 mmol/L    eGFR >60 >60 mL/min/1.73 m^2   Iron and TIBC    Collection Time: 04/06/23  4:03 AM   Result Value Ref Range    Iron 14 (L) 45 - 160 ug/dL    Transferrin 115 (L) 200 - 375 mg/dL    TIBC 170 (L) 250 - 450 ug/dL    Saturated Iron 8 (L) 20 - 50 %   Ferritin    Collection Time: 04/06/23  4:03 AM   Result Value Ref Range    Ferritin 641 (H) 20.0 - 300.0 ng/mL   Vitamin B12    Collection Time: 04/06/23  4:03 AM   Result Value Ref Range    Vitamin B-12 1772 (H) 210 - 950 pg/mL   Folate    Collection Time: 04/06/23  4:03 AM   Result Value Ref Range    Folate 14.4 4.0 - 24.0 ng/mL   Vitamin B1    Collection Time: 04/06/23  4:03 AM   Result Value Ref Range    Thiamine 66 38 - 122 ug/L   VANCOMYCIN, TROUGH    Collection Time: 04/06/23  4:03 AM   Result Value Ref Range    Vancomycin-Trough 15.3 10.0 - 22.0 ug/mL   POCT glucose    Collection Time: 04/06/23  7:12 AM   Result Value Ref Range    POCT Glucose 213 (H) 70 - 110 mg/dL   POCT glucose    Collection Time: 04/06/23 11:11 AM   Result Value Ref Range    POCT Glucose 222 (H) 70 - 110 mg/dL   Aerobic culture    Collection Time: 04/06/23 11:37 AM    Specimen: Foot, Right; Wound   Result Value Ref Range    Aerobic Bacterial Culture (A)      STREPTOCOCCUS AGALACTIAE (GROUP B)  Few  Beta-hemolytic streptococci are routinely susceptible to   penicillins,cephalosporins and carbapenems.  Susceptibility testing not routinely performed     POCT glucose    Collection Time: 04/06/23  4:40 PM   Result Value Ref Range    POCT Glucose 225 (H) 70 - 110 mg/dL   POCT glucose    Collection Time: 04/06/23  7:46 PM   Result Value Ref Range    POCT Glucose 310 (H) 70 - 110  mg/dL   CBC auto differential    Collection Time: 04/07/23  5:56 AM   Result Value Ref Range    WBC 9.88 3.90 - 12.70 K/uL    RBC 4.27 (L) 4.60 - 6.20 M/uL    Hemoglobin 10.3 (L) 14.0 - 18.0 g/dL    Hematocrit 34.0 (L) 40.0 - 54.0 %    MCV 80 (L) 82 - 98 fL    MCH 24.1 (L) 27.0 - 31.0 pg    MCHC 30.3 (L) 32.0 - 36.0 g/dL    RDW 13.3 11.5 - 14.5 %    Platelets 506 (H) 150 - 450 K/uL    MPV 9.1 (L) 9.2 - 12.9 fL    Immature Granulocytes 1.9 (H) 0.0 - 0.5 %    Gran # (ANC) 6.8 1.8 - 7.7 K/uL    Immature Grans (Abs) 0.19 (H) 0.00 - 0.04 K/uL    Lymph # 1.5 1.0 - 4.8 K/uL    Mono # 1.1 (H) 0.3 - 1.0 K/uL    Eos # 0.3 0.0 - 0.5 K/uL    Baso # 0.08 0.00 - 0.20 K/uL    nRBC 0 0 /100 WBC    Gran % 68.5 38.0 - 73.0 %    Lymph % 15.0 (L) 18.0 - 48.0 %    Mono % 10.9 4.0 - 15.0 %    Eosinophil % 2.9 0.0 - 8.0 %    Basophil % 0.8 0.0 - 1.9 %    Differential Method Automated    Basic metabolic panel    Collection Time: 04/07/23  5:56 AM   Result Value Ref Range    Sodium 139 136 - 145 mmol/L    Potassium 3.9 3.5 - 5.1 mmol/L    Chloride 104 95 - 110 mmol/L    CO2 23 23 - 29 mmol/L    Glucose 217 (H) 70 - 110 mg/dL    BUN 9 6 - 20 mg/dL    Creatinine 1.0 0.5 - 1.4 mg/dL    Calcium 8.9 8.7 - 10.5 mg/dL    Anion Gap 12 8 - 16 mmol/L    eGFR >60 >60 mL/min/1.73 m^2   Magnesium    Collection Time: 04/07/23  5:56 AM   Result Value Ref Range    Magnesium 1.6 1.6 - 2.6 mg/dL   POCT glucose    Collection Time: 04/07/23  7:27 AM   Result Value Ref Range    POCT Glucose 210 (H) 70 - 110 mg/dL   POCT glucose    Collection Time: 04/07/23 11:12 AM   Result Value Ref Range    POCT Glucose 228 (H) 70 - 110 mg/dL   Basic Metabolic Panel    Collection Time: 04/14/23 11:10 AM   Result Value Ref Range    Sodium 141 136 - 145 mmol/L    Potassium 3.2 (L) 3.5 - 5.1 mmol/L    Chloride 103 95 - 110 mmol/L    CO2 31 (H) 23 - 29 mmol/L    Glucose 121 (H) 70 - 110 mg/dL    BUN 13 6 - 20 mg/dL    Creatinine 1.2 0.5 - 1.4 mg/dL    Calcium 8.4 (L) 8.7 - 10.5  mg/dL    Anion Gap 7 (L) 8 - 16 mmol/L    eGFR >60.0 >60 mL/min/1.73 m^2       DO [facility]

## 2023-04-14 NOTE — ASSESSMENT & PLAN NOTE
BP elevated. Dose readjustments.  - procardia ER 30 mg daily  - losartan 100 mg  - metoprolol 12.5 mg BID  - f/u 2 weeks for BP check

## 2023-04-15 ENCOUNTER — PATIENT MESSAGE (OUTPATIENT)
Dept: ADMINISTRATIVE | Facility: HOSPITAL | Age: 53
End: 2023-04-15
Payer: MEDICAID

## 2023-04-18 LAB
FINAL PATHOLOGIC DIAGNOSIS: NORMAL
GROSS: NORMAL
Lab: NORMAL

## 2023-04-19 ENCOUNTER — OFFICE VISIT (OUTPATIENT)
Dept: WOUND CARE | Facility: HOSPITAL | Age: 53
End: 2023-04-19
Attending: SURGERY
Payer: MEDICAID

## 2023-04-19 ENCOUNTER — OFFICE VISIT (OUTPATIENT)
Dept: PRIMARY CARE CLINIC | Facility: CLINIC | Age: 53
End: 2023-04-19
Payer: MEDICAID

## 2023-04-19 VITALS
OXYGEN SATURATION: 97 % | RESPIRATION RATE: 20 BRPM | HEART RATE: 82 BPM | DIASTOLIC BLOOD PRESSURE: 73 MMHG | SYSTOLIC BLOOD PRESSURE: 159 MMHG

## 2023-04-19 VITALS
DIASTOLIC BLOOD PRESSURE: 68 MMHG | HEART RATE: 80 BPM | RESPIRATION RATE: 18 BRPM | TEMPERATURE: 98 F | SYSTOLIC BLOOD PRESSURE: 136 MMHG

## 2023-04-19 DIAGNOSIS — I10 HYPERTENSION, UNSPECIFIED TYPE: Primary | ICD-10-CM

## 2023-04-19 DIAGNOSIS — E11.621 DIABETIC ULCER OF TOE OF RIGHT FOOT ASSOCIATED WITH TYPE 2 DIABETES MELLITUS, WITH NECROSIS OF BONE: ICD-10-CM

## 2023-04-19 DIAGNOSIS — E11.65 TYPE 2 DIABETES MELLITUS WITH HYPERGLYCEMIA, UNSPECIFIED WHETHER LONG TERM INSULIN USE: ICD-10-CM

## 2023-04-19 DIAGNOSIS — L97.514 DIABETIC ULCER OF TOE OF RIGHT FOOT ASSOCIATED WITH TYPE 2 DIABETES MELLITUS, WITH NECROSIS OF BONE: ICD-10-CM

## 2023-04-19 DIAGNOSIS — S91.301A WOUND OF RIGHT FOOT: ICD-10-CM

## 2023-04-19 DIAGNOSIS — E87.6 HYPOKALEMIA: ICD-10-CM

## 2023-04-19 DIAGNOSIS — E78.2 HYPERCHOLESTEROLEMIA WITH HYPERGLYCERIDEMIA: ICD-10-CM

## 2023-04-19 DIAGNOSIS — T81.31XA DISRUPTION OF EXTERNAL SURGICAL WOUND, INITIAL ENCOUNTER: Primary | ICD-10-CM

## 2023-04-19 DIAGNOSIS — D64.9 ANEMIA, UNSPECIFIED TYPE: ICD-10-CM

## 2023-04-19 PROCEDURE — 3066F PR DOCUMENTATION OF TREATMENT FOR NEPHROPATHY: ICD-10-PCS | Mod: CPTII,,, | Performed by: STUDENT IN AN ORGANIZED HEALTH CARE EDUCATION/TRAINING PROGRAM

## 2023-04-19 PROCEDURE — 3046F PR MOST RECENT HEMOGLOBIN A1C LEVEL > 9.0%: ICD-10-PCS | Mod: CPTII,,, | Performed by: STUDENT IN AN ORGANIZED HEALTH CARE EDUCATION/TRAINING PROGRAM

## 2023-04-19 PROCEDURE — 4010F PR ACE/ARB THEARPY RXD/TAKEN: ICD-10-PCS | Mod: CPTII,,, | Performed by: STUDENT IN AN ORGANIZED HEALTH CARE EDUCATION/TRAINING PROGRAM

## 2023-04-19 PROCEDURE — 3046F HEMOGLOBIN A1C LEVEL >9.0%: CPT | Mod: CPTII,,, | Performed by: STUDENT IN AN ORGANIZED HEALTH CARE EDUCATION/TRAINING PROGRAM

## 2023-04-19 PROCEDURE — 99212 OFFICE O/P EST SF 10 MIN: CPT | Mod: PBBFAC,PN,25 | Performed by: STUDENT IN AN ORGANIZED HEALTH CARE EDUCATION/TRAINING PROGRAM

## 2023-04-19 PROCEDURE — 4010F ACE/ARB THERAPY RXD/TAKEN: CPT | Mod: CPTII,,, | Performed by: STUDENT IN AN ORGANIZED HEALTH CARE EDUCATION/TRAINING PROGRAM

## 2023-04-19 PROCEDURE — 3066F NEPHROPATHY DOC TX: CPT | Mod: CPTII,,, | Performed by: STUDENT IN AN ORGANIZED HEALTH CARE EDUCATION/TRAINING PROGRAM

## 2023-04-19 PROCEDURE — 11043 DBRDMT MUSC&/FSCA 1ST 20/<: CPT

## 2023-04-19 PROCEDURE — 3077F SYST BP >= 140 MM HG: CPT | Mod: CPTII,,, | Performed by: STUDENT IN AN ORGANIZED HEALTH CARE EDUCATION/TRAINING PROGRAM

## 2023-04-19 PROCEDURE — 3062F POS MACROALBUMINURIA REV: CPT | Mod: CPTII,,, | Performed by: STUDENT IN AN ORGANIZED HEALTH CARE EDUCATION/TRAINING PROGRAM

## 2023-04-19 PROCEDURE — 99999 PR PBB SHADOW E&M-EST. PATIENT-LVL II: CPT | Mod: PBBFAC,,, | Performed by: STUDENT IN AN ORGANIZED HEALTH CARE EDUCATION/TRAINING PROGRAM

## 2023-04-19 PROCEDURE — 99213 OFFICE O/P EST LOW 20 MIN: CPT | Mod: S$PBB,,, | Performed by: STUDENT IN AN ORGANIZED HEALTH CARE EDUCATION/TRAINING PROGRAM

## 2023-04-19 PROCEDURE — 99213 PR OFFICE/OUTPT VISIT, EST, LEVL III, 20-29 MIN: ICD-10-PCS | Mod: S$PBB,,, | Performed by: STUDENT IN AN ORGANIZED HEALTH CARE EDUCATION/TRAINING PROGRAM

## 2023-04-19 PROCEDURE — 1111F DSCHRG MED/CURRENT MED MERGE: CPT | Mod: CPTII,,, | Performed by: STUDENT IN AN ORGANIZED HEALTH CARE EDUCATION/TRAINING PROGRAM

## 2023-04-19 PROCEDURE — 11046 DBRDMT MUSC&/FSCA EA ADDL: CPT

## 2023-04-19 PROCEDURE — 3062F PR POS MACROALBUMINURIA RESULT DOCUMENTED/REVIEW: ICD-10-PCS | Mod: CPTII,,, | Performed by: STUDENT IN AN ORGANIZED HEALTH CARE EDUCATION/TRAINING PROGRAM

## 2023-04-19 PROCEDURE — 3077F PR MOST RECENT SYSTOLIC BLOOD PRESSURE >= 140 MM HG: ICD-10-PCS | Mod: CPTII,,, | Performed by: STUDENT IN AN ORGANIZED HEALTH CARE EDUCATION/TRAINING PROGRAM

## 2023-04-19 PROCEDURE — 3078F PR MOST RECENT DIASTOLIC BLOOD PRESSURE < 80 MM HG: ICD-10-PCS | Mod: CPTII,,, | Performed by: STUDENT IN AN ORGANIZED HEALTH CARE EDUCATION/TRAINING PROGRAM

## 2023-04-19 PROCEDURE — 3078F DIAST BP <80 MM HG: CPT | Mod: CPTII,,, | Performed by: STUDENT IN AN ORGANIZED HEALTH CARE EDUCATION/TRAINING PROGRAM

## 2023-04-19 PROCEDURE — 1111F PR DISCHARGE MEDS RECONCILED W/ CURRENT OUTPATIENT MED LIST: ICD-10-PCS | Mod: CPTII,,, | Performed by: STUDENT IN AN ORGANIZED HEALTH CARE EDUCATION/TRAINING PROGRAM

## 2023-04-19 PROCEDURE — 99999 PR PBB SHADOW E&M-EST. PATIENT-LVL II: ICD-10-PCS | Mod: PBBFAC,,, | Performed by: STUDENT IN AN ORGANIZED HEALTH CARE EDUCATION/TRAINING PROGRAM

## 2023-04-19 RX ORDER — CLINDAMYCIN HYDROCHLORIDE 300 MG/1
300 CAPSULE ORAL EVERY 8 HOURS
Qty: 21 CAPSULE | Refills: 0 | Status: SHIPPED | OUTPATIENT
Start: 2023-04-19 | End: 2023-04-27

## 2023-04-26 ENCOUNTER — OFFICE VISIT (OUTPATIENT)
Dept: WOUND CARE | Facility: HOSPITAL | Age: 53
End: 2023-04-26
Attending: SURGERY
Payer: MEDICAID

## 2023-04-26 VITALS
DIASTOLIC BLOOD PRESSURE: 78 MMHG | SYSTOLIC BLOOD PRESSURE: 143 MMHG | RESPIRATION RATE: 19 BRPM | TEMPERATURE: 98 F | HEART RATE: 78 BPM

## 2023-04-26 DIAGNOSIS — L97.514 DIABETIC ULCER OF TOE OF RIGHT FOOT ASSOCIATED WITH TYPE 2 DIABETES MELLITUS, WITH NECROSIS OF BONE: ICD-10-CM

## 2023-04-26 DIAGNOSIS — T81.31XA DISRUPTION OF EXTERNAL SURGICAL WOUND, INITIAL ENCOUNTER: Primary | ICD-10-CM

## 2023-04-26 DIAGNOSIS — E11.621 DIABETIC ULCER OF TOE OF RIGHT FOOT ASSOCIATED WITH TYPE 2 DIABETES MELLITUS, WITH NECROSIS OF BONE: ICD-10-CM

## 2023-04-26 DIAGNOSIS — E11.65 TYPE 2 DIABETES MELLITUS WITH HYPERGLYCEMIA, UNSPECIFIED WHETHER LONG TERM INSULIN USE: ICD-10-CM

## 2023-04-26 DIAGNOSIS — Z12.11 COLON CANCER SCREENING: ICD-10-CM

## 2023-04-26 PROCEDURE — 11045 DBRDMT SUBQ TISS EACH ADDL: CPT

## 2023-04-26 PROCEDURE — 11042 DBRDMT SUBQ TIS 1ST 20SQCM/<: CPT

## 2023-04-27 ENCOUNTER — OFFICE VISIT (OUTPATIENT)
Dept: PRIMARY CARE CLINIC | Facility: CLINIC | Age: 53
End: 2023-04-27
Payer: MEDICAID

## 2023-04-27 VITALS
HEART RATE: 82 BPM | DIASTOLIC BLOOD PRESSURE: 73 MMHG | HEIGHT: 71 IN | SYSTOLIC BLOOD PRESSURE: 133 MMHG | WEIGHT: 238 LBS | TEMPERATURE: 99 F | BODY MASS INDEX: 33.32 KG/M2 | RESPIRATION RATE: 18 BRPM | OXYGEN SATURATION: 94 %

## 2023-04-27 DIAGNOSIS — E11.69 DYSLIPIDEMIA ASSOCIATED WITH TYPE 2 DIABETES MELLITUS: ICD-10-CM

## 2023-04-27 DIAGNOSIS — E11.9 TYPE 2 DIABETES MELLITUS WITHOUT COMPLICATION, UNSPECIFIED WHETHER LONG TERM INSULIN USE: Primary | ICD-10-CM

## 2023-04-27 DIAGNOSIS — D64.9 ANEMIA, UNSPECIFIED TYPE: ICD-10-CM

## 2023-04-27 DIAGNOSIS — E78.5 DYSLIPIDEMIA ASSOCIATED WITH TYPE 2 DIABETES MELLITUS: ICD-10-CM

## 2023-04-27 DIAGNOSIS — E11.65 TYPE 2 DIABETES MELLITUS WITH HYPERGLYCEMIA, UNSPECIFIED WHETHER LONG TERM INSULIN USE: ICD-10-CM

## 2023-04-27 DIAGNOSIS — I10 PRIMARY HYPERTENSION: ICD-10-CM

## 2023-04-27 DIAGNOSIS — E66.9 CLASS 1 OBESITY WITH BODY MASS INDEX (BMI) OF 33.0 TO 33.9 IN ADULT, UNSPECIFIED OBESITY TYPE, UNSPECIFIED WHETHER SERIOUS COMORBIDITY PRESENT: ICD-10-CM

## 2023-04-27 PROCEDURE — 3078F PR MOST RECENT DIASTOLIC BLOOD PRESSURE < 80 MM HG: ICD-10-PCS | Mod: CPTII,,, | Performed by: STUDENT IN AN ORGANIZED HEALTH CARE EDUCATION/TRAINING PROGRAM

## 2023-04-27 PROCEDURE — 99213 PR OFFICE/OUTPT VISIT, EST, LEVL III, 20-29 MIN: ICD-10-PCS | Mod: S$PBB,,, | Performed by: STUDENT IN AN ORGANIZED HEALTH CARE EDUCATION/TRAINING PROGRAM

## 2023-04-27 PROCEDURE — 3075F PR MOST RECENT SYSTOLIC BLOOD PRESS GE 130-139MM HG: ICD-10-PCS | Mod: CPTII,,, | Performed by: STUDENT IN AN ORGANIZED HEALTH CARE EDUCATION/TRAINING PROGRAM

## 2023-04-27 PROCEDURE — 3075F SYST BP GE 130 - 139MM HG: CPT | Mod: CPTII,,, | Performed by: STUDENT IN AN ORGANIZED HEALTH CARE EDUCATION/TRAINING PROGRAM

## 2023-04-27 PROCEDURE — 3008F PR BODY MASS INDEX (BMI) DOCUMENTED: ICD-10-PCS | Mod: CPTII,,, | Performed by: STUDENT IN AN ORGANIZED HEALTH CARE EDUCATION/TRAINING PROGRAM

## 2023-04-27 PROCEDURE — 3046F HEMOGLOBIN A1C LEVEL >9.0%: CPT | Mod: CPTII,,, | Performed by: STUDENT IN AN ORGANIZED HEALTH CARE EDUCATION/TRAINING PROGRAM

## 2023-04-27 PROCEDURE — 4010F PR ACE/ARB THEARPY RXD/TAKEN: ICD-10-PCS | Mod: CPTII,,, | Performed by: STUDENT IN AN ORGANIZED HEALTH CARE EDUCATION/TRAINING PROGRAM

## 2023-04-27 PROCEDURE — 99999 PR PBB SHADOW E&M-EST. PATIENT-LVL IV: ICD-10-PCS | Mod: PBBFAC,,, | Performed by: STUDENT IN AN ORGANIZED HEALTH CARE EDUCATION/TRAINING PROGRAM

## 2023-04-27 PROCEDURE — 3066F NEPHROPATHY DOC TX: CPT | Mod: CPTII,,, | Performed by: STUDENT IN AN ORGANIZED HEALTH CARE EDUCATION/TRAINING PROGRAM

## 2023-04-27 PROCEDURE — 4010F ACE/ARB THERAPY RXD/TAKEN: CPT | Mod: CPTII,,, | Performed by: STUDENT IN AN ORGANIZED HEALTH CARE EDUCATION/TRAINING PROGRAM

## 2023-04-27 PROCEDURE — 3062F PR POS MACROALBUMINURIA RESULT DOCUMENTED/REVIEW: ICD-10-PCS | Mod: CPTII,,, | Performed by: STUDENT IN AN ORGANIZED HEALTH CARE EDUCATION/TRAINING PROGRAM

## 2023-04-27 PROCEDURE — 3046F PR MOST RECENT HEMOGLOBIN A1C LEVEL > 9.0%: ICD-10-PCS | Mod: CPTII,,, | Performed by: STUDENT IN AN ORGANIZED HEALTH CARE EDUCATION/TRAINING PROGRAM

## 2023-04-27 PROCEDURE — 3008F BODY MASS INDEX DOCD: CPT | Mod: CPTII,,, | Performed by: STUDENT IN AN ORGANIZED HEALTH CARE EDUCATION/TRAINING PROGRAM

## 2023-04-27 PROCEDURE — 3066F PR DOCUMENTATION OF TREATMENT FOR NEPHROPATHY: ICD-10-PCS | Mod: CPTII,,, | Performed by: STUDENT IN AN ORGANIZED HEALTH CARE EDUCATION/TRAINING PROGRAM

## 2023-04-27 PROCEDURE — 1111F DSCHRG MED/CURRENT MED MERGE: CPT | Mod: CPTII,,, | Performed by: STUDENT IN AN ORGANIZED HEALTH CARE EDUCATION/TRAINING PROGRAM

## 2023-04-27 PROCEDURE — 3062F POS MACROALBUMINURIA REV: CPT | Mod: CPTII,,, | Performed by: STUDENT IN AN ORGANIZED HEALTH CARE EDUCATION/TRAINING PROGRAM

## 2023-04-27 PROCEDURE — 99999 PR PBB SHADOW E&M-EST. PATIENT-LVL IV: CPT | Mod: PBBFAC,,, | Performed by: STUDENT IN AN ORGANIZED HEALTH CARE EDUCATION/TRAINING PROGRAM

## 2023-04-27 PROCEDURE — 1159F MED LIST DOCD IN RCRD: CPT | Mod: CPTII,,, | Performed by: STUDENT IN AN ORGANIZED HEALTH CARE EDUCATION/TRAINING PROGRAM

## 2023-04-27 PROCEDURE — 3078F DIAST BP <80 MM HG: CPT | Mod: CPTII,,, | Performed by: STUDENT IN AN ORGANIZED HEALTH CARE EDUCATION/TRAINING PROGRAM

## 2023-04-27 PROCEDURE — 99214 OFFICE O/P EST MOD 30 MIN: CPT | Mod: PBBFAC,PN | Performed by: STUDENT IN AN ORGANIZED HEALTH CARE EDUCATION/TRAINING PROGRAM

## 2023-04-27 PROCEDURE — 1159F PR MEDICATION LIST DOCUMENTED IN MEDICAL RECORD: ICD-10-PCS | Mod: CPTII,,, | Performed by: STUDENT IN AN ORGANIZED HEALTH CARE EDUCATION/TRAINING PROGRAM

## 2023-04-27 PROCEDURE — 99213 OFFICE O/P EST LOW 20 MIN: CPT | Mod: S$PBB,,, | Performed by: STUDENT IN AN ORGANIZED HEALTH CARE EDUCATION/TRAINING PROGRAM

## 2023-04-27 PROCEDURE — 1111F PR DISCHARGE MEDS RECONCILED W/ CURRENT OUTPATIENT MED LIST: ICD-10-PCS | Mod: CPTII,,, | Performed by: STUDENT IN AN ORGANIZED HEALTH CARE EDUCATION/TRAINING PROGRAM

## 2023-04-27 RX ORDER — ATORVASTATIN CALCIUM 20 MG/1
20 TABLET, FILM COATED ORAL DAILY
Qty: 90 TABLET | Refills: 3 | Status: SHIPPED | OUTPATIENT
Start: 2023-04-27 | End: 2023-06-29 | Stop reason: SDUPTHER

## 2023-04-27 RX ORDER — NIFEDIPINE 30 MG/1
30 TABLET, EXTENDED RELEASE ORAL DAILY
Qty: 30 TABLET | Refills: 5 | Status: SHIPPED | OUTPATIENT
Start: 2023-04-27 | End: 2023-06-29

## 2023-04-27 RX ORDER — METOPROLOL TARTRATE 25 MG/1
12.5 TABLET, FILM COATED ORAL 2 TIMES DAILY
Qty: 30 TABLET | Refills: 5 | Status: SHIPPED | OUTPATIENT
Start: 2023-04-27 | End: 2023-12-10

## 2023-04-27 RX ORDER — LOSARTAN POTASSIUM 25 MG/1
50 TABLET ORAL 2 TIMES DAILY
Qty: 120 TABLET | Refills: 5 | Status: SHIPPED | OUTPATIENT
Start: 2023-04-27 | End: 2023-06-29 | Stop reason: SDUPTHER

## 2023-04-27 RX ORDER — BLOOD-GLUCOSE METER
EACH MISCELLANEOUS
COMMUNITY
Start: 2023-04-14

## 2023-04-27 RX ORDER — FERROUS SULFATE 325(65) MG
325 TABLET, DELAYED RELEASE (ENTERIC COATED) ORAL DAILY
Qty: 30 TABLET | Refills: 2 | Status: SHIPPED | OUTPATIENT
Start: 2023-04-27 | End: 2023-06-29

## 2023-04-27 RX ORDER — LANCETS 33 GAUGE
EACH MISCELLANEOUS 3 TIMES DAILY
COMMUNITY
Start: 2023-04-14 | End: 2023-04-27

## 2023-04-27 RX ORDER — LANCETS 33 GAUGE
1 EACH MISCELLANEOUS 3 TIMES DAILY
Qty: 100 EACH | Refills: 11 | Status: SHIPPED | OUTPATIENT
Start: 2023-04-27 | End: 2024-04-26

## 2023-04-27 RX ORDER — METFORMIN HYDROCHLORIDE 1000 MG/1
1000 TABLET ORAL 2 TIMES DAILY WITH MEALS
Qty: 60 TABLET | Refills: 2 | Status: SHIPPED | OUTPATIENT
Start: 2023-04-27 | End: 2023-07-24

## 2023-04-28 ENCOUNTER — PATIENT OUTREACH (OUTPATIENT)
Dept: ADMINISTRATIVE | Facility: HOSPITAL | Age: 53
End: 2023-04-28
Payer: MEDICAID

## 2023-04-28 DIAGNOSIS — E11.9 DIABETES MELLITUS WITHOUT COMPLICATION: Primary | ICD-10-CM

## 2023-04-30 PROBLEM — R05.9 COUGH: Status: RESOLVED | Noted: 2023-04-05 | Resolved: 2023-04-30

## 2023-04-30 PROBLEM — E78.2 HYPERCHOLESTEROLEMIA WITH HYPERGLYCERIDEMIA: Status: ACTIVE | Noted: 2023-04-14

## 2023-04-30 PROBLEM — E87.6 HYPOKALEMIA: Status: ACTIVE | Noted: 2023-04-30

## 2023-04-30 PROBLEM — D72.829 LEUKOCYTOSIS: Status: RESOLVED | Noted: 2023-04-05 | Resolved: 2023-04-30

## 2023-04-30 NOTE — ASSESSMENT & PLAN NOTE
Normotensive. Continue with below regimen.   - procardia ER 30 mg daily  - losartan 100 mg  - metoprolol 12.5 mg BID  - avoid processed foods  - low-sodium diet less than 2 g or 2 tsp per day  - f/u 3 months

## 2023-04-30 NOTE — PROGRESS NOTES
"[facility] Note    HPI:  Willam Laurent is a 53 y.o. male who presents today for No chief complaint on file.  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  Right foot wound healing with scheduled visits with wound care (Dr. Cruz).  No longer experiencing dizziness.   Patient notices weight loss after completely removing daily coke.  Has not had any hypoglycemic readings <70. Has noticed range of POC glucose 90-120s.  Patient is ready to return to work.   Denies fever, chills, excessive headaches, vision changes, dizziness, chest pain, palpitations, shortness of breath, abdominal pain, nausea, vomiting, diarrhea, constipation.       ROS   A review of systems was performed and was negative except as noted above.    I personally reviewed allergies, past medical, surgical, social and family history and updated as appropriate.    Medications:    Current Outpatient Medications:     atorvastatin (LIPITOR) 20 MG tablet, Take 1 tablet (20 mg total) by mouth once daily., Disp: 90 tablet, Rfl: 3    blood sugar diagnostic (TRUE METRIX GLUCOSE TEST STRIP) Strp, 1 each by Misc.(Non-Drug; Combo Route) route 4 (four) times daily before meals and nightly., Disp: 100 each, Rfl: 11    ferrous sulfate 325 (65 FE) MG EC tablet, Take 1 tablet (325 mg total) by mouth once daily., Disp: 30 tablet, Rfl: 2    insulin detemir U-100, Levemir, 100 unit/mL (3 mL) SubQ InPn pen, Inject 20 Units into the skin every evening., Disp: 6 mL, Rfl: 11    insulin syringe-needle U-100 (BD INSULIN SYRINGE) 1 mL 25 gauge x 5/8" Syrg, Inject insulin nightly, Disp: 100 each, Rfl: 0    losartan (COZAAR) 25 MG tablet, Take 2 tablets (50 mg total) by mouth 2 (two) times a day., Disp: 120 tablet, Rfl: 5    metFORMIN (GLUCOPHAGE) 1000 MG tablet, Take 1 tablet (1,000 mg total) by mouth 2 (two) " "times daily with meals., Disp: 60 tablet, Rfl: 2    metoprolol tartrate (LOPRESSOR) 25 MG tablet, Take 0.5 tablets (12.5 mg total) by mouth 2 (two) times daily., Disp: 30 tablet, Rfl: 5    NIFEdipine (PROCARDIA-XL) 30 MG (OSM) 24 hr tablet, Take 1 tablet (30 mg total) by mouth once daily., Disp: 30 tablet, Rfl: 5    pen needle, diabetic (BD ULTRA-FINE SHORT PEN NEEDLE) 31 gauge x 5/16" Ndle, Inject insulin nightly, Disp: 100 each, Rfl: 1    TRUE METRIX GLUCOSE METER Misc, USE TO TEST BLOOD SUGAR TWICE DAILY, Disp: , Rfl:     TRUEPLUS LANCETS 33 gauge Misc, 1 lancet by skin prick route 3 (three) times daily. Use to test blood glucose, Disp: 100 each, Rfl: 11     Health Maintenance:    There is no immunization history on file for this patient.   Health Maintenance   Topic Date Due    Hepatitis C Screening  Never done    Foot Exam  Never done    Eye Exam  Never done    TETANUS VACCINE  Never done    Hemoglobin A1c  07/04/2023    Lipid Panel  04/14/2024    Low Dose Statin  04/27/2024     Health Maintenance Topics with due status: Not Due       Topic Last Completion Date    Hemoglobin A1c 04/04/2023    Diabetes Urine Screening 04/14/2023    Lipid Panel 04/14/2023    Low Dose Statin 04/27/2023     Health Maintenance Due   Topic Date Due    Hepatitis C Screening  Never done    Pneumococcal Vaccines (Age 0-64) (1 - PCV) Never done    Foot Exam  Never done    Eye Exam  Never done    HIV Screening  Never done    TETANUS VACCINE  Never done    Colorectal Cancer Screening  Never done    Shingles Vaccine (1 of 2) Never done       PHYSICAL EXAM:  Vitals:    04/19/23 1438   BP: (!) 159/73   BP Location: Left arm   Patient Position: Sitting   BP Method: Large (Automatic)   Pulse: 82   Resp: 20   SpO2: 97%     There is no height or weight on file to calculate BMI.  Physical Exam  Vitals and nursing note reviewed.   Constitutional:       General: He is not in acute distress.     Appearance: Normal appearance. He is not " toxic-appearing.   HENT:      Head: Normocephalic and atraumatic.      Right Ear: External ear normal.      Left Ear: External ear normal.      Nose: Nose normal.      Mouth/Throat:      Mouth: Mucous membranes are moist.      Pharynx: Oropharynx is clear.   Eyes:      Extraocular Movements: Extraocular movements intact.      Conjunctiva/sclera: Conjunctivae normal.   Cardiovascular:      Rate and Rhythm: Normal rate and regular rhythm.      Pulses: Normal pulses.      Heart sounds: Normal heart sounds. No murmur heard.  Pulmonary:      Effort: Pulmonary effort is normal. No respiratory distress.      Breath sounds: Normal breath sounds. No wheezing or rales.   Abdominal:      General: Abdomen is flat. There is no distension.      Palpations: Abdomen is soft. There is no mass.      Tenderness: There is no abdominal tenderness. There is no right CVA tenderness, left CVA tenderness or guarding.   Musculoskeletal:         General: No swelling or tenderness.      Cervical back: Normal range of motion and neck supple. No rigidity.      Right lower leg: No edema.      Left lower leg: No edema.      Comments: Left index finger amputated, well healed scar, Right foot in walking boot, foot wrapped in dressing, clean and dry   Skin:     General: Skin is warm and dry.      Capillary Refill: Capillary refill takes less than 2 seconds.   Neurological:      General: No focal deficit present.      Mental Status: He is alert and oriented to person, place, and time. Mental status is at baseline.      Motor: No weakness.      Gait: Gait normal.   Psychiatric:         Mood and Affect: Mood normal.         Behavior: Behavior normal.         Thought Content: Thought content normal.         Judgment: Judgment normal.    Protective Sensation (w/ 10 gram monofilament):  Right:  right foot s/p amputation, wrapped in dressing, clean and dry, ankle exam normal  Left: Intact    Visual Inspection:  Normal -  Left, Nails Intact - without  Evidence of Foot Deformity- Right , Ulceration -  Right , Blister -  Neither, Skin Breakdown -  Right , Erythema -  Neither, Increased Warmth -  Neither, Callus -  Bilateral, and Dry Skin -  Bilateral    Pedal Pulses:   Right:  wrapped in dressing  Left: Present    Posterior Tibialis Pulses:   Right: wrapped in dressing  Left: Present     ASSESSMENT/PLAN:  1. Hypertension, unspecified type  Assessment & Plan:  Normotensive. Continue with below regimen.   - procardia ER 30 mg daily  - losartan 100 mg  - metoprolol 12.5 mg BID  - avoid processed foods  - low-sodium diet less than 2 g or 2 tsp per day  - f/u 3 months      2. Anemia, unspecified type  Assessment & Plan:  Continue with daily iron supplementation. Incorporate daily fiber to avoid constipation.   Encouraged increase daily hydration and may add daily stool softener. Counseled on food items to incorporate in diet.   - wheat germ, dried fruits, nuts and seeds, whole grain and fortified breads and cereals, dried beans, lentils, and split peas, leafy, dark-green vegetables, eggs  - continue daily iron supplementation, take with small glass of orange juice  - f/u 6-8 weeks with labs (cbc, cmp, iron panel, vit B12, folate, retic)       3. Wound of right foot  Assessment & Plan:  Healing with reduced pain. Dressing over right foot, clean and dry.   - follow up wound care      4. Type 2 diabetes mellitus with hyperglycemia, unspecified whether long term insulin use  Assessment & Plan:  Continue with metformin 1000 mg BID.   Continue with basal insulin.   - for renal protection and cardiovascular health, continue losartan and atorvastatin  - control serum glucose, BP control  - continue log of POC glucose ACHS  - f/u 3 months with A1C with response to current regimen      5. Hypercholesterolemia with hyperglyceridemia  Assessment & Plan:  Continue with daily atorvastatin 20 mg.       6. Hypokalemia  Assessment & Plan:  Denies symptoms. Counseled on food items high  in potassium to incorporate to diet.   - f/u 4 weeks with repeat BMP      7. BMI 34.0-34.9,adult  Overview:  Wt Readings from Last 8 Encounters:   04/27/23 108 kg (238 lb)   04/14/23 110.7 kg (244 lb)   04/04/23 111 kg (244 lb 11.4 oz)   04/04/23 106.6 kg (235 lb)   04/04/23 106.6 kg (235 lb 0.2 oz)           Other than changes above, continue current medications and maintain follow up with specialists.      No follow-ups on file.   Recent Results (from the past 2016 hour(s))   POCT glucose    Collection Time: 04/04/23 10:39 AM   Result Value Ref Range    POCT Glucose 366 (H) 70 - 110 mg/dL   Blood culture x two cultures. Draw prior to antibiotics.    Collection Time: 04/04/23 12:20 PM    Specimen: Peripheral, Antecubital, Right; Blood   Result Value Ref Range    Blood Culture, Routine No growth after 5 days.    CBC auto differential    Collection Time: 04/04/23 12:24 PM   Result Value Ref Range    WBC 19.21 (H) 3.90 - 12.70 K/uL    RBC 4.38 (L) 4.60 - 6.20 M/uL    Hemoglobin 10.8 (L) 14.0 - 18.0 g/dL    Hematocrit 33.6 (L) 40.0 - 54.0 %    MCV 77 (L) 82 - 98 fL    MCH 24.7 (L) 27.0 - 31.0 pg    MCHC 32.1 32.0 - 36.0 g/dL    RDW 13.1 11.5 - 14.5 %    Platelets 454 (H) 150 - 450 K/uL    MPV 8.9 (L) 9.2 - 12.9 fL    Immature Granulocytes 1.0 (H) 0.0 - 0.5 %    Gran # (ANC) 16.3 (H) 1.8 - 7.7 K/uL    Immature Grans (Abs) 0.19 (H) 0.00 - 0.04 K/uL    Lymph # 1.0 1.0 - 4.8 K/uL    Mono # 1.6 (H) 0.3 - 1.0 K/uL    Eos # 0.0 0.0 - 0.5 K/uL    Baso # 0.05 0.00 - 0.20 K/uL    nRBC 0 0 /100 WBC    Gran % 84.9 (H) 38.0 - 73.0 %    Lymph % 5.2 (L) 18.0 - 48.0 %    Mono % 8.5 4.0 - 15.0 %    Eosinophil % 0.1 0.0 - 8.0 %    Basophil % 0.3 0.0 - 1.9 %    Differential Method Automated    Comprehensive metabolic panel    Collection Time: 04/04/23 12:24 PM   Result Value Ref Range    Sodium 131 (L) 136 - 145 mmol/L    Potassium 3.9 3.5 - 5.1 mmol/L    Chloride 95 95 - 110 mmol/L    CO2 27 23 - 29 mmol/L    Glucose 378 (H) 70 - 110  mg/dL    BUN 16 6 - 20 mg/dL    Creatinine 1.3 0.5 - 1.4 mg/dL    Calcium 9.2 8.7 - 10.5 mg/dL    Total Protein 7.4 6.0 - 8.4 g/dL    Albumin 2.3 (L) 3.5 - 5.2 g/dL    Total Bilirubin 0.4 0.1 - 1.0 mg/dL    Alkaline Phosphatase 96 55 - 135 U/L    AST 17 10 - 40 U/L    ALT 19 10 - 44 U/L    Anion Gap 9 8 - 16 mmol/L    eGFR >60.0 >60 mL/min/1.73 m^2   Lactic acid, plasma    Collection Time: 04/04/23 12:24 PM   Result Value Ref Range    Lactate (Lactic Acid) 1.1 0.5 - 2.2 mmol/L   Hemoglobin A1c    Collection Time: 04/04/23 12:24 PM   Result Value Ref Range    Hemoglobin A1C 12.8 (H) 4.0 - 5.6 %    Estimated Avg Glucose 321 (H) 68 - 131 mg/dL   C-reactive protein    Collection Time: 04/04/23 12:24 PM   Result Value Ref Range    .5 (H) 0.0 - 8.2 mg/L   Sedimentation rate    Collection Time: 04/04/23 12:24 PM   Result Value Ref Range    Sed Rate >120 (H) 0 - 23 mm/Hr   Beta - Hydroxybutyrate, Serum    Collection Time: 04/04/23 12:24 PM   Result Value Ref Range    Beta-Hydroxybutyrate 0.8 (H) 0.0 - 0.5 mmol/L   Blood culture x two cultures. Draw prior to antibiotics.    Collection Time: 04/04/23 12:27 PM    Specimen: Peripheral, Wrist, Left; Blood   Result Value Ref Range    Blood Culture, Routine No growth after 5 days.    POCT Venous Blood Gas Once    Collection Time: 04/04/23  2:03 PM   Result Value Ref Range    POC VBG Source Venous     POC COHb 1.7 0 - 3.0 %    POC MetHb 0.4 0 - 1.5 %    POC pH Venous 7.400 7.320 - 7.420    POC pCO2 Venous 52 (H) 41 - 51 mmHg    POC THb 9.6 (L) 12 - 18 g/dL    POC Saturated O2 Venous 46.3 (L) 90 - 100 %    POC pO2 Venous 33 30 - 100 mmHg    POC pO2 Mixed Venous      POC O2Hb Venous 45.3 41 - 85 %    POC O2Hb Mixed Venous      POC TCO2 33.8 mmol/L    POC BE 6.40 (H) -2.00 - 2.00 mmol/L    POC HCO3 Venous 32.20 25.00 - 40.00 mmol/L    Site Vein     DelSys Room Air     Allens Test NA    CBC auto differential    Collection Time: 04/05/23  6:23 AM   Result Value Ref Range    WBC  15.96 (H) 3.90 - 12.70 K/uL    RBC 3.95 (L) 4.60 - 6.20 M/uL    Hemoglobin 9.6 (L) 14.0 - 18.0 g/dL    Hematocrit 31.1 (L) 40.0 - 54.0 %    MCV 79 (L) 82 - 98 fL    MCH 24.3 (L) 27.0 - 31.0 pg    MCHC 30.9 (L) 32.0 - 36.0 g/dL    RDW 13.2 11.5 - 14.5 %    Platelets 420 150 - 450 K/uL    MPV 8.9 (L) 9.2 - 12.9 fL    Immature Granulocytes 1.2 (H) 0.0 - 0.5 %    Gran # (ANC) 12.9 (H) 1.8 - 7.7 K/uL    Immature Grans (Abs) 0.19 (H) 0.00 - 0.04 K/uL    Lymph # 1.3 1.0 - 4.8 K/uL    Mono # 1.5 (H) 0.3 - 1.0 K/uL    Eos # 0.1 0.0 - 0.5 K/uL    Baso # 0.05 0.00 - 0.20 K/uL    nRBC 0 0 /100 WBC    Gran % 80.5 (H) 38.0 - 73.0 %    Lymph % 8.3 (L) 18.0 - 48.0 %    Mono % 9.3 4.0 - 15.0 %    Eosinophil % 0.4 0.0 - 8.0 %    Basophil % 0.3 0.0 - 1.9 %    Differential Method Automated    Comprehensive metabolic panel    Collection Time: 04/05/23  6:23 AM   Result Value Ref Range    Sodium 134 (L) 136 - 145 mmol/L    Potassium 4.2 3.5 - 5.1 mmol/L    Chloride 102 95 - 110 mmol/L    CO2 22 (L) 23 - 29 mmol/L    Glucose 323 (H) 70 - 110 mg/dL    BUN 12 6 - 20 mg/dL    Creatinine 1.1 0.5 - 1.4 mg/dL    Calcium 8.4 (L) 8.7 - 10.5 mg/dL    Total Protein 6.5 6.0 - 8.4 g/dL    Albumin 1.9 (L) 3.5 - 5.2 g/dL    Total Bilirubin 0.3 0.1 - 1.0 mg/dL    Alkaline Phosphatase 68 55 - 135 U/L    AST 11 10 - 40 U/L    ALT 17 10 - 44 U/L    Anion Gap 10 8 - 16 mmol/L    eGFR >60 >60 mL/min/1.73 m^2   POCT glucose    Collection Time: 04/05/23  7:40 AM   Result Value Ref Range    POCT Glucose 294 (H) 70 - 110 mg/dL   POCT glucose    Collection Time: 04/05/23 11:57 AM   Result Value Ref Range    POCT Glucose 229 (H) 70 - 110 mg/dL   Specimen to Pathology, Surgery General Surgery    Collection Time: 04/05/23  3:32 PM   Result Value Ref Range    Final Pathologic Diagnosis       Right foot, 5th toe, amputation:  Ulcerated skin, soft tissue with gangrenous necrosis, and underlying viable bone with osteonecrosis-no acute osteomyelitis identified  Viable  skin margin  Soft tissue margin with gangrenous necrosis  Bony surgical margin with devitalized bone with osteonecrosis-no acute osteomyelitis identified      Gross       Container Label: Surgery MRN: [id number] and Pathology MRN: [id number]  Received in fresh labeled &quot;5th toe of right foot&quot; and consist of 2 amputated 5th tissue fragments measuring 0.2 x 1.8 x 1.5 cm with attached toenail and 2.5 x 2.0 x 1.5 cm showing a 1.0 x 0.7 cm ulcer.  Serial sectioning reveals focal soft   tissue necrosis involving distal phalanx.  Representative sections are submitted in 2 cassettes [id number].  Summary of sections:  1a- resection margin of skin, soft tissue  1b -representative sections of soft tissue necrosis, distal phalangeal bone (decalcification)  Real braxton (4/13/2023)  1c-proximal margin, bone (decalcification)  Real Mooney       Disclaimer       Unless the case is a 'gross only' or additional testing only, the final diagnosis for each specimen is based on a microscopic examination of appropriate tissue sections.   POCT glucose    Collection Time: 04/05/23  4:09 PM   Result Value Ref Range    POCT Glucose 188 (H) 70 - 110 mg/dL   POCT glucose    Collection Time: 04/05/23  7:55 PM   Result Value Ref Range    POCT Glucose 310 (H) 70 - 110 mg/dL   CBC auto differential    Collection Time: 04/06/23  4:03 AM   Result Value Ref Range    WBC 14.61 (H) 3.90 - 12.70 K/uL    RBC 4.11 (L) 4.60 - 6.20 M/uL    Hemoglobin 10.1 (L) 14.0 - 18.0 g/dL    Hematocrit 32.5 (L) 40.0 - 54.0 %    MCV 79 (L) 82 - 98 fL    MCH 24.6 (L) 27.0 - 31.0 pg    MCHC 31.1 (L) 32.0 - 36.0 g/dL    RDW 13.4 11.5 - 14.5 %    Platelets 480 (H) 150 - 450 K/uL    MPV 9.6 9.2 - 12.9 fL    Immature Granulocytes 1.3 (H) 0.0 - 0.5 %    Gran # (ANC) 11.8 (H) 1.8 - 7.7 K/uL    Immature Grans (Abs) 0.19 (H) 0.00 - 0.04 K/uL    Lymph # 1.3 1.0 - 4.8 K/uL    Mono # 1.1 (H) 0.3 - 1.0 K/uL    Eos # 0.2 0.0 - 0.5 K/uL    Baso # 0.06 0.00 - 0.20  K/uL    nRBC 0 0 /100 WBC    Gran % 80.7 (H) 38.0 - 73.0 %    Lymph % 8.8 (L) 18.0 - 48.0 %    Mono % 7.7 4.0 - 15.0 %    Eosinophil % 1.1 0.0 - 8.0 %    Basophil % 0.4 0.0 - 1.9 %    Platelet Estimate Increased (A)     Differential Method Automated    Magnesium    Collection Time: 04/06/23  4:03 AM   Result Value Ref Range    Magnesium 1.5 (L) 1.6 - 2.6 mg/dL   Comprehensive metabolic panel    Collection Time: 04/06/23  4:03 AM   Result Value Ref Range    Sodium 136 136 - 145 mmol/L    Potassium 4.1 3.5 - 5.1 mmol/L    Chloride 104 95 - 110 mmol/L    CO2 24 23 - 29 mmol/L    Glucose 254 (H) 70 - 110 mg/dL    BUN 10 6 - 20 mg/dL    Creatinine 1.2 0.5 - 1.4 mg/dL    Calcium 8.5 (L) 8.7 - 10.5 mg/dL    Total Protein 7.0 6.0 - 8.4 g/dL    Albumin 2.0 (L) 3.5 - 5.2 g/dL    Total Bilirubin 0.3 0.1 - 1.0 mg/dL    Alkaline Phosphatase 76 55 - 135 U/L    AST 13 10 - 40 U/L    ALT 18 10 - 44 U/L    Anion Gap 8 8 - 16 mmol/L    eGFR >60 >60 mL/min/1.73 m^2   Iron and TIBC    Collection Time: 04/06/23  4:03 AM   Result Value Ref Range    Iron 14 (L) 45 - 160 ug/dL    Transferrin 115 (L) 200 - 375 mg/dL    TIBC 170 (L) 250 - 450 ug/dL    Saturated Iron 8 (L) 20 - 50 %   Ferritin    Collection Time: 04/06/23  4:03 AM   Result Value Ref Range    Ferritin 641 (H) 20.0 - 300.0 ng/mL   Vitamin B12    Collection Time: 04/06/23  4:03 AM   Result Value Ref Range    Vitamin B-12 1772 (H) 210 - 950 pg/mL   Folate    Collection Time: 04/06/23  4:03 AM   Result Value Ref Range    Folate 14.4 4.0 - 24.0 ng/mL   Vitamin B1    Collection Time: 04/06/23  4:03 AM   Result Value Ref Range    Thiamine 66 38 - 122 ug/L   VANCOMYCIN, TROUGH    Collection Time: 04/06/23  4:03 AM   Result Value Ref Range    Vancomycin-Trough 15.3 10.0 - 22.0 ug/mL   POCT glucose    Collection Time: 04/06/23  7:12 AM   Result Value Ref Range    POCT Glucose 213 (H) 70 - 110 mg/dL   POCT glucose    Collection Time: 04/06/23 11:11 AM   Result Value Ref Range    POCT  Glucose 222 (H) 70 - 110 mg/dL   Aerobic culture    Collection Time: 04/06/23 11:37 AM    Specimen: Foot, Right; Wound   Result Value Ref Range    Aerobic Bacterial Culture (A)      STREPTOCOCCUS AGALACTIAE (GROUP B)  Few  Beta-hemolytic streptococci are routinely susceptible to   penicillins,cephalosporins and carbapenems.  Susceptibility testing not routinely performed     POCT glucose    Collection Time: 04/06/23  4:40 PM   Result Value Ref Range    POCT Glucose 225 (H) 70 - 110 mg/dL   POCT glucose    Collection Time: 04/06/23  7:46 PM   Result Value Ref Range    POCT Glucose 310 (H) 70 - 110 mg/dL   CBC auto differential    Collection Time: 04/07/23  5:56 AM   Result Value Ref Range    WBC 9.88 3.90 - 12.70 K/uL    RBC 4.27 (L) 4.60 - 6.20 M/uL    Hemoglobin 10.3 (L) 14.0 - 18.0 g/dL    Hematocrit 34.0 (L) 40.0 - 54.0 %    MCV 80 (L) 82 - 98 fL    MCH 24.1 (L) 27.0 - 31.0 pg    MCHC 30.3 (L) 32.0 - 36.0 g/dL    RDW 13.3 11.5 - 14.5 %    Platelets 506 (H) 150 - 450 K/uL    MPV 9.1 (L) 9.2 - 12.9 fL    Immature Granulocytes 1.9 (H) 0.0 - 0.5 %    Gran # (ANC) 6.8 1.8 - 7.7 K/uL    Immature Grans (Abs) 0.19 (H) 0.00 - 0.04 K/uL    Lymph # 1.5 1.0 - 4.8 K/uL    Mono # 1.1 (H) 0.3 - 1.0 K/uL    Eos # 0.3 0.0 - 0.5 K/uL    Baso # 0.08 0.00 - 0.20 K/uL    nRBC 0 0 /100 WBC    Gran % 68.5 38.0 - 73.0 %    Lymph % 15.0 (L) 18.0 - 48.0 %    Mono % 10.9 4.0 - 15.0 %    Eosinophil % 2.9 0.0 - 8.0 %    Basophil % 0.8 0.0 - 1.9 %    Differential Method Automated    Basic metabolic panel    Collection Time: 04/07/23  5:56 AM   Result Value Ref Range    Sodium 139 136 - 145 mmol/L    Potassium 3.9 3.5 - 5.1 mmol/L    Chloride 104 95 - 110 mmol/L    CO2 23 23 - 29 mmol/L    Glucose 217 (H) 70 - 110 mg/dL    BUN 9 6 - 20 mg/dL    Creatinine 1.0 0.5 - 1.4 mg/dL    Calcium 8.9 8.7 - 10.5 mg/dL    Anion Gap 12 8 - 16 mmol/L    eGFR >60 >60 mL/min/1.73 m^2   Magnesium    Collection Time: 04/07/23  5:56 AM   Result Value Ref Range     Magnesium 1.6 1.6 - 2.6 mg/dL   POCT glucose    Collection Time: 04/07/23  7:27 AM   Result Value Ref Range    POCT Glucose 210 (H) 70 - 110 mg/dL   POCT glucose    Collection Time: 04/07/23 11:12 AM   Result Value Ref Range    POCT Glucose 228 (H) 70 - 110 mg/dL   Microalbumin/creatinine urine ratio    Collection Time: 04/14/23 11:03 AM   Result Value Ref Range    Microalbumin, Urine 2000.0 mg/L    Creatinine, Urine 281.0 23.0 - 375.0 mg/dL    Microalb/Creat Ratio 711.7 (H) 0.0 - 30.0 ug/mg   Basic Metabolic Panel    Collection Time: 04/14/23 11:10 AM   Result Value Ref Range    Sodium 141 136 - 145 mmol/L    Potassium 3.2 (L) 3.5 - 5.1 mmol/L    Chloride 103 95 - 110 mmol/L    CO2 31 (H) 23 - 29 mmol/L    Glucose 121 (H) 70 - 110 mg/dL    BUN 13 6 - 20 mg/dL    Creatinine 1.2 0.5 - 1.4 mg/dL    Calcium 8.4 (L) 8.7 - 10.5 mg/dL    Anion Gap 7 (L) 8 - 16 mmol/L    eGFR >60.0 >60 mL/min/1.73 m^2   Lipid Panel    Collection Time: 04/14/23 11:10 AM   Result Value Ref Range    Cholesterol 156 120 - 199 mg/dL    Triglycerides 123 30 - 150 mg/dL    HDL 56 40 - 75 mg/dL    LDL Cholesterol 75.4 63.0 - 159.0 mg/dL    HDL/Cholesterol Ratio 35.9 20.0 - 50.0 %    Total Cholesterol/HDL Ratio 2.8 2.0 - 5.0    Non-HDL Cholesterol 100 mg/dL       DO [facility]

## 2023-04-30 NOTE — ASSESSMENT & PLAN NOTE
Denies symptoms. Counseled on food items high in potassium to incorporate to diet.   - f/u 4 weeks with repeat BMP

## 2023-04-30 NOTE — PROGRESS NOTES
"[facility] Note    HPI:  Willam Laurent is a 53 y.o. male who presents today for Follow-up (Blood pressure check)  Stable, normotensive readings. Patient denies any symptoms.   Dizziness and headaches have all resolved.   Patient feeling well overall.     ROS   A review of systems was performed and was negative except as noted above.    I personally reviewed allergies, past medical, surgical, social and family history and updated as appropriate.    Medications:    Current Outpatient Medications:     insulin detemir U-100, Levemir, 100 unit/mL (3 mL) SubQ InPn pen, Inject 20 Units into the skin every evening., Disp: 6 mL, Rfl: 11    insulin syringe-needle U-100 (BD INSULIN SYRINGE) 1 mL 25 gauge x 5/8" Syrg, Inject insulin nightly, Disp: 100 each, Rfl: 0    pen needle, diabetic (BD ULTRA-FINE SHORT PEN NEEDLE) 31 gauge x 5/16" Ndle, Inject insulin nightly, Disp: 100 each, Rfl: 1    TRUE METRIX GLUCOSE METER Misc, USE TO TEST BLOOD SUGAR TWICE DAILY, Disp: , Rfl:     atorvastatin (LIPITOR) 20 MG tablet, Take 1 tablet (20 mg total) by mouth once daily., Disp: 90 tablet, Rfl: 3    blood sugar diagnostic (TRUE METRIX GLUCOSE TEST STRIP) Strp, 1 each by Misc.(Non-Drug; Combo Route) route 4 (four) times daily before meals and nightly., Disp: 100 each, Rfl: 11    ferrous sulfate 325 (65 FE) MG EC tablet, Take 1 tablet (325 mg total) by mouth once daily., Disp: 30 tablet, Rfl: 2    losartan (COZAAR) 25 MG tablet, Take 2 tablets (50 mg total) by mouth 2 (two) times a day., Disp: 120 tablet, Rfl: 5    metFORMIN (GLUCOPHAGE) 1000 MG tablet, Take 1 tablet (1,000 mg total) by mouth 2 (two) times daily with meals., Disp: 60 tablet, Rfl: 2    metoprolol tartrate (LOPRESSOR) 25 MG tablet, Take 0.5 tablets (12.5 mg total) by mouth 2 (two) times daily., Disp: 30 tablet, Rfl: 5    NIFEdipine (PROCARDIA-XL) 30 MG (OSM) 24 hr tablet, Take 1 tablet (30 mg total) by mouth once daily., Disp: 30 tablet, Rfl: 5    " "TRUEPLUS LANCETS 33 gauge Misc, 1 lancet by skin prick route 3 (three) times daily. Use to test blood glucose, Disp: 100 each, Rfl: 11     Health Maintenance:    There is no immunization history on file for this patient.   Health Maintenance   Topic Date Due    Hepatitis C Screening  Never done    Eye Exam  Never done    TETANUS VACCINE  Never done    Hemoglobin A1c  07/04/2023    Lipid Panel  04/14/2024    Foot Exam  04/19/2024    Low Dose Statin  04/27/2024     Health Maintenance Topics with due status: Not Due       Topic Last Completion Date    Hemoglobin A1c 04/04/2023    Diabetes Urine Screening 04/14/2023    Lipid Panel 04/14/2023    Foot Exam 04/19/2023    Low Dose Statin 04/27/2023     Health Maintenance Due   Topic Date Due    Hepatitis C Screening  Never done    Pneumococcal Vaccines (Age 0-64) (1 - PCV) Never done    Eye Exam  Never done    HIV Screening  Never done    TETANUS VACCINE  Never done    Colorectal Cancer Screening  Never done    Shingles Vaccine (1 of 2) Never done       PHYSICAL EXAM:  Vitals:    04/27/23 1440 04/27/23 1448   BP: (!) 143/77 133/73   BP Location: Left arm    Patient Position: Sitting    BP Method: Large (Automatic)    Pulse: 82    Resp: 18    Temp: 98.6 °F (37 °C)    TempSrc: Oral    SpO2: (!) 94%    Weight: 108 kg (238 lb)    Height: 5' 11" (1.803 m)      Body mass index is 33.19 kg/m².  Physical Exam  Vitals and nursing note reviewed.   Constitutional:       General: He is not in acute distress.     Appearance: Normal appearance. He is not toxic-appearing.   HENT:      Head: Normocephalic and atraumatic.      Right Ear: External ear normal.      Left Ear: External ear normal.      Nose: Nose normal.      Mouth/Throat:      Mouth: Mucous membranes are moist.      Pharynx: Oropharynx is clear.   Eyes:      Extraocular Movements: Extraocular movements intact.      Conjunctiva/sclera: Conjunctivae normal.   Cardiovascular:      Rate and Rhythm: Normal rate and regular rhythm. "      Pulses: Normal pulses.      Heart sounds: Normal heart sounds. No murmur heard.  Pulmonary:      Effort: Pulmonary effort is normal. No respiratory distress.      Breath sounds: Normal breath sounds. No wheezing or rales.   Abdominal:      General: Abdomen is flat. There is no distension.      Palpations: Abdomen is soft. There is no mass.      Tenderness: There is no abdominal tenderness. There is no right CVA tenderness, left CVA tenderness or guarding.   Musculoskeletal:         General: No swelling or tenderness.      Cervical back: Normal range of motion and neck supple. No rigidity.      Right lower leg: No edema.      Left lower leg: No edema.      Comments: Left index finger amputated, well healed scar, Right foot in walking boot, foot wrapped in dressing, clean and dry   Skin:     General: Skin is warm and dry.      Capillary Refill: Capillary refill takes less than 2 seconds.   Neurological:      General: No focal deficit present.      Mental Status: He is alert and oriented to person, place, and time. Mental status is at baseline.      Motor: No weakness.      Gait: Gait normal.   Psychiatric:         Mood and Affect: Mood normal.         Behavior: Behavior normal.         Thought Content: Thought content normal.         Judgment: Judgment normal.        ASSESSMENT/PLAN:  1. Type 2 diabetes mellitus without complication, unspecified whether long term insulin use  Overview:  Diagnosed in 4/2023  Current diabetic medications:  - metformin 1000 mg daily  - basal insulin 20U QHS  Known diabetic complications: poor wound healing s/p 5th metatarsal amputation on 4/7/23  Weight trend: stable  Blood glucose monitoring at home: Not started yet, POC glucose with glucometer QACHS  Statin: Taking  ACE/ARB: Taking     Screening or Prevention Patient's value   HgA1C Testing and Control   Lab Results   Component Value Date    HGBA1C 12.8 (H) 04/04/2023        Lipid profile Most Recent Lipid Panel Health Maintenance  Topic Completion: Not Found   LDL control No results found for: LDLCALC   Nephropathy screening No results found for: MICALBCREAT   Blood pressure BP Readings from Last 1 Encounters:   04/14/23 (!) 186/91      Dilated retinal exam Most Recent Eye Exam Date: Not Found   Foot exam Most Recent Foot Exam Date: Not Found       Orders:  -     TRUEPLUS LANCETS 33 gauge Misc; 1 lancet by skin prick route 3 (three) times daily. Use to test blood glucose  Dispense: 100 each; Refill: 11  -     blood sugar diagnostic (TRUE METRIX GLUCOSE TEST STRIP) Strp; 1 each by Misc.(Non-Drug; Combo Route) route 4 (four) times daily before meals and nightly.  Dispense: 100 each; Refill: 11  -     metFORMIN (GLUCOPHAGE) 1000 MG tablet; Take 1 tablet (1,000 mg total) by mouth 2 (two) times daily with meals.  Dispense: 60 tablet; Refill: 2    2. Class 1 obesity with body mass index (BMI) of 33.0 to 33.9 in adult, unspecified obesity type, unspecified whether serious comorbidity present  Overview:  Wt Readings from Last 8 Encounters:   04/27/23 108 kg (238 lb)   04/14/23 110.7 kg (244 lb)   04/04/23 111 kg (244 lb 11.4 oz)   04/04/23 106.6 kg (235 lb)   04/04/23 106.6 kg (235 lb 0.2 oz)         3. Primary hypertension  Assessment & Plan:  BP elevated. Dose readjustments.  - procardia ER 30 mg daily  - losartan 100 mg  - metoprolol 12.5 mg BID  - f/u 2 weeks for BP check    Orders:  -     NIFEdipine (PROCARDIA-XL) 30 MG (OSM) 24 hr tablet; Take 1 tablet (30 mg total) by mouth once daily.  Dispense: 30 tablet; Refill: 5    4. Dyslipidemia associated with type 2 diabetes mellitus  -     atorvastatin (LIPITOR) 20 MG tablet; Take 1 tablet (20 mg total) by mouth once daily.  Dispense: 90 tablet; Refill: 3    5. Type 2 diabetes mellitus with hyperglycemia, unspecified whether long term insulin use  -     metFORMIN (GLUCOPHAGE) 1000 MG tablet; Take 1 tablet (1,000 mg total) by mouth 2 (two) times daily with meals.  Dispense: 60 tablet; Refill: 2    6.  Anemia, unspecified type  -     ferrous sulfate 325 (65 FE) MG EC tablet; Take 1 tablet (325 mg total) by mouth once daily.  Dispense: 30 tablet; Refill: 2    Other orders  -     losartan (COZAAR) 25 MG tablet; Take 2 tablets (50 mg total) by mouth 2 (two) times a day.  Dispense: 120 tablet; Refill: 5  -     metoprolol tartrate (LOPRESSOR) 25 MG tablet; Take 0.5 tablets (12.5 mg total) by mouth 2 (two) times daily.  Dispense: 30 tablet; Refill: 5        Other than changes above, continue current medications and maintain follow up with specialists.      Follow up in about 2 months (around 6/27/2023) for BP check, Med recheck.   Recent Results (from the past 2016 hour(s))   POCT glucose    Collection Time: 04/04/23 10:39 AM   Result Value Ref Range    POCT Glucose 366 (H) 70 - 110 mg/dL   Blood culture x two cultures. Draw prior to antibiotics.    Collection Time: 04/04/23 12:20 PM    Specimen: Peripheral, Antecubital, Right; Blood   Result Value Ref Range    Blood Culture, Routine No growth after 5 days.    CBC auto differential    Collection Time: 04/04/23 12:24 PM   Result Value Ref Range    WBC 19.21 (H) 3.90 - 12.70 K/uL    RBC 4.38 (L) 4.60 - 6.20 M/uL    Hemoglobin 10.8 (L) 14.0 - 18.0 g/dL    Hematocrit 33.6 (L) 40.0 - 54.0 %    MCV 77 (L) 82 - 98 fL    MCH 24.7 (L) 27.0 - 31.0 pg    MCHC 32.1 32.0 - 36.0 g/dL    RDW 13.1 11.5 - 14.5 %    Platelets 454 (H) 150 - 450 K/uL    MPV 8.9 (L) 9.2 - 12.9 fL    Immature Granulocytes 1.0 (H) 0.0 - 0.5 %    Gran # (ANC) 16.3 (H) 1.8 - 7.7 K/uL    Immature Grans (Abs) 0.19 (H) 0.00 - 0.04 K/uL    Lymph # 1.0 1.0 - 4.8 K/uL    Mono # 1.6 (H) 0.3 - 1.0 K/uL    Eos # 0.0 0.0 - 0.5 K/uL    Baso # 0.05 0.00 - 0.20 K/uL    nRBC 0 0 /100 WBC    Gran % 84.9 (H) 38.0 - 73.0 %    Lymph % 5.2 (L) 18.0 - 48.0 %    Mono % 8.5 4.0 - 15.0 %    Eosinophil % 0.1 0.0 - 8.0 %    Basophil % 0.3 0.0 - 1.9 %    Differential Method Automated    Comprehensive metabolic panel    Collection Time:  04/04/23 12:24 PM   Result Value Ref Range    Sodium 131 (L) 136 - 145 mmol/L    Potassium 3.9 3.5 - 5.1 mmol/L    Chloride 95 95 - 110 mmol/L    CO2 27 23 - 29 mmol/L    Glucose 378 (H) 70 - 110 mg/dL    BUN 16 6 - 20 mg/dL    Creatinine 1.3 0.5 - 1.4 mg/dL    Calcium 9.2 8.7 - 10.5 mg/dL    Total Protein 7.4 6.0 - 8.4 g/dL    Albumin 2.3 (L) 3.5 - 5.2 g/dL    Total Bilirubin 0.4 0.1 - 1.0 mg/dL    Alkaline Phosphatase 96 55 - 135 U/L    AST 17 10 - 40 U/L    ALT 19 10 - 44 U/L    Anion Gap 9 8 - 16 mmol/L    eGFR >60.0 >60 mL/min/1.73 m^2   Lactic acid, plasma    Collection Time: 04/04/23 12:24 PM   Result Value Ref Range    Lactate (Lactic Acid) 1.1 0.5 - 2.2 mmol/L   Hemoglobin A1c    Collection Time: 04/04/23 12:24 PM   Result Value Ref Range    Hemoglobin A1C 12.8 (H) 4.0 - 5.6 %    Estimated Avg Glucose 321 (H) 68 - 131 mg/dL   C-reactive protein    Collection Time: 04/04/23 12:24 PM   Result Value Ref Range    .5 (H) 0.0 - 8.2 mg/L   Sedimentation rate    Collection Time: 04/04/23 12:24 PM   Result Value Ref Range    Sed Rate >120 (H) 0 - 23 mm/Hr   Beta - Hydroxybutyrate, Serum    Collection Time: 04/04/23 12:24 PM   Result Value Ref Range    Beta-Hydroxybutyrate 0.8 (H) 0.0 - 0.5 mmol/L   Blood culture x two cultures. Draw prior to antibiotics.    Collection Time: 04/04/23 12:27 PM    Specimen: Peripheral, Wrist, Left; Blood   Result Value Ref Range    Blood Culture, Routine No growth after 5 days.    POCT Venous Blood Gas Once    Collection Time: 04/04/23  2:03 PM   Result Value Ref Range    POC VBG Source Venous     POC COHb 1.7 0 - 3.0 %    POC MetHb 0.4 0 - 1.5 %    POC pH Venous 7.400 7.320 - 7.420    POC pCO2 Venous 52 (H) 41 - 51 mmHg    POC THb 9.6 (L) 12 - 18 g/dL    POC Saturated O2 Venous 46.3 (L) 90 - 100 %    POC pO2 Venous 33 30 - 100 mmHg    POC pO2 Mixed Venous      POC O2Hb Venous 45.3 41 - 85 %    POC O2Hb Mixed Venous      POC TCO2 33.8 mmol/L    POC BE 6.40 (H) -2.00 - 2.00  mmol/L    POC HCO3 Venous 32.20 25.00 - 40.00 mmol/L    Site Vein     DelSys Room Air     Allens Test NA    CBC auto differential    Collection Time: 04/05/23  6:23 AM   Result Value Ref Range    WBC 15.96 (H) 3.90 - 12.70 K/uL    RBC 3.95 (L) 4.60 - 6.20 M/uL    Hemoglobin 9.6 (L) 14.0 - 18.0 g/dL    Hematocrit 31.1 (L) 40.0 - 54.0 %    MCV 79 (L) 82 - 98 fL    MCH 24.3 (L) 27.0 - 31.0 pg    MCHC 30.9 (L) 32.0 - 36.0 g/dL    RDW 13.2 11.5 - 14.5 %    Platelets 420 150 - 450 K/uL    MPV 8.9 (L) 9.2 - 12.9 fL    Immature Granulocytes 1.2 (H) 0.0 - 0.5 %    Gran # (ANC) 12.9 (H) 1.8 - 7.7 K/uL    Immature Grans (Abs) 0.19 (H) 0.00 - 0.04 K/uL    Lymph # 1.3 1.0 - 4.8 K/uL    Mono # 1.5 (H) 0.3 - 1.0 K/uL    Eos # 0.1 0.0 - 0.5 K/uL    Baso # 0.05 0.00 - 0.20 K/uL    nRBC 0 0 /100 WBC    Gran % 80.5 (H) 38.0 - 73.0 %    Lymph % 8.3 (L) 18.0 - 48.0 %    Mono % 9.3 4.0 - 15.0 %    Eosinophil % 0.4 0.0 - 8.0 %    Basophil % 0.3 0.0 - 1.9 %    Differential Method Automated    Comprehensive metabolic panel    Collection Time: 04/05/23  6:23 AM   Result Value Ref Range    Sodium 134 (L) 136 - 145 mmol/L    Potassium 4.2 3.5 - 5.1 mmol/L    Chloride 102 95 - 110 mmol/L    CO2 22 (L) 23 - 29 mmol/L    Glucose 323 (H) 70 - 110 mg/dL    BUN 12 6 - 20 mg/dL    Creatinine 1.1 0.5 - 1.4 mg/dL    Calcium 8.4 (L) 8.7 - 10.5 mg/dL    Total Protein 6.5 6.0 - 8.4 g/dL    Albumin 1.9 (L) 3.5 - 5.2 g/dL    Total Bilirubin 0.3 0.1 - 1.0 mg/dL    Alkaline Phosphatase 68 55 - 135 U/L    AST 11 10 - 40 U/L    ALT 17 10 - 44 U/L    Anion Gap 10 8 - 16 mmol/L    eGFR >60 >60 mL/min/1.73 m^2   POCT glucose    Collection Time: 04/05/23  7:40 AM   Result Value Ref Range    POCT Glucose 294 (H) 70 - 110 mg/dL   POCT glucose    Collection Time: 04/05/23 11:57 AM   Result Value Ref Range    POCT Glucose 229 (H) 70 - 110 mg/dL   Specimen to Pathology, Surgery General Surgery    Collection Time: 04/05/23  3:32 PM   Result Value Ref Range    Final  Pathologic Diagnosis       Right foot, 5th toe, amputation:  Ulcerated skin, soft tissue with gangrenous necrosis, and underlying viable bone with osteonecrosis-no acute osteomyelitis identified  Viable skin margin  Soft tissue margin with gangrenous necrosis  Bony surgical margin with devitalized bone with osteonecrosis-no acute osteomyelitis identified      Gross       Container Label: Surgery MRN: [id number] and Pathology MRN: [id number]  Received in fresh labeled &quot;5th toe of right foot&quot; and consist of 2 amputated 5th tissue fragments measuring 0.2 x 1.8 x 1.5 cm with attached toenail and 2.5 x 2.0 x 1.5 cm showing a 1.0 x 0.7 cm ulcer.  Serial sectioning reveals focal soft   tissue necrosis involving distal phalanx.  Representative sections are submitted in 2 cassettes [id number].  Summary of sections:  1a- resection margin of skin, soft tissue  1b -representative sections of soft tissue necrosis, distal phalangeal bone (decalcification)  Real López gross (4/13/2023)  1c-proximal margin, bone (decalcification)  Real Mooney       Disclaimer       Unless the case is a 'gross only' or additional testing only, the final diagnosis for each specimen is based on a microscopic examination of appropriate tissue sections.   POCT glucose    Collection Time: 04/05/23  4:09 PM   Result Value Ref Range    POCT Glucose 188 (H) 70 - 110 mg/dL   POCT glucose    Collection Time: 04/05/23  7:55 PM   Result Value Ref Range    POCT Glucose 310 (H) 70 - 110 mg/dL   CBC auto differential    Collection Time: 04/06/23  4:03 AM   Result Value Ref Range    WBC 14.61 (H) 3.90 - 12.70 K/uL    RBC 4.11 (L) 4.60 - 6.20 M/uL    Hemoglobin 10.1 (L) 14.0 - 18.0 g/dL    Hematocrit 32.5 (L) 40.0 - 54.0 %    MCV 79 (L) 82 - 98 fL    MCH 24.6 (L) 27.0 - 31.0 pg    MCHC 31.1 (L) 32.0 - 36.0 g/dL    RDW 13.4 11.5 - 14.5 %    Platelets 480 (H) 150 - 450 K/uL    MPV 9.6 9.2 - 12.9 fL    Immature Granulocytes 1.3 (H) 0.0 - 0.5 %     Gran # (ANC) 11.8 (H) 1.8 - 7.7 K/uL    Immature Grans (Abs) 0.19 (H) 0.00 - 0.04 K/uL    Lymph # 1.3 1.0 - 4.8 K/uL    Mono # 1.1 (H) 0.3 - 1.0 K/uL    Eos # 0.2 0.0 - 0.5 K/uL    Baso # 0.06 0.00 - 0.20 K/uL    nRBC 0 0 /100 WBC    Gran % 80.7 (H) 38.0 - 73.0 %    Lymph % 8.8 (L) 18.0 - 48.0 %    Mono % 7.7 4.0 - 15.0 %    Eosinophil % 1.1 0.0 - 8.0 %    Basophil % 0.4 0.0 - 1.9 %    Platelet Estimate Increased (A)     Differential Method Automated    Magnesium    Collection Time: 04/06/23  4:03 AM   Result Value Ref Range    Magnesium 1.5 (L) 1.6 - 2.6 mg/dL   Comprehensive metabolic panel    Collection Time: 04/06/23  4:03 AM   Result Value Ref Range    Sodium 136 136 - 145 mmol/L    Potassium 4.1 3.5 - 5.1 mmol/L    Chloride 104 95 - 110 mmol/L    CO2 24 23 - 29 mmol/L    Glucose 254 (H) 70 - 110 mg/dL    BUN 10 6 - 20 mg/dL    Creatinine 1.2 0.5 - 1.4 mg/dL    Calcium 8.5 (L) 8.7 - 10.5 mg/dL    Total Protein 7.0 6.0 - 8.4 g/dL    Albumin 2.0 (L) 3.5 - 5.2 g/dL    Total Bilirubin 0.3 0.1 - 1.0 mg/dL    Alkaline Phosphatase 76 55 - 135 U/L    AST 13 10 - 40 U/L    ALT 18 10 - 44 U/L    Anion Gap 8 8 - 16 mmol/L    eGFR >60 >60 mL/min/1.73 m^2   Iron and TIBC    Collection Time: 04/06/23  4:03 AM   Result Value Ref Range    Iron 14 (L) 45 - 160 ug/dL    Transferrin 115 (L) 200 - 375 mg/dL    TIBC 170 (L) 250 - 450 ug/dL    Saturated Iron 8 (L) 20 - 50 %   Ferritin    Collection Time: 04/06/23  4:03 AM   Result Value Ref Range    Ferritin 641 (H) 20.0 - 300.0 ng/mL   Vitamin B12    Collection Time: 04/06/23  4:03 AM   Result Value Ref Range    Vitamin B-12 1772 (H) 210 - 950 pg/mL   Folate    Collection Time: 04/06/23  4:03 AM   Result Value Ref Range    Folate 14.4 4.0 - 24.0 ng/mL   Vitamin B1    Collection Time: 04/06/23  4:03 AM   Result Value Ref Range    Thiamine 66 38 - 122 ug/L   VANCOMYCIN, TROUGH    Collection Time: 04/06/23  4:03 AM   Result Value Ref Range    Vancomycin-Trough 15.3 10.0 - 22.0 ug/mL    POCT glucose    Collection Time: 04/06/23  7:12 AM   Result Value Ref Range    POCT Glucose 213 (H) 70 - 110 mg/dL   POCT glucose    Collection Time: 04/06/23 11:11 AM   Result Value Ref Range    POCT Glucose 222 (H) 70 - 110 mg/dL   Aerobic culture    Collection Time: 04/06/23 11:37 AM    Specimen: Foot, Right; Wound   Result Value Ref Range    Aerobic Bacterial Culture (A)      STREPTOCOCCUS AGALACTIAE (GROUP B)  Few  Beta-hemolytic streptococci are routinely susceptible to   penicillins,cephalosporins and carbapenems.  Susceptibility testing not routinely performed     POCT glucose    Collection Time: 04/06/23  4:40 PM   Result Value Ref Range    POCT Glucose 225 (H) 70 - 110 mg/dL   POCT glucose    Collection Time: 04/06/23  7:46 PM   Result Value Ref Range    POCT Glucose 310 (H) 70 - 110 mg/dL   CBC auto differential    Collection Time: 04/07/23  5:56 AM   Result Value Ref Range    WBC 9.88 3.90 - 12.70 K/uL    RBC 4.27 (L) 4.60 - 6.20 M/uL    Hemoglobin 10.3 (L) 14.0 - 18.0 g/dL    Hematocrit 34.0 (L) 40.0 - 54.0 %    MCV 80 (L) 82 - 98 fL    MCH 24.1 (L) 27.0 - 31.0 pg    MCHC 30.3 (L) 32.0 - 36.0 g/dL    RDW 13.3 11.5 - 14.5 %    Platelets 506 (H) 150 - 450 K/uL    MPV 9.1 (L) 9.2 - 12.9 fL    Immature Granulocytes 1.9 (H) 0.0 - 0.5 %    Gran # (ANC) 6.8 1.8 - 7.7 K/uL    Immature Grans (Abs) 0.19 (H) 0.00 - 0.04 K/uL    Lymph # 1.5 1.0 - 4.8 K/uL    Mono # 1.1 (H) 0.3 - 1.0 K/uL    Eos # 0.3 0.0 - 0.5 K/uL    Baso # 0.08 0.00 - 0.20 K/uL    nRBC 0 0 /100 WBC    Gran % 68.5 38.0 - 73.0 %    Lymph % 15.0 (L) 18.0 - 48.0 %    Mono % 10.9 4.0 - 15.0 %    Eosinophil % 2.9 0.0 - 8.0 %    Basophil % 0.8 0.0 - 1.9 %    Differential Method Automated    Basic metabolic panel    Collection Time: 04/07/23  5:56 AM   Result Value Ref Range    Sodium 139 136 - 145 mmol/L    Potassium 3.9 3.5 - 5.1 mmol/L    Chloride 104 95 - 110 mmol/L    CO2 23 23 - 29 mmol/L    Glucose 217 (H) 70 - 110 mg/dL    BUN 9 6 - 20 mg/dL     Creatinine 1.0 0.5 - 1.4 mg/dL    Calcium 8.9 8.7 - 10.5 mg/dL    Anion Gap 12 8 - 16 mmol/L    eGFR >60 >60 mL/min/1.73 m^2   Magnesium    Collection Time: 04/07/23  5:56 AM   Result Value Ref Range    Magnesium 1.6 1.6 - 2.6 mg/dL   POCT glucose    Collection Time: 04/07/23  7:27 AM   Result Value Ref Range    POCT Glucose 210 (H) 70 - 110 mg/dL   POCT glucose    Collection Time: 04/07/23 11:12 AM   Result Value Ref Range    POCT Glucose 228 (H) 70 - 110 mg/dL   Microalbumin/creatinine urine ratio    Collection Time: 04/14/23 11:03 AM   Result Value Ref Range    Microalbumin, Urine 2000.0 mg/L    Creatinine, Urine 281.0 23.0 - 375.0 mg/dL    Microalb/Creat Ratio 711.7 (H) 0.0 - 30.0 ug/mg   Basic Metabolic Panel    Collection Time: 04/14/23 11:10 AM   Result Value Ref Range    Sodium 141 136 - 145 mmol/L    Potassium 3.2 (L) 3.5 - 5.1 mmol/L    Chloride 103 95 - 110 mmol/L    CO2 31 (H) 23 - 29 mmol/L    Glucose 121 (H) 70 - 110 mg/dL    BUN 13 6 - 20 mg/dL    Creatinine 1.2 0.5 - 1.4 mg/dL    Calcium 8.4 (L) 8.7 - 10.5 mg/dL    Anion Gap 7 (L) 8 - 16 mmol/L    eGFR >60.0 >60 mL/min/1.73 m^2   Lipid Panel    Collection Time: 04/14/23 11:10 AM   Result Value Ref Range    Cholesterol 156 120 - 199 mg/dL    Triglycerides 123 30 - 150 mg/dL    HDL 56 40 - 75 mg/dL    LDL Cholesterol 75.4 63.0 - 159.0 mg/dL    HDL/Cholesterol Ratio 35.9 20.0 - 50.0 %    Total Cholesterol/HDL Ratio 2.8 2.0 - 5.0    Non-HDL Cholesterol 100 mg/dL         Alpa Santana DO  [facility]

## 2023-04-30 NOTE — ASSESSMENT & PLAN NOTE
Continue with metformin 1000 mg BID.   Continue with basal insulin.   - for renal protection and cardiovascular health, continue losartan and atorvastatin  - control serum glucose, BP control  - continue log of POC glucose ACHS  - f/u 3 months with A1C with response to current regimen

## 2023-04-30 NOTE — ASSESSMENT & PLAN NOTE
Continue with daily iron supplementation. Incorporate daily fiber to avoid constipation.   Encouraged increase daily hydration and may add daily stool softener. Counseled on food items to incorporate in diet.   - wheat germ, dried fruits, nuts and seeds, whole grain and fortified breads and cereals, dried beans, lentils, and split peas, leafy, dark-green vegetables, eggs  - continue daily iron supplementation, take with small glass of orange juice  - f/u 6-8 weeks with labs (cbc, cmp, iron panel, vit B12, folate, retic)

## 2023-05-02 ENCOUNTER — OFFICE VISIT (OUTPATIENT)
Dept: WOUND CARE | Facility: HOSPITAL | Age: 53
End: 2023-05-02
Attending: SURGERY
Payer: MEDICAID

## 2023-05-02 VITALS
TEMPERATURE: 98 F | SYSTOLIC BLOOD PRESSURE: 130 MMHG | HEART RATE: 77 BPM | DIASTOLIC BLOOD PRESSURE: 69 MMHG | RESPIRATION RATE: 17 BRPM

## 2023-05-02 DIAGNOSIS — T81.31XA DISRUPTION OF EXTERNAL SURGICAL WOUND, INITIAL ENCOUNTER: Primary | ICD-10-CM

## 2023-05-02 DIAGNOSIS — L97.514 DIABETIC ULCER OF TOE OF RIGHT FOOT ASSOCIATED WITH TYPE 2 DIABETES MELLITUS, WITH NECROSIS OF BONE: ICD-10-CM

## 2023-05-02 DIAGNOSIS — E11.621 DIABETIC ULCER OF TOE OF RIGHT FOOT ASSOCIATED WITH TYPE 2 DIABETES MELLITUS, WITH NECROSIS OF BONE: ICD-10-CM

## 2023-05-02 PROCEDURE — 11045 DBRDMT SUBQ TISS EACH ADDL: CPT

## 2023-05-02 PROCEDURE — 11042 PR DEBRIDEMENT, SKIN, SUB-Q TISSUE,=<20 SQ CM: ICD-10-PCS | Mod: ,,, | Performed by: STUDENT IN AN ORGANIZED HEALTH CARE EDUCATION/TRAINING PROGRAM

## 2023-05-02 PROCEDURE — 11042 DBRDMT SUBQ TIS 1ST 20SQCM/<: CPT

## 2023-05-02 PROCEDURE — 99499 UNLISTED E&M SERVICE: CPT | Mod: ,,, | Performed by: STUDENT IN AN ORGANIZED HEALTH CARE EDUCATION/TRAINING PROGRAM

## 2023-05-02 PROCEDURE — 11045 PR DEB SUBQ TISSUE ADD-ON: ICD-10-PCS | Mod: ,,, | Performed by: STUDENT IN AN ORGANIZED HEALTH CARE EDUCATION/TRAINING PROGRAM

## 2023-05-02 PROCEDURE — 11045 DBRDMT SUBQ TISS EACH ADDL: CPT | Mod: ,,, | Performed by: STUDENT IN AN ORGANIZED HEALTH CARE EDUCATION/TRAINING PROGRAM

## 2023-05-02 PROCEDURE — 11042 DBRDMT SUBQ TIS 1ST 20SQCM/<: CPT | Mod: ,,, | Performed by: STUDENT IN AN ORGANIZED HEALTH CARE EDUCATION/TRAINING PROGRAM

## 2023-05-02 PROCEDURE — 99499 NO LOS: ICD-10-PCS | Mod: ,,, | Performed by: STUDENT IN AN ORGANIZED HEALTH CARE EDUCATION/TRAINING PROGRAM

## 2023-05-03 ENCOUNTER — PATIENT MESSAGE (OUTPATIENT)
Dept: ADMINISTRATIVE | Facility: HOSPITAL | Age: 53
End: 2023-05-03
Payer: MEDICAID

## 2023-05-10 ENCOUNTER — OFFICE VISIT (OUTPATIENT)
Dept: WOUND CARE | Facility: HOSPITAL | Age: 53
End: 2023-05-10
Attending: SURGERY
Payer: MEDICAID

## 2023-05-10 VITALS
HEART RATE: 88 BPM | SYSTOLIC BLOOD PRESSURE: 124 MMHG | TEMPERATURE: 98 F | RESPIRATION RATE: 19 BRPM | DIASTOLIC BLOOD PRESSURE: 75 MMHG

## 2023-05-10 DIAGNOSIS — T81.31XA DISRUPTION OF EXTERNAL SURGICAL WOUND, INITIAL ENCOUNTER: Primary | ICD-10-CM

## 2023-05-10 DIAGNOSIS — L97.514 DIABETIC ULCER OF TOE OF RIGHT FOOT ASSOCIATED WITH TYPE 2 DIABETES MELLITUS, WITH NECROSIS OF BONE: ICD-10-CM

## 2023-05-10 DIAGNOSIS — E11.65 TYPE 2 DIABETES MELLITUS WITH HYPERGLYCEMIA, UNSPECIFIED WHETHER LONG TERM INSULIN USE: ICD-10-CM

## 2023-05-10 DIAGNOSIS — E11.621 DIABETIC ULCER OF TOE OF RIGHT FOOT ASSOCIATED WITH TYPE 2 DIABETES MELLITUS, WITH NECROSIS OF BONE: ICD-10-CM

## 2023-05-10 PROCEDURE — 11045 DBRDMT SUBQ TISS EACH ADDL: CPT

## 2023-05-10 PROCEDURE — 11042 DBRDMT SUBQ TIS 1ST 20SQCM/<: CPT

## 2023-05-11 DIAGNOSIS — E11.9 TYPE 2 DIABETES MELLITUS WITHOUT COMPLICATION, UNSPECIFIED WHETHER LONG TERM INSULIN USE: ICD-10-CM

## 2023-05-11 NOTE — TELEPHONE ENCOUNTER
Refill Routing Note   Medication(s) are not appropriate for processing by [facility] for the following reason(s):      Non-Participating Provider    [facility] action(s):  Route Care Due:  None Identified          Appointments  past 12m or future 3m with PCP    Date Provider   Last Visit   4/27/2023 Alpa Santana, DO   Next Visit   6/29/2023 Alpa Santana, DO   ED visits in past 90 days: 1        Note composed:1:02 PM 05/11/2023

## 2023-05-17 ENCOUNTER — OFFICE VISIT (OUTPATIENT)
Dept: WOUND CARE | Facility: HOSPITAL | Age: 53
End: 2023-05-17
Attending: SURGERY
Payer: MEDICAID

## 2023-05-17 VITALS
TEMPERATURE: 97 F | RESPIRATION RATE: 18 BRPM | SYSTOLIC BLOOD PRESSURE: 134 MMHG | HEART RATE: 67 BPM | DIASTOLIC BLOOD PRESSURE: 62 MMHG

## 2023-05-17 DIAGNOSIS — T81.31XA DISRUPTION OF EXTERNAL SURGICAL WOUND, INITIAL ENCOUNTER: Primary | ICD-10-CM

## 2023-05-17 DIAGNOSIS — E11.621 DIABETIC ULCER OF TOE OF RIGHT FOOT ASSOCIATED WITH TYPE 2 DIABETES MELLITUS, WITH NECROSIS OF BONE: ICD-10-CM

## 2023-05-17 DIAGNOSIS — L97.514 DIABETIC ULCER OF TOE OF RIGHT FOOT ASSOCIATED WITH TYPE 2 DIABETES MELLITUS, WITH NECROSIS OF BONE: ICD-10-CM

## 2023-05-17 PROCEDURE — 11042 DBRDMT SUBQ TIS 1ST 20SQCM/<: CPT

## 2023-05-19 PROBLEM — L97.514 DIABETIC ULCER OF TOE OF RIGHT FOOT ASSOCIATED WITH TYPE 2 DIABETES MELLITUS, WITH NECROSIS OF BONE: Status: ACTIVE | Noted: 2023-05-19

## 2023-05-19 PROBLEM — E11.621 DIABETIC ULCER OF TOE OF RIGHT FOOT ASSOCIATED WITH TYPE 2 DIABETES MELLITUS, WITH NECROSIS OF BONE: Status: ACTIVE | Noted: 2023-05-19

## 2023-05-19 PROBLEM — T81.31XA DISRUPTION OF EXTERNAL SURGICAL WOUND: Status: ACTIVE | Noted: 2023-05-19

## 2023-05-20 NOTE — PROGRESS NOTES
[facility]  Wound Care  Progress Note        HPI:  53-year-old male who presents with 53-year-old male who presents with diabetic ulcer of the right foot.  Compliant with daily wound care.  Voices no complaints.      Review of Systems:  12 point ROS negative save for those outlined in HPI          Vitals:    05/02/23 1235   BP: 130/69   Pulse: 77   Resp: 17   Temp: 98.3 °F (36.8 °C)           Physical Exam:  Gen: AAOx3; NAD  CV: RRR  Resp: breaths equal and nonlabored  Abd: Soft, NT, ND;  Ext: no c/c/e  Wound: 5.5 x 9.9 x 0.8cm wound to right plantar foot with viable granulation tissue          Problem List Items Addressed This Visit          Endocrine    Diabetic ulcer of toe of right foot associated with type 2 diabetes mellitus, with necrosis of bone    Overview     S/p amputation with open wound to amputation site. HgbA1c 12.8.  Needs better control for healing. Current wound measures 5.5x10.6x1.2 cm to right lateral foot at transmetatarsal amputation of 5th digit.  Debrided with curette.  Dressed with Dakin's moistened gauze.  Santyl ordered.  Daily changes.  FU next week              Orthopedic    Disruption of external surgical wound - Primary    Overview     See ulcer description                Plan:  See Wound Docs note for plan and follow up.  Wound debrided in clinic  Continue silver dressing daily  RTC Wednesday next week       Susiemerlyn Sullivan  [facility]

## 2023-05-20 NOTE — PROGRESS NOTES
[facility]  Wound Care  History and Physical    Problem List Items Addressed This Visit          Endocrine    Diabetic ulcer of toe of right foot associated with type 2 diabetes mellitus, with necrosis of bone    Overview     S/p amputation with open wound to amputation site. HgbA1c 12.8.  Needs better control for healing. Current wound measures 5.5x10.6x1.2 cm to right lateral foot at transmetatarsal amputation of 5th digit.  Debrided with curette.  Dressed with Dakin's moistened gauze.  Santyl ordered.  Daily changes.  FU next week              Orthopedic    Disruption of external surgical wound - Primary    Overview     See ulcer description                History:    Past Medical History:   Diagnosis Date    Anemia     Cough 4/5/2023    Diabetes mellitus, type 2     Hypertension     Leukocytosis 4/5/2023       Past Surgical History:   Procedure Laterality Date    IRRIGATION AND DEBRIDEMENT Right 04/05/2023    Procedure: IRRIGATION AND DEBRIDEMENT OF NECROTIC SOFT TISSUE, TENDONS, FAT, AND MUSCLE (FOOT);  Surgeon: Nehemias Buckley MD;  Location: [facility] OR;  Service: General;  Laterality: Right;    left finger amuptation      TOE AMPUTATION Right 04/05/2023    Procedure: AMPUTATION, FIFTH TOE AND PARTIAL METATARSAL AMPUTATION DISTALLY;  Surgeon: Nehemias Buckley MD;  Location: [facility] OR;  Service: General;  Laterality: Right;       Family History   Problem Relation Age of Onset    Heart disease Mother     COPD Mother     Diabetes Mother         reports that he has never smoked. He has never been exposed to tobacco smoke. He has never used smokeless tobacco. He reports that he does not currently use alcohol. He reports that he does not use drugs.    has a current medication list which includes the following prescription(s): insulin detemir u-100 (levemir), atorvastatin, blood sugar diagnostic, ferrous sulfate, bd insulin syringe, losartan, metformin, metoprolol tartrate, nifedipine, pen needle,  diabetic, true metrix glucose meter, and trueplus lancets.    Allergies:  Patient has no known allergies.    Review of Systems:  Review of Systems   Musculoskeletal:  Positive for myalgias.   All other systems reviewed and are negative.      Vitals:    04/12/23 1225   BP: (!) 160/87   Pulse: 91   Resp: 18   Temp: 98.5 °F (36.9 °C)         BMI:  There is no height or weight on file to calculate BMI.    Physical Exam:  Physical Exam  Vitals and nursing note reviewed.   Constitutional:       General: He is not in acute distress.     Appearance: He is obese. He is not ill-appearing.   Cardiovascular:      Rate and Rhythm: Normal rate and regular rhythm.      Heart sounds: Normal heart sounds. No murmur heard.    No gallop.   Pulmonary:      Effort: Pulmonary effort is normal. No respiratory distress.      Breath sounds: Normal breath sounds. No wheezing, rhonchi or rales.   Abdominal:      General: Bowel sounds are normal.      Palpations: Abdomen is soft.      Tenderness: There is no abdominal tenderness. There is no guarding or rebound.   Musculoskeletal:         General: Swelling present. No deformity.   Skin:     Comments: See wound docs   Neurological:      Mental Status: He is alert and oriented to person, place, and time. Mental status is at baseline.      Motor: No weakness.      Gait: Gait abnormal.   Psychiatric:         Mood and Affect: Mood normal.         Behavior: Behavior normal.         Thought Content: Thought content normal.         Judgment: Judgment normal.       A1C:  Recent Labs   Lab Result Units 04/04/23  1224   Hemoglobin A1C % 12.8*     BMP:  Recent Labs   Lab Result Units 04/06/23  0403 04/07/23  0556 04/14/23  1110   Glucose mg/dL 254* 217* 121*   Sodium mmol/L 136 139 141   Potassium mmol/L 4.1 3.9 3.2*   Chloride mmol/L 104 104 103   CO2 mmol/L 24 23 31*   BUN mg/dL 10 9 13   Creatinine mg/dL 1.2 1.0 1.2   Calcium mg/dL 8.5* 8.9 8.4*   Magnesium mg/dL 1.5* 1.6  --       CBC:  Recent Labs    Lab Result Units 04/05/23  0623 04/06/23  0403 04/07/23  0556   WBC K/uL 15.96* 14.61* 9.88   RBC M/uL 3.95* 4.11* 4.27*   Hemoglobin g/dL 9.6* 10.1* 10.3*   Hematocrit % 31.1* 32.5* 34.0*   Platelets K/uL 420 480* 506*   MCV fL 79* 79* 80*   MCH pg 24.3* 24.6* 24.1*   MCHC g/dL 30.9* 31.1* 30.3*     CMP:  Recent Labs   Lab Result Units 04/04/23  1224 04/05/23  0623 04/06/23  0403 04/07/23  0556 04/14/23  1110   Glucose mg/dL 378* 323* 254*   < > 121*   Calcium mg/dL 9.2 8.4* 8.5*   < > 8.4*   Albumin g/dL 2.3* 1.9* 2.0*  --   --    Total Protein g/dL 7.4 6.5 7.0  --   --    Sodium mmol/L 131* 134* 136   < > 141   Potassium mmol/L 3.9 4.2 4.1   < > 3.2*   CO2 mmol/L 27 22* 24   < > 31*   Chloride mmol/L 95 102 104   < > 103   BUN mg/dL 16 12 10   < > 13   Alkaline Phosphatase U/L 96 68 76  --   --    ALT U/L 19 17 18  --   --    AST U/L 17 11 13  --   --    Total Bilirubin mg/dL 0.4 0.3 0.3  --   --     < > = values in this interval not displayed.     PREALBUMIN:  No results for input(s): PREALBUMIN in the last 2160 hours.  WOUND CULTURES:  Recent Labs   Lab Result Units 04/06/23  1137   Aerobic Bacterial Culture  STREPTOCOCCUS AGALACTIAE (GROUP B)  Few  Beta-hemolytic streptococci are routinely susceptible to   penicillins,cephalosporins and carbapenems.  Susceptibility testing not routinely performed  *           Plan:  See Wound Docs note for plan and follow up.        Kori Cruz  [facility]

## 2023-05-24 ENCOUNTER — OFFICE VISIT (OUTPATIENT)
Dept: WOUND CARE | Facility: HOSPITAL | Age: 53
End: 2023-05-24
Attending: SURGERY
Payer: MEDICAID

## 2023-05-24 VITALS
RESPIRATION RATE: 19 BRPM | SYSTOLIC BLOOD PRESSURE: 154 MMHG | DIASTOLIC BLOOD PRESSURE: 89 MMHG | HEART RATE: 71 BPM | TEMPERATURE: 98 F

## 2023-05-24 DIAGNOSIS — E11.621 DIABETIC ULCER OF TOE OF RIGHT FOOT ASSOCIATED WITH TYPE 2 DIABETES MELLITUS, WITH NECROSIS OF BONE: ICD-10-CM

## 2023-05-24 DIAGNOSIS — L97.514 DIABETIC ULCER OF TOE OF RIGHT FOOT ASSOCIATED WITH TYPE 2 DIABETES MELLITUS, WITH NECROSIS OF BONE: ICD-10-CM

## 2023-05-24 DIAGNOSIS — T81.31XA DISRUPTION OF EXTERNAL SURGICAL WOUND, INITIAL ENCOUNTER: Primary | ICD-10-CM

## 2023-05-24 PROCEDURE — 11042 DBRDMT SUBQ TIS 1ST 20SQCM/<: CPT

## 2023-05-24 PROCEDURE — 11045 DBRDMT SUBQ TISS EACH ADDL: CPT

## 2023-05-24 NOTE — PROGRESS NOTES
[facility]  Wound Care  Progress Note        Problem List Items Addressed This Visit          Endocrine    Diabetic ulcer of toe of right foot associated with type 2 diabetes mellitus, with necrosis of bone    Overview     S/p amputation with open wound to amputation site. HgbA1c 12.8.  Needs better control for healing. Current wound measures 5.5x10.5x1.1 cm to right lateral foot at transmetatarsal amputation of 5th digit.  Debrided with curette.  Dressed with Dakin's moistened gauze.  Santyl ordered.  Daily changes.  FU next week              Orthopedic    Disruption of external surgical wound - Primary    Overview     See ulcer description          See wound doc progress notes. Documents will be scanned.        Kori Cruz  [facility]

## 2023-05-30 NOTE — PROGRESS NOTES
[facility]  Wound Care  Progress Note        Problem List Items Addressed This Visit          Endocrine    Type 2 diabetes mellitus with hyperglycemia    Overview     Elevated HgbA1c and needs repeat.         Diabetic ulcer of toe of right foot associated with type 2 diabetes mellitus, with necrosis of bone    Overview     S/p amputation with open wound to amputation site. HgbA1c 12.8.  Needs better control for healing. Current wound measures 4.5x9.9x1.1 cm to right lateral foot at transmetatarsal amputation of 5th digit and wraps around side of foot.  Debrided with curette.  Apply Santyl then alginate.  Daily changes.  FU next week            Orthopedic    Disruption of external surgical wound - Primary    Overview     See ulcer description        See wound doc progress notes. Documents will be scanned.        Kori Cruz  [facility]

## 2023-05-31 ENCOUNTER — OFFICE VISIT (OUTPATIENT)
Dept: WOUND CARE | Facility: HOSPITAL | Age: 53
End: 2023-05-31
Attending: SURGERY
Payer: MEDICAID

## 2023-05-31 VITALS
TEMPERATURE: 99 F | DIASTOLIC BLOOD PRESSURE: 65 MMHG | SYSTOLIC BLOOD PRESSURE: 124 MMHG | HEART RATE: 78 BPM | RESPIRATION RATE: 18 BRPM

## 2023-05-31 DIAGNOSIS — T81.31XA DISRUPTION OF EXTERNAL SURGICAL WOUND, INITIAL ENCOUNTER: Primary | ICD-10-CM

## 2023-05-31 DIAGNOSIS — L97.514 DIABETIC ULCER OF TOE OF RIGHT FOOT ASSOCIATED WITH TYPE 2 DIABETES MELLITUS, WITH NECROSIS OF BONE: ICD-10-CM

## 2023-05-31 DIAGNOSIS — E11.621 DIABETIC ULCER OF TOE OF RIGHT FOOT ASSOCIATED WITH TYPE 2 DIABETES MELLITUS, WITH NECROSIS OF BONE: ICD-10-CM

## 2023-05-31 DIAGNOSIS — E11.65 TYPE 2 DIABETES MELLITUS WITH HYPERGLYCEMIA, UNSPECIFIED WHETHER LONG TERM INSULIN USE: ICD-10-CM

## 2023-05-31 PROCEDURE — 11042 DBRDMT SUBQ TIS 1ST 20SQCM/<: CPT

## 2023-05-31 NOTE — PROGRESS NOTES
[facility]  Wound Care  Progress Note        Problem List Items Addressed This Visit          Endocrine    Type 2 diabetes mellitus with hyperglycemia    Overview     Elevated HgbA1c and needs repeat.         Diabetic ulcer of toe of right foot associated with type 2 diabetes mellitus, with necrosis of bone    Overview     S/p amputation with open wound to amputation site. HgbA1c 12.8.  Needs better control for healing. Current wound measures 5x9.9x0.7 cm to right lateral foot at transmetatarsal amputation of 5th digit and wraps around side of foot.  Debrided with curette.  Apply Santyl then alginate.  Daily changes.  FU next week            Orthopedic    Disruption of external surgical wound - Primary    Overview     See ulcer description        See wound doc progress notes. Documents will be scanned.        Kori Cruz  [facility]

## 2023-06-01 NOTE — PROGRESS NOTES
[facility]  Wound Care  Progress Note        Problem List Items Addressed This Visit          Endocrine    Diabetic ulcer of toe of right foot associated with type 2 diabetes mellitus, with necrosis of bone    Overview     S/p amputation with open wound to amputation site. HgbA1c 12.8.  Needs better control for healing. Current wound measures 5.7x2.8x0.3 cm to right lateral foot at transmetatarsal amputation of 5th digit and wraps around side of foot.  Debrided with curette.  Apply Santyl then alginate.  Daily changes.  FU next week            Orthopedic    Disruption of external surgical wound - Primary    Overview     See ulcer description        See wound doc progress notes. Documents will be scanned.        Kori Cruz  [facility]

## 2023-06-07 ENCOUNTER — OFFICE VISIT (OUTPATIENT)
Dept: WOUND CARE | Facility: HOSPITAL | Age: 53
End: 2023-06-07
Attending: SURGERY
Payer: MEDICAID

## 2023-06-07 VITALS
HEART RATE: 78 BPM | DIASTOLIC BLOOD PRESSURE: 69 MMHG | RESPIRATION RATE: 18 BRPM | SYSTOLIC BLOOD PRESSURE: 129 MMHG | TEMPERATURE: 98 F

## 2023-06-07 DIAGNOSIS — T81.31XA DISRUPTION OF EXTERNAL SURGICAL WOUND, INITIAL ENCOUNTER: ICD-10-CM

## 2023-06-07 DIAGNOSIS — E11.65 TYPE 2 DIABETES MELLITUS WITH HYPERGLYCEMIA, UNSPECIFIED WHETHER LONG TERM INSULIN USE: ICD-10-CM

## 2023-06-07 DIAGNOSIS — E11.621 DIABETIC ULCER OF TOE OF RIGHT FOOT ASSOCIATED WITH TYPE 2 DIABETES MELLITUS, WITH NECROSIS OF BONE: Primary | ICD-10-CM

## 2023-06-07 DIAGNOSIS — L97.514 DIABETIC ULCER OF TOE OF RIGHT FOOT ASSOCIATED WITH TYPE 2 DIABETES MELLITUS, WITH NECROSIS OF BONE: Primary | ICD-10-CM

## 2023-06-07 PROCEDURE — 11042 DBRDMT SUBQ TIS 1ST 20SQCM/<: CPT

## 2023-06-07 RX ORDER — CEPHALEXIN 500 MG/1
500 CAPSULE ORAL EVERY 6 HOURS
Qty: 28 CAPSULE | Refills: 0 | Status: SHIPPED | OUTPATIENT
Start: 2023-06-07 | End: 2023-06-29

## 2023-06-09 NOTE — PROGRESS NOTES
[facility]  Wound Care  Progress Note        Problem List Items Addressed This Visit          Endocrine    Diabetic ulcer of toe of right foot associated with type 2 diabetes mellitus, with necrosis of bone    Overview     S/p amputation with open wound to amputation site. HgbA1c 12.8.  Needs better control for healing. Current wound measures 6.5x4x0.3 cm to right lateral foot at transmetatarsal amputation of 5th digit and wraps around side of foot.  Slightly larger with excess granulation.   Debrided with curette.  Silver nitrate to edges with hypergranulation.  Apply Santyl then alginate.  Daily changes.  FU next week            Orthopedic    Disruption of external surgical wound - Primary    Overview     See ulcer description        See wound doc progress notes. Documents will be scanned.        Kori Cruz  [facility]

## 2023-06-09 NOTE — PROGRESS NOTES
[facility]  Wound Care  Progress Note        Problem List Items Addressed This Visit          Endocrine    Type 2 diabetes mellitus with hyperglycemia    Overview     Elevated HgbA1c and needs repeat.         Diabetic ulcer of toe of right foot associated with type 2 diabetes mellitus, with necrosis of bone    Overview     S/p amputation with open wound to amputation site. HgbA1c 12.8.  Needs better control for healing. Current wound measures 5.5x2.4x0.3 cm to right lateral foot at transmetatarsal amputation of 5th digit and wraps around side of foot.  Slightly larger with excess granulation.   Debrided with curette.    Apply alginate.  Daily changes.  FU next week            Orthopedic    Disruption of external surgical wound - Primary    Overview     See ulcer description        See wound doc progress notes. Documents will be scanned.        Kori Cruz  [facility]

## 2023-06-14 ENCOUNTER — OFFICE VISIT (OUTPATIENT)
Dept: WOUND CARE | Facility: HOSPITAL | Age: 53
End: 2023-06-14
Attending: SURGERY
Payer: MEDICAID

## 2023-06-14 VITALS
DIASTOLIC BLOOD PRESSURE: 64 MMHG | SYSTOLIC BLOOD PRESSURE: 123 MMHG | RESPIRATION RATE: 18 BRPM | HEART RATE: 66 BPM | TEMPERATURE: 98 F

## 2023-06-14 DIAGNOSIS — T81.31XA DISRUPTION OF EXTERNAL SURGICAL WOUND, INITIAL ENCOUNTER: ICD-10-CM

## 2023-06-14 DIAGNOSIS — E11.65 TYPE 2 DIABETES MELLITUS WITH HYPERGLYCEMIA, UNSPECIFIED WHETHER LONG TERM INSULIN USE: ICD-10-CM

## 2023-06-14 DIAGNOSIS — E11.621 DIABETIC ULCER OF TOE OF RIGHT FOOT ASSOCIATED WITH TYPE 2 DIABETES MELLITUS, WITH NECROSIS OF BONE: Primary | ICD-10-CM

## 2023-06-14 DIAGNOSIS — L97.514 DIABETIC ULCER OF TOE OF RIGHT FOOT ASSOCIATED WITH TYPE 2 DIABETES MELLITUS, WITH NECROSIS OF BONE: Primary | ICD-10-CM

## 2023-06-14 PROCEDURE — 11042 DBRDMT SUBQ TIS 1ST 20SQCM/<: CPT

## 2023-06-14 RX ORDER — GABAPENTIN 100 MG/1
100 CAPSULE ORAL EVERY 8 HOURS PRN
Qty: 90 CAPSULE | Refills: 11 | Status: SHIPPED | OUTPATIENT
Start: 2023-06-14 | End: 2024-06-13

## 2023-06-21 ENCOUNTER — OFFICE VISIT (OUTPATIENT)
Dept: WOUND CARE | Facility: HOSPITAL | Age: 53
End: 2023-06-21
Attending: SURGERY
Payer: MEDICAID

## 2023-06-21 VITALS
SYSTOLIC BLOOD PRESSURE: 132 MMHG | DIASTOLIC BLOOD PRESSURE: 73 MMHG | RESPIRATION RATE: 18 BRPM | TEMPERATURE: 98 F | HEART RATE: 78 BPM

## 2023-06-21 DIAGNOSIS — E11.65 TYPE 2 DIABETES MELLITUS WITH HYPERGLYCEMIA, UNSPECIFIED WHETHER LONG TERM INSULIN USE: ICD-10-CM

## 2023-06-21 DIAGNOSIS — T81.31XA DISRUPTION OF EXTERNAL SURGICAL WOUND, INITIAL ENCOUNTER: ICD-10-CM

## 2023-06-21 DIAGNOSIS — L97.514 DIABETIC ULCER OF TOE OF RIGHT FOOT ASSOCIATED WITH TYPE 2 DIABETES MELLITUS, WITH NECROSIS OF BONE: Primary | ICD-10-CM

## 2023-06-21 DIAGNOSIS — E11.621 DIABETIC ULCER OF TOE OF RIGHT FOOT ASSOCIATED WITH TYPE 2 DIABETES MELLITUS, WITH NECROSIS OF BONE: Primary | ICD-10-CM

## 2023-06-21 PROCEDURE — 11042 DBRDMT SUBQ TIS 1ST 20SQCM/<: CPT

## 2023-06-26 NOTE — PROGRESS NOTES
[facility]  Wound Care  Progress Note        Problem List Items Addressed This Visit          Endocrine    Type 2 diabetes mellitus with hyperglycemia    Overview     Elevated HgbA1c and needs repeat.         Diabetic ulcer of toe of right foot associated with type 2 diabetes mellitus, with necrosis of bone - Primary    Overview     S/p amputation with open wound to amputation site. HgbA1c 12.8 last check.  Recent order from April was not drawn.  Needs better control for healing. Current wound measures 5.5x2.5x0.4 cm to right lateral foot at transmetatarsal amputation of 5th digit and wraps around side of foot.  Slightly larger with excess granulation.   Debrided with curette.  Silver nitrate applied to excess granulation.    Apply alginate.  Daily changes.  FU next week.  Start antibiotics for slight erythema and swelling onto dorsum of foot.                Orthopedic    Disruption of external surgical wound    Overview     See ulcer description        See wound doc progress notes. Documents will be scanned.        Kori Cruz  [facility]

## 2023-06-28 ENCOUNTER — OFFICE VISIT (OUTPATIENT)
Dept: WOUND CARE | Facility: HOSPITAL | Age: 53
End: 2023-06-28
Attending: SURGERY
Payer: MEDICAID

## 2023-06-28 VITALS
HEART RATE: 77 BPM | RESPIRATION RATE: 18 BRPM | DIASTOLIC BLOOD PRESSURE: 70 MMHG | TEMPERATURE: 98 F | SYSTOLIC BLOOD PRESSURE: 134 MMHG

## 2023-06-28 DIAGNOSIS — L97.514 DIABETIC ULCER OF TOE OF RIGHT FOOT ASSOCIATED WITH TYPE 2 DIABETES MELLITUS, WITH NECROSIS OF BONE: Primary | ICD-10-CM

## 2023-06-28 DIAGNOSIS — E11.621 DIABETIC ULCER OF TOE OF RIGHT FOOT ASSOCIATED WITH TYPE 2 DIABETES MELLITUS, WITH NECROSIS OF BONE: Primary | ICD-10-CM

## 2023-06-28 DIAGNOSIS — T81.31XA DISRUPTION OF EXTERNAL SURGICAL WOUND, INITIAL ENCOUNTER: ICD-10-CM

## 2023-06-28 DIAGNOSIS — E11.65 TYPE 2 DIABETES MELLITUS WITH HYPERGLYCEMIA, UNSPECIFIED WHETHER LONG TERM INSULIN USE: ICD-10-CM

## 2023-06-28 PROCEDURE — 11042 DBRDMT SUBQ TIS 1ST 20SQCM/<: CPT

## 2023-06-29 ENCOUNTER — OFFICE VISIT (OUTPATIENT)
Dept: PRIMARY CARE CLINIC | Facility: CLINIC | Age: 53
End: 2023-06-29
Payer: MEDICAID

## 2023-06-29 VITALS
SYSTOLIC BLOOD PRESSURE: 148 MMHG | RESPIRATION RATE: 18 BRPM | HEART RATE: 78 BPM | HEIGHT: 71 IN | BODY MASS INDEX: 31.92 KG/M2 | WEIGHT: 228 LBS | OXYGEN SATURATION: 96 % | DIASTOLIC BLOOD PRESSURE: 82 MMHG | TEMPERATURE: 99 F

## 2023-06-29 DIAGNOSIS — E78.2 HYPERCHOLESTEROLEMIA WITH HYPERGLYCERIDEMIA: ICD-10-CM

## 2023-06-29 DIAGNOSIS — L97.514 DIABETIC ULCER OF TOE OF RIGHT FOOT ASSOCIATED WITH TYPE 2 DIABETES MELLITUS, WITH NECROSIS OF BONE: ICD-10-CM

## 2023-06-29 DIAGNOSIS — E66.9 CLASS 1 OBESITY WITH SERIOUS COMORBIDITY AND BODY MASS INDEX (BMI) OF 31.0 TO 31.9 IN ADULT, UNSPECIFIED OBESITY TYPE: ICD-10-CM

## 2023-06-29 DIAGNOSIS — E78.5 DYSLIPIDEMIA ASSOCIATED WITH TYPE 2 DIABETES MELLITUS: ICD-10-CM

## 2023-06-29 DIAGNOSIS — E11.621 DIABETIC ULCER OF TOE OF RIGHT FOOT ASSOCIATED WITH TYPE 2 DIABETES MELLITUS, WITH NECROSIS OF BONE: ICD-10-CM

## 2023-06-29 DIAGNOSIS — E11.9 TYPE 2 DIABETES MELLITUS WITHOUT COMPLICATION, UNSPECIFIED WHETHER LONG TERM INSULIN USE: ICD-10-CM

## 2023-06-29 DIAGNOSIS — E11.69 DYSLIPIDEMIA ASSOCIATED WITH TYPE 2 DIABETES MELLITUS: ICD-10-CM

## 2023-06-29 DIAGNOSIS — Z53.20 COLON CANCER SCREENING DECLINED: ICD-10-CM

## 2023-06-29 DIAGNOSIS — I10 PRIMARY HYPERTENSION: Primary | ICD-10-CM

## 2023-06-29 DIAGNOSIS — E11.65 TYPE 2 DIABETES MELLITUS WITH HYPERGLYCEMIA, WITHOUT LONG-TERM CURRENT USE OF INSULIN: ICD-10-CM

## 2023-06-29 DIAGNOSIS — D64.9 ANEMIA, UNSPECIFIED TYPE: ICD-10-CM

## 2023-06-29 PROBLEM — E66.811 CLASS 1 OBESITY WITH SERIOUS COMORBIDITY AND BODY MASS INDEX (BMI) OF 31.0 TO 31.9 IN ADULT: Status: ACTIVE | Noted: 2023-04-14

## 2023-06-29 PROCEDURE — 3062F POS MACROALBUMINURIA REV: CPT | Mod: CPTII,,, | Performed by: STUDENT IN AN ORGANIZED HEALTH CARE EDUCATION/TRAINING PROGRAM

## 2023-06-29 PROCEDURE — 3008F PR BODY MASS INDEX (BMI) DOCUMENTED: ICD-10-PCS | Mod: CPTII,,, | Performed by: STUDENT IN AN ORGANIZED HEALTH CARE EDUCATION/TRAINING PROGRAM

## 2023-06-29 PROCEDURE — 99214 OFFICE O/P EST MOD 30 MIN: CPT | Mod: S$PBB,,, | Performed by: STUDENT IN AN ORGANIZED HEALTH CARE EDUCATION/TRAINING PROGRAM

## 2023-06-29 PROCEDURE — 4010F PR ACE/ARB THEARPY RXD/TAKEN: ICD-10-PCS | Mod: CPTII,,, | Performed by: STUDENT IN AN ORGANIZED HEALTH CARE EDUCATION/TRAINING PROGRAM

## 2023-06-29 PROCEDURE — 99999 PR PBB SHADOW E&M-EST. PATIENT-LVL IV: ICD-10-PCS | Mod: PBBFAC,,, | Performed by: STUDENT IN AN ORGANIZED HEALTH CARE EDUCATION/TRAINING PROGRAM

## 2023-06-29 PROCEDURE — 3079F PR MOST RECENT DIASTOLIC BLOOD PRESSURE 80-89 MM HG: ICD-10-PCS | Mod: CPTII,,, | Performed by: STUDENT IN AN ORGANIZED HEALTH CARE EDUCATION/TRAINING PROGRAM

## 2023-06-29 PROCEDURE — 3066F PR DOCUMENTATION OF TREATMENT FOR NEPHROPATHY: ICD-10-PCS | Mod: CPTII,,, | Performed by: STUDENT IN AN ORGANIZED HEALTH CARE EDUCATION/TRAINING PROGRAM

## 2023-06-29 PROCEDURE — 4010F ACE/ARB THERAPY RXD/TAKEN: CPT | Mod: CPTII,,, | Performed by: STUDENT IN AN ORGANIZED HEALTH CARE EDUCATION/TRAINING PROGRAM

## 2023-06-29 PROCEDURE — 3077F SYST BP >= 140 MM HG: CPT | Mod: CPTII,,, | Performed by: STUDENT IN AN ORGANIZED HEALTH CARE EDUCATION/TRAINING PROGRAM

## 2023-06-29 PROCEDURE — 3008F BODY MASS INDEX DOCD: CPT | Mod: CPTII,,, | Performed by: STUDENT IN AN ORGANIZED HEALTH CARE EDUCATION/TRAINING PROGRAM

## 2023-06-29 PROCEDURE — 3077F PR MOST RECENT SYSTOLIC BLOOD PRESSURE >= 140 MM HG: ICD-10-PCS | Mod: CPTII,,, | Performed by: STUDENT IN AN ORGANIZED HEALTH CARE EDUCATION/TRAINING PROGRAM

## 2023-06-29 PROCEDURE — 1159F PR MEDICATION LIST DOCUMENTED IN MEDICAL RECORD: ICD-10-PCS | Mod: CPTII,,, | Performed by: STUDENT IN AN ORGANIZED HEALTH CARE EDUCATION/TRAINING PROGRAM

## 2023-06-29 PROCEDURE — 3046F HEMOGLOBIN A1C LEVEL >9.0%: CPT | Mod: CPTII,,, | Performed by: STUDENT IN AN ORGANIZED HEALTH CARE EDUCATION/TRAINING PROGRAM

## 2023-06-29 PROCEDURE — 99999 PR PBB SHADOW E&M-EST. PATIENT-LVL IV: CPT | Mod: PBBFAC,,, | Performed by: STUDENT IN AN ORGANIZED HEALTH CARE EDUCATION/TRAINING PROGRAM

## 2023-06-29 PROCEDURE — 3046F PR MOST RECENT HEMOGLOBIN A1C LEVEL > 9.0%: ICD-10-PCS | Mod: CPTII,,, | Performed by: STUDENT IN AN ORGANIZED HEALTH CARE EDUCATION/TRAINING PROGRAM

## 2023-06-29 PROCEDURE — 3066F NEPHROPATHY DOC TX: CPT | Mod: CPTII,,, | Performed by: STUDENT IN AN ORGANIZED HEALTH CARE EDUCATION/TRAINING PROGRAM

## 2023-06-29 PROCEDURE — 1159F MED LIST DOCD IN RCRD: CPT | Mod: CPTII,,, | Performed by: STUDENT IN AN ORGANIZED HEALTH CARE EDUCATION/TRAINING PROGRAM

## 2023-06-29 PROCEDURE — 3062F PR POS MACROALBUMINURIA RESULT DOCUMENTED/REVIEW: ICD-10-PCS | Mod: CPTII,,, | Performed by: STUDENT IN AN ORGANIZED HEALTH CARE EDUCATION/TRAINING PROGRAM

## 2023-06-29 PROCEDURE — 99214 PR OFFICE/OUTPT VISIT, EST, LEVL IV, 30-39 MIN: ICD-10-PCS | Mod: S$PBB,,, | Performed by: STUDENT IN AN ORGANIZED HEALTH CARE EDUCATION/TRAINING PROGRAM

## 2023-06-29 PROCEDURE — 3079F DIAST BP 80-89 MM HG: CPT | Mod: CPTII,,, | Performed by: STUDENT IN AN ORGANIZED HEALTH CARE EDUCATION/TRAINING PROGRAM

## 2023-06-29 PROCEDURE — 99214 OFFICE O/P EST MOD 30 MIN: CPT | Mod: PBBFAC,PN | Performed by: STUDENT IN AN ORGANIZED HEALTH CARE EDUCATION/TRAINING PROGRAM

## 2023-06-29 RX ORDER — LOSARTAN POTASSIUM 100 MG/1
100 TABLET ORAL DAILY
Qty: 30 TABLET | Refills: 2 | Status: SHIPPED | OUTPATIENT
Start: 2023-06-29 | End: 2023-11-06

## 2023-06-29 RX ORDER — NIFEDIPINE 60 MG/1
60 TABLET, EXTENDED RELEASE ORAL DAILY
Qty: 30 TABLET | Refills: 2 | Status: SHIPPED | OUTPATIENT
Start: 2023-06-29 | End: 2023-11-06

## 2023-06-29 RX ORDER — ATORVASTATIN CALCIUM 20 MG/1
20 TABLET, FILM COATED ORAL DAILY
Qty: 90 TABLET | Refills: 3 | Status: SHIPPED | OUTPATIENT
Start: 2023-06-29 | End: 2024-06-28

## 2023-06-29 RX ORDER — FERROUS SULFATE 325(65) MG
325 TABLET, DELAYED RELEASE (ENTERIC COATED) ORAL DAILY
Qty: 30 TABLET | Refills: 2
Start: 2023-06-29 | End: 2023-09-27

## 2023-06-29 NOTE — ASSESSMENT & PLAN NOTE
BP elevated. Dose readjustments.  - increase procardia ER 60 mg from 30 mg daily  - continue losartan 100 mg  - continue metoprolol 12.5 mg BID  - f/u 4 weeks

## 2023-06-29 NOTE — ASSESSMENT & PLAN NOTE
Patient declines colonoscopy study at this time. Denies changes in bowel habits, stool consistency and color. He has become more regular with increased vegetable intake in his diet.

## 2023-06-29 NOTE — ASSESSMENT & PLAN NOTE
Continue with daily iron supplementation. Incorporate daily fiber to avoid constipation.   Encouraged increase daily hydration and may add daily stool softener. Counseled on food items to incorporate in diet.   - wheat germ, dried fruits, nuts and seeds, whole grain and fortified breads and cereals, dried beans, lentils, and split peas, leafy, dark-green vegetables, eggs  - continue daily iron supplementation, take with small glass of orange juice  - f/u 4-6 weeks with labs (cbc, cmp, iron panel, vit B12, folate, retic)

## 2023-06-29 NOTE — ASSESSMENT & PLAN NOTE
6/29 per patient, following Dr. Cruz. Wound edges are closing, requiring stripping overgrown granulation tissue, but otherwise, patient denies increase in pain, swelling, or reduced range of motion in right foot  - continue daily wound care and follow up with wound specialist

## 2023-06-29 NOTE — PROGRESS NOTES
"[facility] Note    HPI:  Willam Laurent is a 53 y.o. male who presents today for Follow-up (2 month follow up)  53 year old male with hypertension, diabetes mellitus type 2, hyperlipidemia, iron deficiency anemia, diabetic foot ulcer s/p amputation of the toe in right foot presents for follow up.   Overall patient has been feeling well, back to working full time without restrictions of continued pain.   Patient still requires eye exam appointment with optometry.   POC glucose at home fasting AM running 82-94, post pradial 140-150s and at bedtime <140.   Currently tolerating basal insulin and metformin.   Denies fever, chills, excessive headaches, vision changes, chest pain, palpitations, shortness of breath, abdominal pain, nausea, vomiting, diarrhea, constipation.     ROS   A review of systems was performed and was negative except as noted above.    I personally reviewed allergies, past medical, surgical, social and family history and updated as appropriate.    Medications:    Current Outpatient Medications:     blood sugar diagnostic (TRUE METRIX GLUCOSE TEST STRIP) Strp, 1 strip by skin prick route 3 (three) times daily., Disp: 100 strip, Rfl: 11    gabapentin (NEURONTIN) 100 MG capsule, Take 1 capsule (100 mg total) by mouth every 8 (eight) hours as needed (pain)., Disp: 90 capsule, Rfl: 11    insulin detemir U-100, Levemir, 100 unit/mL (3 mL) SubQ InPn pen, Inject 20 Units into the skin every evening., Disp: 6 mL, Rfl: 11    insulin syringe-needle U-100 (BD INSULIN SYRINGE) 1 mL 25 gauge x 5/8" Syrg, Inject insulin nightly, Disp: 100 each, Rfl: 0    metFORMIN (GLUCOPHAGE) 1000 MG tablet, Take 1 tablet (1,000 mg total) by mouth 2 (two) times daily with meals., Disp: 60 tablet, Rfl: 2    metoprolol tartrate (LOPRESSOR) 25 MG tablet, Take 0.5 tablets (12.5 mg total) by mouth 2 (two) times daily., Disp: 30 tablet, Rfl: 5    pen needle, diabetic (BD ULTRA-FINE SHORT PEN NEEDLE) 31 gauge x 5/16" " "Ndle, Inject insulin nightly, Disp: 100 each, Rfl: 1    TRUE METRIX GLUCOSE METER Misc, USE TO TEST BLOOD SUGAR TWICE DAILY, Disp: , Rfl:     TRUEPLUS LANCETS 33 gauge Misc, 1 lancet by skin prick route 3 (three) times daily. Use to test blood glucose, Disp: 100 each, Rfl: 11    atorvastatin (LIPITOR) 20 MG tablet, Take 1 tablet (20 mg total) by mouth once daily., Disp: 90 tablet, Rfl: 3    ferrous sulfate 325 (65 FE) MG EC tablet, Take 1 tablet (325 mg total) by mouth once daily., Disp: 30 tablet, Rfl: 2    losartan (COZAAR) 100 MG tablet, Take 1 tablet (100 mg total) by mouth once daily., Disp: 30 tablet, Rfl: 2    NIFEdipine (PROCARDIA-XL) 60 MG (OSM) 24 hr tablet, Take 1 tablet (60 mg total) by mouth once daily., Disp: 30 tablet, Rfl: 2     Health Maintenance:    There is no immunization history on file for this patient.   Health Maintenance   Topic Date Due    Hepatitis C Screening  Never done    Eye Exam  Never done    TETANUS VACCINE  Never done    Hemoglobin A1c  07/04/2023    Lipid Panel  04/14/2024    Foot Exam  04/19/2024    Low Dose Statin  06/29/2024     Health Maintenance Topics with due status: Not Due       Topic Last Completion Date    Diabetes Urine Screening 04/14/2023    Lipid Panel 04/14/2023    Foot Exam 04/19/2023    Low Dose Statin 06/29/2023    Influenza Vaccine Not Due     Health Maintenance Due   Topic Date Due    Hepatitis C Screening  Never done    Pneumococcal Vaccines (Age 0-64) (1 - PCV) Never done    Eye Exam  Never done    HIV Screening  Never done    TETANUS VACCINE  Never done    Colorectal Cancer Screening  Never done    Shingles Vaccine (1 of 2) Never done    Hemoglobin A1c  07/04/2023     PHYSICAL EXAM:  Vitals:    06/29/23 1520 06/29/23 1531   BP: (!) 165/78 (!) 148/82   BP Location: Left arm    Patient Position: Sitting    BP Method: Large (Automatic)    Pulse: 78    Resp: 18    Temp: 98.6 °F (37 °C)    TempSrc: Oral    SpO2: 96%    Weight: 103.4 kg (228 lb)    Height: 5' 11" " (1.803 m)      Body mass index is 31.8 kg/m².  Physical Exam  Vitals and nursing note reviewed.   Constitutional:       General: He is not in acute distress.     Appearance: Normal appearance. He is not toxic-appearing.   HENT:      Head: Normocephalic and atraumatic.      Right Ear: External ear normal.      Left Ear: External ear normal.      Nose: Nose normal.      Mouth/Throat:      Mouth: Mucous membranes are moist.      Pharynx: Oropharynx is clear.   Eyes:      Extraocular Movements: Extraocular movements intact.      Conjunctiva/sclera: Conjunctivae normal.   Cardiovascular:      Rate and Rhythm: Normal rate and regular rhythm.      Pulses: Normal pulses.      Heart sounds: Normal heart sounds. No murmur heard.  Pulmonary:      Effort: Pulmonary effort is normal. No respiratory distress.      Breath sounds: Normal breath sounds. No wheezing or rales.   Abdominal:      General: Abdomen is flat. There is no distension.      Palpations: Abdomen is soft. There is no mass.      Tenderness: There is no abdominal tenderness. There is no right CVA tenderness, left CVA tenderness or guarding.   Musculoskeletal:         General: No swelling or tenderness.      Cervical back: Normal range of motion and neck supple. No rigidity.      Right lower leg: No edema.      Left lower leg: No edema.      Comments: Left index finger amputated, well healed scar, Right foot in walking boot, foot wrapped in dressing, clean and dry   Skin:     General: Skin is warm and dry.      Capillary Refill: Capillary refill takes less than 2 seconds.   Neurological:      General: No focal deficit present.      Mental Status: He is alert and oriented to person, place, and time. Mental status is at baseline.      Motor: No weakness.      Gait: Gait normal.   Psychiatric:         Mood and Affect: Mood normal.         Behavior: Behavior normal.         Thought Content: Thought content normal.         Judgment: Judgment normal.         ASSESSMENT/PLAN:  1. Primary hypertension  Assessment & Plan:  BP elevated. Dose readjustments.  - increase procardia ER 60 mg from 30 mg daily  - continue losartan 100 mg  - continue metoprolol 12.5 mg BID  - f/u 4 weeks     Orders:  -     losartan (COZAAR) 100 MG tablet; Take 1 tablet (100 mg total) by mouth once daily.  Dispense: 30 tablet; Refill: 2  -     Hypertension Digital Medicine (HDMP) Enrollment Order    2. Dyslipidemia associated with type 2 diabetes mellitus  -     atorvastatin (LIPITOR) 20 MG tablet; Take 1 tablet (20 mg total) by mouth once daily.  Dispense: 90 tablet; Refill: 3  -     Lipids Digital Medicine (LDMP) Enrollment Order    3. Hypercholesterolemia with hyperglyceridemia  Assessment & Plan:  Continue with daily atorvastatin 20 mg.       4. Class 1 obesity with serious comorbidity and body mass index (BMI) of 31.0 to 31.9 in adult, unspecified obesity type  Overview:  Wt Readings from Last 8 Encounters:   06/29/23 103.4 kg (228 lb)   04/27/23 108 kg (238 lb)   04/14/23 110.7 kg (244 lb)   04/04/23 111 kg (244 lb 11.4 oz)   04/04/23 106.6 kg (235 lb)   04/04/23 106.6 kg (235 lb 0.2 oz)       Assessment & Plan:  Recommendations:   Stay physically active. As tolerated alternate resistance training with stretching and cardio. Goal of 150 minutes per week of moderate intensity activity or 7,500 - 10,000 steps per day. Follow the Mediterranean Diet. Include whole fresh fruits, vegetables, olive oil, seeds, nuts, whole grains, cold water fish, salmon, mackerel and lean cuts of meat.  Do not drink sugary/diet carbonated beverages. Decrease portion sizes slightly which will result in an approximately 500-calorie deficit. Avoid fast or fried and processed food, especially canned foods. Avoid refined carbohydrates, white starchy foods, flour, white potato, bread, muffins, and cakes. Consider substituting one meal a day with a meal replacement such as Slim fast, lean cuisine, or weight watcher's.  Follow a healthy diet that includes enough calcium, vitamin D and proteins for bone health.        5. Anemia, unspecified type  Assessment & Plan:  Continue with daily iron supplementation. Incorporate daily fiber to avoid constipation.   Encouraged increase daily hydration and may add daily stool softener. Counseled on food items to incorporate in diet.   - wheat germ, dried fruits, nuts and seeds, whole grain and fortified breads and cereals, dried beans, lentils, and split peas, leafy, dark-green vegetables, eggs  - continue daily iron supplementation, take with small glass of orange juice  - f/u 4-6 weeks with labs (cbc, cmp, iron panel, vit B12, folate, retic)     Orders:  -     ferrous sulfate 325 (65 FE) MG EC tablet; Take 1 tablet (325 mg total) by mouth once daily.  Dispense: 30 tablet; Refill: 2    6. Type 2 diabetes mellitus without complication, unspecified whether long term insulin use  Overview:  Diagnosed in 4/2023  Current diabetic medications:  - metformin 1000 mg daily  - basal insulin 20U QHS  Known diabetic complications: poor wound healing s/p 5th metatarsal amputation on 4/7/23  Weight trend: stable  Blood glucose monitoring at home: Not started yet, POC glucose with glucometer QACHS  Statin: Taking  ACE/ARB: Taking     Screening or Prevention Patient's value   HgA1C Testing and Control   Lab Results   Component Value Date    HGBA1C 12.8 (H) 04/04/2023        Lipid profile Most Recent Lipid Panel Health Maintenance Topic Completion: Not Found   LDL control No results found for: LDLCALC   Nephropathy screening No results found for: MICALBCREAT   Blood pressure BP Readings from Last 1 Encounters:   04/14/23 (!) 186/91      Dilated retinal exam Most Recent Eye Exam Date: Not Found   Foot exam Most Recent Foot Exam Date: Not Found         7. Type 2 diabetes mellitus with hyperglycemia, without long-term current use of insulin  Overview:  Elevated HgbA1c and needs repeat.    Orders:  -     Diabetes  Digital Medicine (DD) Enrollment Order    8. Colon cancer screening declined  Assessment & Plan:  Patient declines colonoscopy study at this time. Denies changes in bowel habits, stool consistency and color. He has become more regular with increased vegetable intake in his diet.         9. Diabetic ulcer of toe of right foot associated with type 2 diabetes mellitus, with necrosis of bone  Overview:  S/p amputation with open wound to amputation site. HgbA1c 12.8 last check.  Recent order from April was not drawn.  Needs better control for healing. Current wound measures 4.8x3.4x0.3 cm to right lateral foot at transmetatarsal amputation of 5th digit and wraps around side of foot.  Slightly smaller but still with excess granulation especially around edges.   Debrided with curette.  Silver nitrate applied to excess granulation.    Apply alginate.  Daily changes.  FU next week.  Started antibiotics last visit for slight erythema and swelling onto dorsum of foot and improved.        Assessment & Plan:  6/29 per patient, following Dr. Cruz. Wound edges are closing, requiring stripping overgrown granulation tissue, but otherwise, patient denies increase in pain, swelling, or reduced range of motion in right foot  - continue daily wound care and follow up with wound specialist      Other orders  -     NIFEdipine (PROCARDIA-XL) 60 MG (OSM) 24 hr tablet; Take 1 tablet (60 mg total) by mouth once daily.  Dispense: 30 tablet; Refill: 2        Other than changes above, continue current medications and maintain follow up with specialists.      Follow up in about 3 months (around 9/29/2023).   Recent Results (from the past 2016 hour(s))   POCT glucose    Collection Time: 04/06/23  4:40 PM   Result Value Ref Range    POCT Glucose 225 (H) 70 - 110 mg/dL   POCT glucose    Collection Time: 04/06/23  7:46 PM   Result Value Ref Range    POCT Glucose 310 (H) 70 - 110 mg/dL   CBC auto differential    Collection Time: 04/07/23  5:56  AM   Result Value Ref Range    WBC 9.88 3.90 - 12.70 K/uL    RBC 4.27 (L) 4.60 - 6.20 M/uL    Hemoglobin 10.3 (L) 14.0 - 18.0 g/dL    Hematocrit 34.0 (L) 40.0 - 54.0 %    MCV 80 (L) 82 - 98 fL    MCH 24.1 (L) 27.0 - 31.0 pg    MCHC 30.3 (L) 32.0 - 36.0 g/dL    RDW 13.3 11.5 - 14.5 %    Platelets 506 (H) 150 - 450 K/uL    MPV 9.1 (L) 9.2 - 12.9 fL    Immature Granulocytes 1.9 (H) 0.0 - 0.5 %    Gran # (ANC) 6.8 1.8 - 7.7 K/uL    Immature Grans (Abs) 0.19 (H) 0.00 - 0.04 K/uL    Lymph # 1.5 1.0 - 4.8 K/uL    Mono # 1.1 (H) 0.3 - 1.0 K/uL    Eos # 0.3 0.0 - 0.5 K/uL    Baso # 0.08 0.00 - 0.20 K/uL    nRBC 0 0 /100 WBC    Gran % 68.5 38.0 - 73.0 %    Lymph % 15.0 (L) 18.0 - 48.0 %    Mono % 10.9 4.0 - 15.0 %    Eosinophil % 2.9 0.0 - 8.0 %    Basophil % 0.8 0.0 - 1.9 %    Differential Method Automated    Basic metabolic panel    Collection Time: 04/07/23  5:56 AM   Result Value Ref Range    Sodium 139 136 - 145 mmol/L    Potassium 3.9 3.5 - 5.1 mmol/L    Chloride 104 95 - 110 mmol/L    CO2 23 23 - 29 mmol/L    Glucose 217 (H) 70 - 110 mg/dL    BUN 9 6 - 20 mg/dL    Creatinine 1.0 0.5 - 1.4 mg/dL    Calcium 8.9 8.7 - 10.5 mg/dL    Anion Gap 12 8 - 16 mmol/L    eGFR >60 >60 mL/min/1.73 m^2   Magnesium    Collection Time: 04/07/23  5:56 AM   Result Value Ref Range    Magnesium 1.6 1.6 - 2.6 mg/dL   POCT glucose    Collection Time: 04/07/23  7:27 AM   Result Value Ref Range    POCT Glucose 210 (H) 70 - 110 mg/dL   POCT glucose    Collection Time: 04/07/23 11:12 AM   Result Value Ref Range    POCT Glucose 228 (H) 70 - 110 mg/dL   Microalbumin/creatinine urine ratio    Collection Time: 04/14/23 11:03 AM   Result Value Ref Range    Microalbumin, Urine 2000.0 mg/L    Creatinine, Urine 281.0 23.0 - 375.0 mg/dL    Microalb/Creat Ratio 711.7 (H) 0.0 - 30.0 ug/mg   Basic Metabolic Panel    Collection Time: 04/14/23 11:10 AM   Result Value Ref Range    Sodium 141 136 - 145 mmol/L    Potassium 3.2 (L) 3.5 - 5.1 mmol/L    Chloride 103  95 - 110 mmol/L    CO2 31 (H) 23 - 29 mmol/L    Glucose 121 (H) 70 - 110 mg/dL    BUN 13 6 - 20 mg/dL    Creatinine 1.2 0.5 - 1.4 mg/dL    Calcium 8.4 (L) 8.7 - 10.5 mg/dL    Anion Gap 7 (L) 8 - 16 mmol/L    eGFR >60.0 >60 mL/min/1.73 m^2   Lipid Panel    Collection Time: 04/14/23 11:10 AM   Result Value Ref Range    Cholesterol 156 120 - 199 mg/dL    Triglycerides 123 30 - 150 mg/dL    HDL 56 40 - 75 mg/dL    LDL Cholesterol 75.4 63.0 - 159.0 mg/dL    HDL/Cholesterol Ratio 35.9 20.0 - 50.0 %    Total Cholesterol/HDL Ratio 2.8 2.0 - 5.0    Non-HDL Cholesterol 100 mg/dL       Alpa Santana DO  [facility]

## 2023-07-03 NOTE — PROGRESS NOTES
[facility]  Wound Care  Progress Note        Problem List Items Addressed This Visit          Endocrine    Type 2 diabetes mellitus with hyperglycemia    Overview     Elevated HgbA1c and needs repeat.  Neurontin for nerve pain.         Diabetic ulcer of toe of right foot associated with type 2 diabetes mellitus, with necrosis of bone - Primary    Overview     S/p amputation with open wound to amputation site. HgbA1c 12.8 last check.  Recent order from April was not drawn.  Needs better control for healing. Current wound measures 4.8x3.4x0.3 cm to right lateral foot at transmetatarsal amputation of 5th digit and wraps around side of foot.  Slightly smaller but still with excess granulation especially around edges.   Debrided with curette.  Silver nitrate applied to excess granulation.    Apply alginate.  Daily changes.  FU next week.  Started antibiotics few visits for slight erythema and swelling onto dorsum of foot which was resolved..                Orthopedic    Disruption of external surgical wound    Overview     See ulcer description        See wound doc progress notes. Documents will be scanned.        Kori Cruz  [facility]

## 2023-07-05 NOTE — PROGRESS NOTES
[facility]  Wound Care  Progress Note        Problem List Items Addressed This Visit          Endocrine    Type 2 diabetes mellitus with hyperglycemia    Overview     Elevated HgbA1c and needs repeat.  Neurontin for nerve pain.         Diabetic ulcer of toe of right foot associated with type 2 diabetes mellitus, with necrosis of bone - Primary    Overview     S/p amputation with open wound to amputation site. HgbA1c 12.8 last check.  Recent order from April was not drawn.  Needs better control for healing. Current wound measures 4.3x2.4x0.3 cm to right lateral foot at transmetatarsal amputation of 5th digit and wraps around side of foot.  Slightly smaller but still with excess granulation especially around edges.   Debrided with curette.  Silver nitrate applied to excess granulation.    Apply alginate.  Daily changes.  FU next week.  Started antibiotics few visits for slight erythema and swelling onto dorsum of foot which was resolved..                Orthopedic    Disruption of external surgical wound    Overview     See ulcer description        See wound doc progress notes. Documents will be scanned.        Kori Cruz  [facility]

## 2023-07-07 ENCOUNTER — OFFICE VISIT (OUTPATIENT)
Dept: WOUND CARE | Facility: HOSPITAL | Age: 53
End: 2023-07-07
Attending: SURGERY
Payer: MEDICAID

## 2023-07-07 VITALS
TEMPERATURE: 98 F | HEART RATE: 75 BPM | DIASTOLIC BLOOD PRESSURE: 80 MMHG | RESPIRATION RATE: 18 BRPM | SYSTOLIC BLOOD PRESSURE: 139 MMHG

## 2023-07-07 DIAGNOSIS — L97.514 DIABETIC ULCER OF TOE OF RIGHT FOOT ASSOCIATED WITH TYPE 2 DIABETES MELLITUS, WITH NECROSIS OF BONE: Primary | ICD-10-CM

## 2023-07-07 DIAGNOSIS — E11.621 DIABETIC ULCER OF TOE OF RIGHT FOOT ASSOCIATED WITH TYPE 2 DIABETES MELLITUS, WITH NECROSIS OF BONE: Primary | ICD-10-CM

## 2023-07-07 DIAGNOSIS — T81.31XA DISRUPTION OF EXTERNAL SURGICAL WOUND, INITIAL ENCOUNTER: ICD-10-CM

## 2023-07-07 DIAGNOSIS — E11.65 TYPE 2 DIABETES MELLITUS WITH HYPERGLYCEMIA, UNSPECIFIED WHETHER LONG TERM INSULIN USE: ICD-10-CM

## 2023-07-07 PROCEDURE — 99499 NO LOS: ICD-10-PCS | Mod: ,,, | Performed by: STUDENT IN AN ORGANIZED HEALTH CARE EDUCATION/TRAINING PROGRAM

## 2023-07-07 PROCEDURE — 11042 DBRDMT SUBQ TIS 1ST 20SQCM/<: CPT | Mod: ,,, | Performed by: STUDENT IN AN ORGANIZED HEALTH CARE EDUCATION/TRAINING PROGRAM

## 2023-07-07 PROCEDURE — 11042 DBRDMT SUBQ TIS 1ST 20SQCM/<: CPT

## 2023-07-07 PROCEDURE — 99499 UNLISTED E&M SERVICE: CPT | Mod: ,,, | Performed by: STUDENT IN AN ORGANIZED HEALTH CARE EDUCATION/TRAINING PROGRAM

## 2023-07-07 PROCEDURE — 11042 PR DEBRIDEMENT, SKIN, SUB-Q TISSUE,=<20 SQ CM: ICD-10-PCS | Mod: ,,, | Performed by: STUDENT IN AN ORGANIZED HEALTH CARE EDUCATION/TRAINING PROGRAM

## 2023-07-10 ENCOUNTER — PATIENT MESSAGE (OUTPATIENT)
Dept: ADMINISTRATIVE | Facility: HOSPITAL | Age: 53
End: 2023-07-10
Payer: MEDICAID

## 2023-07-18 ENCOUNTER — OFFICE VISIT (OUTPATIENT)
Dept: WOUND CARE | Facility: HOSPITAL | Age: 53
End: 2023-07-18
Attending: STUDENT IN AN ORGANIZED HEALTH CARE EDUCATION/TRAINING PROGRAM
Payer: MEDICAID

## 2023-07-18 VITALS
DIASTOLIC BLOOD PRESSURE: 65 MMHG | RESPIRATION RATE: 18 BRPM | TEMPERATURE: 99 F | HEART RATE: 78 BPM | SYSTOLIC BLOOD PRESSURE: 125 MMHG

## 2023-07-18 DIAGNOSIS — L97.514 DIABETIC ULCER OF TOE OF RIGHT FOOT ASSOCIATED WITH TYPE 2 DIABETES MELLITUS, WITH NECROSIS OF BONE: Primary | ICD-10-CM

## 2023-07-18 DIAGNOSIS — T81.31XA DISRUPTION OF EXTERNAL SURGICAL WOUND, INITIAL ENCOUNTER: ICD-10-CM

## 2023-07-18 DIAGNOSIS — E11.621 DIABETIC ULCER OF TOE OF RIGHT FOOT ASSOCIATED WITH TYPE 2 DIABETES MELLITUS, WITH NECROSIS OF BONE: Primary | ICD-10-CM

## 2023-07-18 DIAGNOSIS — L97.516 NON-PRESSURE CHRONIC ULCER OF OTHER PART OF RIGHT FOOT WITH BONE INVOLVEMENT WITHOUT EVIDENCE OF NECROSIS: ICD-10-CM

## 2023-07-18 PROCEDURE — 11042 DBRDMT SUBQ TIS 1ST 20SQCM/<: CPT | Mod: ,,, | Performed by: STUDENT IN AN ORGANIZED HEALTH CARE EDUCATION/TRAINING PROGRAM

## 2023-07-18 PROCEDURE — 99499 NO LOS: ICD-10-PCS | Mod: ,,, | Performed by: STUDENT IN AN ORGANIZED HEALTH CARE EDUCATION/TRAINING PROGRAM

## 2023-07-18 PROCEDURE — 99499 UNLISTED E&M SERVICE: CPT | Mod: ,,, | Performed by: STUDENT IN AN ORGANIZED HEALTH CARE EDUCATION/TRAINING PROGRAM

## 2023-07-18 PROCEDURE — 11042 PR DEBRIDEMENT, SKIN, SUB-Q TISSUE,=<20 SQ CM: ICD-10-PCS | Mod: ,,, | Performed by: STUDENT IN AN ORGANIZED HEALTH CARE EDUCATION/TRAINING PROGRAM

## 2023-07-18 PROCEDURE — 11042 DBRDMT SUBQ TIS 1ST 20SQCM/<: CPT

## 2023-07-19 DIAGNOSIS — E11.65 TYPE 2 DIABETES MELLITUS WITH HYPERGLYCEMIA, UNSPECIFIED WHETHER LONG TERM INSULIN USE: ICD-10-CM

## 2023-07-19 DIAGNOSIS — E11.9 TYPE 2 DIABETES MELLITUS WITHOUT COMPLICATION, UNSPECIFIED WHETHER LONG TERM INSULIN USE: ICD-10-CM

## 2023-07-24 RX ORDER — METFORMIN HYDROCHLORIDE 1000 MG/1
1000 TABLET ORAL 2 TIMES DAILY WITH MEALS
Qty: 60 TABLET | Refills: 11 | Status: SHIPPED | OUTPATIENT
Start: 2023-07-24 | End: 2024-07-23

## 2023-07-25 ENCOUNTER — OFFICE VISIT (OUTPATIENT)
Dept: WOUND CARE | Facility: HOSPITAL | Age: 53
End: 2023-07-25
Attending: STUDENT IN AN ORGANIZED HEALTH CARE EDUCATION/TRAINING PROGRAM
Payer: MEDICAID

## 2023-07-25 VITALS
HEART RATE: 64 BPM | SYSTOLIC BLOOD PRESSURE: 123 MMHG | DIASTOLIC BLOOD PRESSURE: 61 MMHG | RESPIRATION RATE: 18 BRPM | TEMPERATURE: 98 F

## 2023-07-25 DIAGNOSIS — E11.621 DIABETIC ULCER OF TOE OF RIGHT FOOT ASSOCIATED WITH TYPE 2 DIABETES MELLITUS, WITH NECROSIS OF BONE: Primary | ICD-10-CM

## 2023-07-25 DIAGNOSIS — L97.514 DIABETIC ULCER OF TOE OF RIGHT FOOT ASSOCIATED WITH TYPE 2 DIABETES MELLITUS, WITH NECROSIS OF BONE: Primary | ICD-10-CM

## 2023-07-25 DIAGNOSIS — E11.65 TYPE 2 DIABETES MELLITUS WITH HYPERGLYCEMIA, UNSPECIFIED WHETHER LONG TERM INSULIN USE: ICD-10-CM

## 2023-07-25 DIAGNOSIS — T81.31XA DISRUPTION OF EXTERNAL SURGICAL WOUND, INITIAL ENCOUNTER: ICD-10-CM

## 2023-07-25 DIAGNOSIS — L97.516 NON-PRESSURE CHRONIC ULCER OF OTHER PART OF RIGHT FOOT WITH BONE INVOLVEMENT WITHOUT EVIDENCE OF NECROSIS: ICD-10-CM

## 2023-07-25 PROCEDURE — 11042 DBRDMT SUBQ TIS 1ST 20SQCM/<: CPT | Mod: ,,, | Performed by: STUDENT IN AN ORGANIZED HEALTH CARE EDUCATION/TRAINING PROGRAM

## 2023-07-25 PROCEDURE — 99499 NO LOS: ICD-10-PCS | Mod: ,,, | Performed by: STUDENT IN AN ORGANIZED HEALTH CARE EDUCATION/TRAINING PROGRAM

## 2023-07-25 PROCEDURE — 11042 DBRDMT SUBQ TIS 1ST 20SQCM/<: CPT

## 2023-07-25 PROCEDURE — 99499 UNLISTED E&M SERVICE: CPT | Mod: ,,, | Performed by: STUDENT IN AN ORGANIZED HEALTH CARE EDUCATION/TRAINING PROGRAM

## 2023-07-25 PROCEDURE — 11042 PR DEBRIDEMENT, SKIN, SUB-Q TISSUE,=<20 SQ CM: ICD-10-PCS | Mod: ,,, | Performed by: STUDENT IN AN ORGANIZED HEALTH CARE EDUCATION/TRAINING PROGRAM

## 2023-07-30 NOTE — PROGRESS NOTES
[facility]  Wound Care  Progress Note        HPI:  53-year-old male who presents with 53-year-old male who presents with diabetic ulcer of the right foot.  Compliant with daily wound care.  Voices no complaints. - unchanged      Review of Systems:  12 point ROS negative save for those outlined in HPI          Vitals:    07/07/23 1331   BP: 139/80   Pulse: 75   Resp: 18   Temp: 98.3 °F (36.8 °C)           Physical Exam:  Gen: AAOx3; NAD  CV: RRR  Resp: breaths equal and nonlabored  Abd: Soft, NT, ND;  Ext: no c/c/e  Wound: 2.2 x 3.7 x 0.5cm wound to right plantar foot with viable granulation tissue          Problem List Items Addressed This Visit          Endocrine    Type 2 diabetes mellitus with hyperglycemia    Overview     Elevated HgbA1c and needs repeat.  Neurontin for nerve pain.         Diabetic ulcer of toe of right foot associated with type 2 diabetes mellitus, with necrosis of bone - Primary    Overview     S/p amputation with open wound to amputation site. HgbA1c 12.8 last check.  Recent order from April was not drawn.  Needs better control for healing. Current wound measures 4x2.4x0.6 cm to right lateral foot at transmetatarsal amputation of 5th digit and wraps around side of foot.  Slightly smaller but still with excess granulation especially around edges.   Debrided with curette.  Silver nitrate applied to excess granulation around edges.    Apply alginate.  Daily changes.  FU next week.  Started antibiotics few visits for slight erythema and swelling onto dorsum of foot which resolved..                Orthopedic    Disruption of external surgical wound    Overview     See ulcer description              Plan:  See Wound Docs note for plan and follow up.  Wound debrided in clinic  Silver nitrate to wound  Continue silver dressing daily  RTC 2 weeks       Susie Sullivan  [facility]

## 2023-08-01 NOTE — PROGRESS NOTES
[facility]  Wound Care  Progress Note        HPI:  53-year-old male who presents with 53-year-old male who presents with diabetic ulcer of the right foot.  Compliant with daily wound care.  Voices no complaints. - unchanged      Review of Systems:  12 point ROS negative save for those outlined in HPI          Vitals:    07/25/23 1050   BP: 123/61   Pulse: 64   Resp: 18   Temp: 98.2 °F (36.8 °C)           Physical Exam:  Gen: AAOx3; NAD  CV: RRR  Resp: breaths equal and nonlabored  Abd: Soft, NT, ND;  Ext: no c/c/e  Wound: 2.6 x 3.0 x 1.1 cm wound to right plantar foot with viable granulation tissue          Problem List Items Addressed This Visit          Endocrine    Type 2 diabetes mellitus with hyperglycemia    Overview     Elevated HgbA1c and needs repeat.  Neurontin for nerve pain.         Diabetic ulcer of toe of right foot associated with type 2 diabetes mellitus, with necrosis of bone - Primary    Overview     S/p amputation with open wound to amputation site. HgbA1c 12.8 last check.  Recent order from April was not drawn.  Needs better control for healing. Current wound measures 4x2.4x0.6 cm to right lateral foot at transmetatarsal amputation of 5th digit and wraps around side of foot.  Slightly smaller but still with excess granulation especially around edges.   Debrided with curette.  Silver nitrate applied to excess granulation around edges.    Apply alginate.  Daily changes.  FU next week.  Started antibiotics few visits for slight erythema and swelling onto dorsum of foot which resolved..                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  Wound debrided in clinic  Silver nitrate to wound  Continue silver dressing  daily  RTC 1 week       Susie Sullivan  [facility]

## 2023-08-01 NOTE — PROGRESS NOTES
[facility]  Wound Care  Progress Note        HPI:  53-year-old male who presents with 53-year-old male who presents with diabetic ulcer of the right foot.  Compliant with daily wound care.  Voices no complaints. - unchanged       Review of Systems:  12 point ROS negative save for those outlined in HPI          Vitals:    07/18/23 1212   BP: 125/65   Pulse: 78   Resp: 18   Temp: 98.6 °F (37 °C)           Physical Exam:  Gen: AAOx3; NAD  CV: RRR  Resp: breaths equal and nonlabored  Abd: Soft, NT, ND;  Ext: no c/c/e  Wound: 2.2 x 3.7 x 0.5cm wound to right plantar foot with viable granulation tissue          Problem List Items Addressed This Visit          Endocrine    Diabetic ulcer of toe of right foot associated with type 2 diabetes mellitus, with necrosis of bone - Primary    Overview     S/p amputation with open wound to amputation site. HgbA1c 12.8 last check.  Recent order from April was not drawn.  Needs better control for healing. Current wound measures 4x2.4x0.6 cm to right lateral foot at transmetatarsal amputation of 5th digit and wraps around side of foot.  Slightly smaller but still with excess granulation especially around edges.   Debrided with curette.  Silver nitrate applied to excess granulation around edges.    Apply alginate.  Daily changes.  FU next week.  Started antibiotics few visits for slight erythema and swelling onto dorsum of foot which resolved..                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  Wound debrided in clinic  Silver nitrate to wound  Continue silver dressing daily  RTC 1 week       Susiesterling Sullivan  [facility]

## 2023-08-02 ENCOUNTER — OFFICE VISIT (OUTPATIENT)
Dept: WOUND CARE | Facility: HOSPITAL | Age: 53
End: 2023-08-02
Attending: SURGERY
Payer: MEDICAID

## 2023-08-02 VITALS
HEART RATE: 77 BPM | TEMPERATURE: 98 F | DIASTOLIC BLOOD PRESSURE: 63 MMHG | SYSTOLIC BLOOD PRESSURE: 127 MMHG | RESPIRATION RATE: 18 BRPM

## 2023-08-02 DIAGNOSIS — T81.31XA DISRUPTION OF EXTERNAL SURGICAL WOUND, INITIAL ENCOUNTER: ICD-10-CM

## 2023-08-02 DIAGNOSIS — L97.516 NON-PRESSURE CHRONIC ULCER OF OTHER PART OF RIGHT FOOT WITH BONE INVOLVEMENT WITHOUT EVIDENCE OF NECROSIS: ICD-10-CM

## 2023-08-02 DIAGNOSIS — L97.514 DIABETIC ULCER OF TOE OF RIGHT FOOT ASSOCIATED WITH TYPE 2 DIABETES MELLITUS, WITH NECROSIS OF BONE: Primary | ICD-10-CM

## 2023-08-02 DIAGNOSIS — E11.65 TYPE 2 DIABETES MELLITUS WITH HYPERGLYCEMIA, UNSPECIFIED WHETHER LONG TERM INSULIN USE: ICD-10-CM

## 2023-08-02 DIAGNOSIS — E11.621 DIABETIC ULCER OF TOE OF RIGHT FOOT ASSOCIATED WITH TYPE 2 DIABETES MELLITUS, WITH NECROSIS OF BONE: Primary | ICD-10-CM

## 2023-08-02 PROCEDURE — 17250 CHEM CAUT OF GRANLTJ TISSUE: CPT

## 2023-08-03 NOTE — PROGRESS NOTES
[facility]  Wound Care  Progress Note        Problem List Items Addressed This Visit          Endocrine    Type 2 diabetes mellitus with hyperglycemia    Overview     Elevated HgbA1c and needs repeat.  Neurontin for nerve pain.         Diabetic ulcer of toe of right foot associated with type 2 diabetes mellitus, with necrosis of bone - Primary    Overview     S/p amputation with open wound to amputation site. HgbA1c 6.3 last check on 7/7/23.  Recent order from April was not drawn.  Needs better control for healing. Current wound measures 2.6x3x0.6 cm to right lateral foot at transmetatarsal amputation of 5th digit and wraps around side of foot.  Slightly smaller but still with excess granulation centrally   Debrided with curette.  Silver nitrate applied to excess granulation around edges.    Apply alginate.  Daily changes.  FU next week.              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 Documents will be scanned.        Kori Cruz  [facility]

## 2023-08-09 ENCOUNTER — OFFICE VISIT (OUTPATIENT)
Dept: WOUND CARE | Facility: HOSPITAL | Age: 53
End: 2023-08-09
Attending: SURGERY
Payer: MEDICAID

## 2023-08-09 VITALS
RESPIRATION RATE: 18 BRPM | SYSTOLIC BLOOD PRESSURE: 130 MMHG | TEMPERATURE: 98 F | DIASTOLIC BLOOD PRESSURE: 72 MMHG | HEART RATE: 83 BPM

## 2023-08-09 DIAGNOSIS — T81.31XA DISRUPTION OF EXTERNAL SURGICAL WOUND, INITIAL ENCOUNTER: ICD-10-CM

## 2023-08-09 DIAGNOSIS — E11.621 DIABETIC ULCER OF TOE OF RIGHT FOOT ASSOCIATED WITH TYPE 2 DIABETES MELLITUS, WITH NECROSIS OF BONE: Primary | ICD-10-CM

## 2023-08-09 DIAGNOSIS — L97.516 NON-PRESSURE CHRONIC ULCER OF OTHER PART OF RIGHT FOOT WITH BONE INVOLVEMENT WITHOUT EVIDENCE OF NECROSIS: ICD-10-CM

## 2023-08-09 DIAGNOSIS — L97.514 DIABETIC ULCER OF TOE OF RIGHT FOOT ASSOCIATED WITH TYPE 2 DIABETES MELLITUS, WITH NECROSIS OF BONE: Primary | ICD-10-CM

## 2023-08-09 PROCEDURE — 11042 DBRDMT SUBQ TIS 1ST 20SQCM/<: CPT

## 2023-08-16 ENCOUNTER — OFFICE VISIT (OUTPATIENT)
Dept: WOUND CARE | Facility: HOSPITAL | Age: 53
End: 2023-08-16
Attending: SURGERY
Payer: MEDICAID

## 2023-08-16 VITALS
SYSTOLIC BLOOD PRESSURE: 135 MMHG | TEMPERATURE: 98 F | RESPIRATION RATE: 18 BRPM | DIASTOLIC BLOOD PRESSURE: 67 MMHG | HEART RATE: 78 BPM

## 2023-08-16 DIAGNOSIS — E11.65 TYPE 2 DIABETES MELLITUS WITH HYPERGLYCEMIA, UNSPECIFIED WHETHER LONG TERM INSULIN USE: ICD-10-CM

## 2023-08-16 DIAGNOSIS — T81.31XD DISRUPTION OF EXTERNAL SURGICAL WOUND, SUBSEQUENT ENCOUNTER: ICD-10-CM

## 2023-08-16 DIAGNOSIS — L97.514 DIABETIC ULCER OF TOE OF RIGHT FOOT ASSOCIATED WITH TYPE 2 DIABETES MELLITUS, WITH NECROSIS OF BONE: Primary | ICD-10-CM

## 2023-08-16 DIAGNOSIS — L97.516 NON-PRESSURE CHRONIC ULCER OF OTHER PART OF RIGHT FOOT WITH BONE INVOLVEMENT WITHOUT EVIDENCE OF NECROSIS: ICD-10-CM

## 2023-08-16 DIAGNOSIS — E11.621 DIABETIC ULCER OF TOE OF RIGHT FOOT ASSOCIATED WITH TYPE 2 DIABETES MELLITUS, WITH NECROSIS OF BONE: Primary | ICD-10-CM

## 2023-08-16 PROCEDURE — 17250 CHEM CAUT OF GRANLTJ TISSUE: CPT | Mod: 59

## 2023-08-16 PROCEDURE — 11042 DBRDMT SUBQ TIS 1ST 20SQCM/<: CPT

## 2023-08-20 NOTE — PROGRESS NOTES
[facility]  Wound Care  Progress Note        Problem List Items Addressed This Visit          Endocrine    Type 2 diabetes mellitus with hyperglycemia    Overview     Elevated HgbA1c and needs repeat.  Neurontin for nerve pain.         Diabetic ulcer of toe of right foot associated with type 2 diabetes mellitus, with necrosis of bone - Primary    Overview     S/p amputation with open wound to amputation site. HgbA1c 6.3 last check on 7/7/23.  Recent order from April was not drawn.  Needs better control for healing. Current wound measures 2x4.2x0.5 cm to right lateral foot at transmetatarsal amputation of 5th digit and wraps around side of foot.  Slightly smaller length but length increased due to spongy granulation under dorsal surface resulting in de-epithelialization.  Cont excess granulation centrally   Debrided with curette and scissors.  Silver nitrate applied to excess granulation around edges.    Apply alginate.  Daily changes.  FU next week.              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 Documents will be scanned.        Kori Cruz  [facility]

## 2023-08-23 ENCOUNTER — OFFICE VISIT (OUTPATIENT)
Dept: WOUND CARE | Facility: HOSPITAL | Age: 53
End: 2023-08-23
Attending: SURGERY
Payer: MEDICAID

## 2023-08-23 VITALS
HEART RATE: 77 BPM | DIASTOLIC BLOOD PRESSURE: 62 MMHG | RESPIRATION RATE: 17 BRPM | SYSTOLIC BLOOD PRESSURE: 130 MMHG | TEMPERATURE: 98 F

## 2023-08-23 DIAGNOSIS — L97.516 NON-PRESSURE CHRONIC ULCER OF OTHER PART OF RIGHT FOOT WITH BONE INVOLVEMENT WITHOUT EVIDENCE OF NECROSIS: Primary | ICD-10-CM

## 2023-08-23 DIAGNOSIS — T81.31XD DISRUPTION OF EXTERNAL SURGICAL WOUND, SUBSEQUENT ENCOUNTER: ICD-10-CM

## 2023-08-23 DIAGNOSIS — L97.514 DIABETIC ULCER OF TOE OF RIGHT FOOT ASSOCIATED WITH TYPE 2 DIABETES MELLITUS, WITH NECROSIS OF BONE: ICD-10-CM

## 2023-08-23 DIAGNOSIS — E11.621 DIABETIC ULCER OF TOE OF RIGHT FOOT ASSOCIATED WITH TYPE 2 DIABETES MELLITUS, WITH NECROSIS OF BONE: ICD-10-CM

## 2023-08-23 DIAGNOSIS — E11.65 TYPE 2 DIABETES MELLITUS WITH HYPERGLYCEMIA, UNSPECIFIED WHETHER LONG TERM INSULIN USE: ICD-10-CM

## 2023-08-23 PROCEDURE — 11042 DBRDMT SUBQ TIS 1ST 20SQCM/<: CPT

## 2023-08-23 NOTE — PROGRESS NOTES
[facility]  Wound Care  Progress Note        Problem List Items Addressed This Visit          Endocrine    Type 2 diabetes mellitus with hyperglycemia    Overview     Elevated HgbA1c and needs repeat.  Neurontin for nerve pain.         Diabetic ulcer of toe of right foot associated with type 2 diabetes mellitus, with necrosis of bone    Overview     S/p amputation with open wound to amputation site. HgbA1c 6.3 last check on 7/7/23.  Recent order from April was not drawn.  Needs better control for healing. Current wound measures 1.8x3.5x0.3 cm to right lateral foot at transmetatarsal amputation of 5th digit and wraps around side of foot.  Length that increased last visit improved with re-epithelialization.  Cont excess granulation centrally but improving each visit. Debrided with scissors to excise excess granulation above epithelial surface.  Silver nitrate applied to excess granulation around edges and to control bleeding from debridement.    Apply alginate.  Daily changes.  FU next week.              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 Documents will be scanned.        Kori Cruz  [facility]

## 2023-08-30 ENCOUNTER — OFFICE VISIT (OUTPATIENT)
Dept: WOUND CARE | Facility: HOSPITAL | Age: 53
End: 2023-08-30
Attending: SURGERY
Payer: MEDICAID

## 2023-08-30 VITALS
DIASTOLIC BLOOD PRESSURE: 67 MMHG | HEART RATE: 74 BPM | SYSTOLIC BLOOD PRESSURE: 127 MMHG | TEMPERATURE: 98 F | RESPIRATION RATE: 18 BRPM

## 2023-08-30 DIAGNOSIS — E11.65 TYPE 2 DIABETES MELLITUS WITH HYPERGLYCEMIA, UNSPECIFIED WHETHER LONG TERM INSULIN USE: ICD-10-CM

## 2023-08-30 DIAGNOSIS — L97.514 DIABETIC ULCER OF TOE OF RIGHT FOOT ASSOCIATED WITH TYPE 2 DIABETES MELLITUS, WITH NECROSIS OF BONE: ICD-10-CM

## 2023-08-30 DIAGNOSIS — L97.516 NON-PRESSURE CHRONIC ULCER OF OTHER PART OF RIGHT FOOT WITH BONE INVOLVEMENT WITHOUT EVIDENCE OF NECROSIS: Primary | ICD-10-CM

## 2023-08-30 DIAGNOSIS — T81.31XD DISRUPTION OF EXTERNAL SURGICAL WOUND, SUBSEQUENT ENCOUNTER: ICD-10-CM

## 2023-08-30 DIAGNOSIS — E11.621 DIABETIC ULCER OF TOE OF RIGHT FOOT ASSOCIATED WITH TYPE 2 DIABETES MELLITUS, WITH NECROSIS OF BONE: ICD-10-CM

## 2023-08-30 NOTE — PROGRESS NOTES
[facility]  Wound Care  Progress Note        Problem List Items Addressed This Visit          Endocrine    Type 2 diabetes mellitus with hyperglycemia    Overview     Elevated HgbA1c and needs repeat.  Neurontin for nerve pain.         Diabetic ulcer of toe of right foot associated with type 2 diabetes mellitus, with necrosis of bone    Overview     S/p amputation with open wound to amputation site. HgbA1c 6.3 last check on 7/7/23.  Recent order from April was not drawn.  Needs better control for healing. Current wound measures 2x3x0.2 cm to right lateral foot at transmetatarsal amputation of 5th digit and wraps around side of foot.  Length that increased last visit improved again with re-epithelialization bridging across woun.  Cont excess granulation centrally but improving each visit with minimal above skin level now. Debrided with curette.    Apply alginate.  Daily changes.  FU next week.              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 Documents will be scanned.        Kori Cruz  [facility]

## 2023-09-06 ENCOUNTER — OFFICE VISIT (OUTPATIENT)
Dept: WOUND CARE | Facility: HOSPITAL | Age: 53
End: 2023-09-06
Attending: SURGERY
Payer: MEDICAID

## 2023-09-06 VITALS
DIASTOLIC BLOOD PRESSURE: 72 MMHG | SYSTOLIC BLOOD PRESSURE: 134 MMHG | TEMPERATURE: 98 F | HEART RATE: 74 BPM | RESPIRATION RATE: 19 BRPM

## 2023-09-06 DIAGNOSIS — E11.65 TYPE 2 DIABETES MELLITUS WITH HYPERGLYCEMIA, UNSPECIFIED WHETHER LONG TERM INSULIN USE: ICD-10-CM

## 2023-09-06 DIAGNOSIS — E11.621 DIABETIC ULCER OF TOE OF RIGHT FOOT ASSOCIATED WITH TYPE 2 DIABETES MELLITUS, WITH NECROSIS OF BONE: ICD-10-CM

## 2023-09-06 DIAGNOSIS — L97.514 DIABETIC ULCER OF TOE OF RIGHT FOOT ASSOCIATED WITH TYPE 2 DIABETES MELLITUS, WITH NECROSIS OF BONE: ICD-10-CM

## 2023-09-06 DIAGNOSIS — L97.516 NON-PRESSURE CHRONIC ULCER OF OTHER PART OF RIGHT FOOT WITH BONE INVOLVEMENT WITHOUT EVIDENCE OF NECROSIS: Primary | ICD-10-CM

## 2023-09-06 DIAGNOSIS — T81.31XD DISRUPTION OF EXTERNAL SURGICAL WOUND, SUBSEQUENT ENCOUNTER: ICD-10-CM

## 2023-09-06 PROCEDURE — 11042 DBRDMT SUBQ TIS 1ST 20SQCM/<: CPT

## 2023-09-06 PROCEDURE — 17250 CHEM CAUT OF GRANLTJ TISSUE: CPT

## 2023-09-13 ENCOUNTER — CLINICAL SUPPORT (OUTPATIENT)
Dept: WOUND CARE | Facility: HOSPITAL | Age: 53
End: 2023-09-13
Attending: SURGERY
Payer: MEDICAID

## 2023-09-13 VITALS
TEMPERATURE: 98 F | SYSTOLIC BLOOD PRESSURE: 125 MMHG | DIASTOLIC BLOOD PRESSURE: 68 MMHG | RESPIRATION RATE: 18 BRPM | HEART RATE: 70 BPM

## 2023-09-13 DIAGNOSIS — T81.31XD DISRUPTION OF EXTERNAL SURGICAL WOUND, SUBSEQUENT ENCOUNTER: ICD-10-CM

## 2023-09-13 DIAGNOSIS — E11.65 TYPE 2 DIABETES MELLITUS WITH HYPERGLYCEMIA, UNSPECIFIED WHETHER LONG TERM INSULIN USE: ICD-10-CM

## 2023-09-13 DIAGNOSIS — E11.621 DIABETIC ULCER OF TOE OF RIGHT FOOT ASSOCIATED WITH TYPE 2 DIABETES MELLITUS, WITH NECROSIS OF BONE: ICD-10-CM

## 2023-09-13 DIAGNOSIS — L97.516 NON-PRESSURE CHRONIC ULCER OF OTHER PART OF RIGHT FOOT WITH BONE INVOLVEMENT WITHOUT EVIDENCE OF NECROSIS: Primary | ICD-10-CM

## 2023-09-13 DIAGNOSIS — L97.514 DIABETIC ULCER OF TOE OF RIGHT FOOT ASSOCIATED WITH TYPE 2 DIABETES MELLITUS, WITH NECROSIS OF BONE: ICD-10-CM

## 2023-09-13 PROCEDURE — 11042 DBRDMT SUBQ TIS 1ST 20SQCM/<: CPT

## 2023-09-13 NOTE — PROGRESS NOTES
See wound care assessment documentation in chart review. Scanned under media tab.        TeleMedicine Patient Consent    This visit was performed as a virtual video visit using a synchronous, two-way, audio-video telehealth technology platform. Patient identification was verified at the start of the visit, including the patient's telephone number and physical location. I discussed with the patient the nature of our telehealth visits, that:     1. Due to the nature of an audio- video modality, the only components of a physical exam that could be done are the elements supported by direct observation. 2. I would evaluate the patient and recommend diagnostics and treatments based on my assessment. 3. If it was felt that the patient should be evaluated in clinic or an emergency room setting, then they would be directed there. 4. Our sessions are not being recorded and that personal health information is protected. 5. Our team would provide follow up care in person if/when the patient needs it. Patient does agree to proceed with telemedicine consultation. Patient's location: home address in [place]. Is there anyone else present for this visit: No  This visit was completed virtually using allan/haiku/karol    Physician Location:   [place] [id number]    Time spent: Greater than Not billed by time    6/30/2020    TELEHEALTH EVALUATION -- Audio or Visual (During [id number]-[id number] public health emergency)    HPI:     Hyperlipidemia:  Patient is here to follow up regarding chronic hyperlipidemia. This is  generally controlled. Treatment includes diet. Patient is  compliant with lifestyle modifications. Patient is not a smoker. Most recent labs reviewed with patient today and are not remarkable. Comorbid conditions include obesity    Patient's past medical, surgical, social and/or family history reviewed, updated in chart, and are non-contributory (unless otherwise stated). Medications and allergies also reviewed and updated in chart.

## 2023-09-13 NOTE — PROGRESS NOTES
[facility]  Wound Care  Progress Note        Problem List Items Addressed This Visit          Endocrine    Type 2 diabetes mellitus with hyperglycemia    Overview     Elevated HgbA1c initially.  Improved 7/7/23 to 6.3.  Neurontin for nerve pain.         Diabetic ulcer of toe of right foot associated with type 2 diabetes mellitus, with necrosis of bone    Overview     S/p amputation with open wound to amputation site. HgbA1c 6.3 last check on 7/7/23.  Recent order from April was not drawn.  Needs better control for healing. Current wound measures 1.7x1.2x0.1 cm to right lateral foot at transmetatarsal amputation of 5th digit and wraps around side of foot.  Length that increased last visit improved again with re-epithelialization bridging across wound.  Cont excess granulation centrally but improving each visit with minimal above skin level now. Debrided with curette.    Edges and small satellite coated with silver nitrate for excess granulation.  Apply alginate.  Daily changes.  FU next week.              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 Documents will be scanned.        Kori Cruz  [facility]

## 2023-09-17 NOTE — PROGRESS NOTES
[facility]  Wound Care  Progress Note        Problem List Items Addressed This Visit          Endocrine    Type 2 diabetes mellitus with hyperglycemia    Overview     Elevated HgbA1c initially.  Improved 7/7/23 to 6.3.  Neurontin for nerve pain.         Diabetic ulcer of toe of right foot associated with type 2 diabetes mellitus, with necrosis of bone    Overview     S/p amputation with open wound to amputation site. HgbA1c 6.3 last check on 7/7/23.  Recent order from April was not drawn.  Needs better control for healing. Current wound measures 1.4x1x0.1 cm to right lateral foot at transmetatarsal amputation of 5th digit and wraps around side of foot.  Length that increased last visit improved again with re-epithelialization bridging across wound and small granulation sinus as satellite lesion.  Cont excess granulation centrally but improving each visit with minimal above skin level now. Debrided with curette.    Edges and small satellite coated with silver nitrate for excess granulation.  Apply alginate.  Daily changes.  FU next week.              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 Documents will be scanned.        Kori Cruz  [facility]

## 2023-09-20 ENCOUNTER — CLINICAL SUPPORT (OUTPATIENT)
Dept: WOUND CARE | Facility: HOSPITAL | Age: 53
End: 2023-09-20
Attending: SURGERY
Payer: MEDICAID

## 2023-09-20 VITALS
TEMPERATURE: 96 F | SYSTOLIC BLOOD PRESSURE: 127 MMHG | HEART RATE: 88 BPM | RESPIRATION RATE: 19 BRPM | DIASTOLIC BLOOD PRESSURE: 68 MMHG

## 2023-09-20 DIAGNOSIS — E11.65 TYPE 2 DIABETES MELLITUS WITH HYPERGLYCEMIA, UNSPECIFIED WHETHER LONG TERM INSULIN USE: ICD-10-CM

## 2023-09-20 DIAGNOSIS — L97.514 DIABETIC ULCER OF TOE OF RIGHT FOOT ASSOCIATED WITH TYPE 2 DIABETES MELLITUS, WITH NECROSIS OF BONE: ICD-10-CM

## 2023-09-20 DIAGNOSIS — L97.516 NON-PRESSURE CHRONIC ULCER OF OTHER PART OF RIGHT FOOT WITH BONE INVOLVEMENT WITHOUT EVIDENCE OF NECROSIS: Primary | ICD-10-CM

## 2023-09-20 DIAGNOSIS — E11.621 DIABETIC ULCER OF TOE OF RIGHT FOOT ASSOCIATED WITH TYPE 2 DIABETES MELLITUS, WITH NECROSIS OF BONE: ICD-10-CM

## 2023-09-20 PROCEDURE — 11042 DBRDMT SUBQ TIS 1ST 20SQCM/<: CPT

## 2023-09-27 ENCOUNTER — OFFICE VISIT (OUTPATIENT)
Dept: WOUND CARE | Facility: HOSPITAL | Age: 53
End: 2023-09-27
Attending: SURGERY
Payer: MEDICAID

## 2023-09-27 VITALS
HEART RATE: 78 BPM | TEMPERATURE: 98 F | SYSTOLIC BLOOD PRESSURE: 134 MMHG | DIASTOLIC BLOOD PRESSURE: 72 MMHG | RESPIRATION RATE: 19 BRPM

## 2023-09-27 DIAGNOSIS — E11.621 DIABETIC ULCER OF TOE OF RIGHT FOOT ASSOCIATED WITH TYPE 2 DIABETES MELLITUS, WITH NECROSIS OF BONE: ICD-10-CM

## 2023-09-27 DIAGNOSIS — T81.31XD DISRUPTION OF EXTERNAL SURGICAL WOUND, SUBSEQUENT ENCOUNTER: ICD-10-CM

## 2023-09-27 DIAGNOSIS — L97.516 NON-PRESSURE CHRONIC ULCER OF OTHER PART OF RIGHT FOOT WITH BONE INVOLVEMENT WITHOUT EVIDENCE OF NECROSIS: Primary | ICD-10-CM

## 2023-09-27 DIAGNOSIS — E11.65 TYPE 2 DIABETES MELLITUS WITH HYPERGLYCEMIA, UNSPECIFIED WHETHER LONG TERM INSULIN USE: ICD-10-CM

## 2023-09-27 DIAGNOSIS — L97.514 DIABETIC ULCER OF TOE OF RIGHT FOOT ASSOCIATED WITH TYPE 2 DIABETES MELLITUS, WITH NECROSIS OF BONE: ICD-10-CM

## 2023-09-27 PROCEDURE — 17250 CHEM CAUT OF GRANLTJ TISSUE: CPT

## 2023-09-27 PROCEDURE — 11042 DBRDMT SUBQ TIS 1ST 20SQCM/<: CPT

## 2023-09-27 NOTE — PROGRESS NOTES
[facility]  Wound Care  Progress Note        Problem List Items Addressed This Visit          Endocrine    Type 2 diabetes mellitus with hyperglycemia    Overview     Elevated HgbA1c initially.  Improved 7/7/23 to 6.3.  Neurontin for nerve pain.         Diabetic ulcer of toe of right foot associated with type 2 diabetes mellitus, with necrosis of bone    Overview     Presented initially s/p amputation with open wound to amputation site. HgbA1c 6.3 last check on 7/7/23.  Recent order from April was not drawn.  Needs better control for healing. Current wound measures 0.3x0.5x0.4 cm to right lateral foot at transmetatarsal amputation of 5th digit and wraps around side of foot with a punctate open area of granulation.  Length that increased last visit improved again with re-epithelialization bridging across wound and small granulation sinus as satellite lesion.  Debrided with curette.     Apply alginate.  Daily changes.  FU next week.              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 Documents will be scanned.        Kori Cruz  [facility]

## 2023-09-29 ENCOUNTER — OFFICE VISIT (OUTPATIENT)
Dept: PRIMARY CARE CLINIC | Facility: CLINIC | Age: 53
End: 2023-09-29
Payer: MEDICAID

## 2023-09-29 VITALS
HEIGHT: 71 IN | DIASTOLIC BLOOD PRESSURE: 63 MMHG | TEMPERATURE: 98 F | RESPIRATION RATE: 16 BRPM | SYSTOLIC BLOOD PRESSURE: 122 MMHG | WEIGHT: 225 LBS | BODY MASS INDEX: 31.5 KG/M2 | HEART RATE: 71 BPM | OXYGEN SATURATION: 95 %

## 2023-09-29 DIAGNOSIS — E11.69 DYSLIPIDEMIA ASSOCIATED WITH TYPE 2 DIABETES MELLITUS: ICD-10-CM

## 2023-09-29 DIAGNOSIS — E78.2 HYPERCHOLESTEROLEMIA WITH HYPERGLYCERIDEMIA: ICD-10-CM

## 2023-09-29 DIAGNOSIS — E11.65 TYPE 2 DIABETES MELLITUS WITH HYPERGLYCEMIA, UNSPECIFIED WHETHER LONG TERM INSULIN USE: Primary | ICD-10-CM

## 2023-09-29 DIAGNOSIS — I10 PRIMARY HYPERTENSION: ICD-10-CM

## 2023-09-29 DIAGNOSIS — D64.9 ANEMIA, UNSPECIFIED TYPE: ICD-10-CM

## 2023-09-29 DIAGNOSIS — E78.5 DYSLIPIDEMIA ASSOCIATED WITH TYPE 2 DIABETES MELLITUS: ICD-10-CM

## 2023-09-29 DIAGNOSIS — E66.9 CLASS 1 OBESITY WITH SERIOUS COMORBIDITY AND BODY MASS INDEX (BMI) OF 31.0 TO 31.9 IN ADULT, UNSPECIFIED OBESITY TYPE: ICD-10-CM

## 2023-09-29 PROCEDURE — 1159F PR MEDICATION LIST DOCUMENTED IN MEDICAL RECORD: ICD-10-PCS | Mod: CPTII,,, | Performed by: STUDENT IN AN ORGANIZED HEALTH CARE EDUCATION/TRAINING PROGRAM

## 2023-09-29 PROCEDURE — 99999 PR PBB SHADOW E&M-EST. PATIENT-LVL IV: ICD-10-PCS | Mod: PBBFAC,,, | Performed by: STUDENT IN AN ORGANIZED HEALTH CARE EDUCATION/TRAINING PROGRAM

## 2023-09-29 PROCEDURE — 3044F HG A1C LEVEL LT 7.0%: CPT | Mod: CPTII,,, | Performed by: STUDENT IN AN ORGANIZED HEALTH CARE EDUCATION/TRAINING PROGRAM

## 2023-09-29 PROCEDURE — 3008F PR BODY MASS INDEX (BMI) DOCUMENTED: ICD-10-PCS | Mod: CPTII,,, | Performed by: STUDENT IN AN ORGANIZED HEALTH CARE EDUCATION/TRAINING PROGRAM

## 2023-09-29 PROCEDURE — 99999 PR PBB SHADOW E&M-EST. PATIENT-LVL IV: CPT | Mod: PBBFAC,,, | Performed by: STUDENT IN AN ORGANIZED HEALTH CARE EDUCATION/TRAINING PROGRAM

## 2023-09-29 PROCEDURE — 4010F PR ACE/ARB THEARPY RXD/TAKEN: ICD-10-PCS | Mod: CPTII,,, | Performed by: STUDENT IN AN ORGANIZED HEALTH CARE EDUCATION/TRAINING PROGRAM

## 2023-09-29 PROCEDURE — 3066F NEPHROPATHY DOC TX: CPT | Mod: CPTII,,, | Performed by: STUDENT IN AN ORGANIZED HEALTH CARE EDUCATION/TRAINING PROGRAM

## 2023-09-29 PROCEDURE — 3008F BODY MASS INDEX DOCD: CPT | Mod: CPTII,,, | Performed by: STUDENT IN AN ORGANIZED HEALTH CARE EDUCATION/TRAINING PROGRAM

## 2023-09-29 PROCEDURE — 3074F PR MOST RECENT SYSTOLIC BLOOD PRESSURE < 130 MM HG: ICD-10-PCS | Mod: CPTII,,, | Performed by: STUDENT IN AN ORGANIZED HEALTH CARE EDUCATION/TRAINING PROGRAM

## 2023-09-29 PROCEDURE — 3078F DIAST BP <80 MM HG: CPT | Mod: CPTII,,, | Performed by: STUDENT IN AN ORGANIZED HEALTH CARE EDUCATION/TRAINING PROGRAM

## 2023-09-29 PROCEDURE — 99213 PR OFFICE/OUTPT VISIT, EST, LEVL III, 20-29 MIN: ICD-10-PCS | Mod: S$PBB,,, | Performed by: STUDENT IN AN ORGANIZED HEALTH CARE EDUCATION/TRAINING PROGRAM

## 2023-09-29 PROCEDURE — 3062F POS MACROALBUMINURIA REV: CPT | Mod: CPTII,,, | Performed by: STUDENT IN AN ORGANIZED HEALTH CARE EDUCATION/TRAINING PROGRAM

## 2023-09-29 PROCEDURE — 3044F PR MOST RECENT HEMOGLOBIN A1C LEVEL <7.0%: ICD-10-PCS | Mod: CPTII,,, | Performed by: STUDENT IN AN ORGANIZED HEALTH CARE EDUCATION/TRAINING PROGRAM

## 2023-09-29 PROCEDURE — 1160F PR REVIEW ALL MEDS BY PRESCRIBER/CLIN PHARMACIST DOCUMENTED: ICD-10-PCS | Mod: CPTII,,, | Performed by: STUDENT IN AN ORGANIZED HEALTH CARE EDUCATION/TRAINING PROGRAM

## 2023-09-29 PROCEDURE — 4010F ACE/ARB THERAPY RXD/TAKEN: CPT | Mod: CPTII,,, | Performed by: STUDENT IN AN ORGANIZED HEALTH CARE EDUCATION/TRAINING PROGRAM

## 2023-09-29 PROCEDURE — 3066F PR DOCUMENTATION OF TREATMENT FOR NEPHROPATHY: ICD-10-PCS | Mod: CPTII,,, | Performed by: STUDENT IN AN ORGANIZED HEALTH CARE EDUCATION/TRAINING PROGRAM

## 2023-09-29 PROCEDURE — 3062F PR POS MACROALBUMINURIA RESULT DOCUMENTED/REVIEW: ICD-10-PCS | Mod: CPTII,,, | Performed by: STUDENT IN AN ORGANIZED HEALTH CARE EDUCATION/TRAINING PROGRAM

## 2023-09-29 PROCEDURE — 99214 OFFICE O/P EST MOD 30 MIN: CPT | Mod: PBBFAC,PN | Performed by: STUDENT IN AN ORGANIZED HEALTH CARE EDUCATION/TRAINING PROGRAM

## 2023-09-29 PROCEDURE — 3078F PR MOST RECENT DIASTOLIC BLOOD PRESSURE < 80 MM HG: ICD-10-PCS | Mod: CPTII,,, | Performed by: STUDENT IN AN ORGANIZED HEALTH CARE EDUCATION/TRAINING PROGRAM

## 2023-09-29 PROCEDURE — 1160F RVW MEDS BY RX/DR IN RCRD: CPT | Mod: CPTII,,, | Performed by: STUDENT IN AN ORGANIZED HEALTH CARE EDUCATION/TRAINING PROGRAM

## 2023-09-29 PROCEDURE — 3074F SYST BP LT 130 MM HG: CPT | Mod: CPTII,,, | Performed by: STUDENT IN AN ORGANIZED HEALTH CARE EDUCATION/TRAINING PROGRAM

## 2023-09-29 PROCEDURE — 1159F MED LIST DOCD IN RCRD: CPT | Mod: CPTII,,, | Performed by: STUDENT IN AN ORGANIZED HEALTH CARE EDUCATION/TRAINING PROGRAM

## 2023-09-29 PROCEDURE — 99213 OFFICE O/P EST LOW 20 MIN: CPT | Mod: S$PBB,,, | Performed by: STUDENT IN AN ORGANIZED HEALTH CARE EDUCATION/TRAINING PROGRAM

## 2023-09-29 NOTE — ASSESSMENT & PLAN NOTE
Lab Results   Component Value Date    HGBA1C 6.3 (H) 07/07/2023    HGBA1C 12.8 (H) 04/04/2023

## 2023-09-29 NOTE — ASSESSMENT & PLAN NOTE
BP Readings from Last 3 Encounters:   09/29/23 122/63   09/27/23 134/72   09/20/23 127/68   Normotensive.   - increase procardia ER 60 mg from 30 mg daily  - continue losartan 100 mg  - continue metoprolol 12.5 mg BID  - f/u 4 weeks

## 2023-09-29 NOTE — PROGRESS NOTES
"[facility] Note    HPI:  Willam Laurent is a 53 y.o. male who presents today for Health Maintenance (Patient here for 4 month check up)    53 year old male with hypertension, diabetes mellitus type 2, hyperlipidemia, iron deficiency anemia, diabetic foot ulcer s/p amputation of the toe in right foot presents for follow up.   Overall patient has been feeling well, back to working full time without restrictions of continued pain.   Patient still requires eye exam appointment with optometry.   POC glucose at home fasting AM running 82-94, post pradial 140-150s and at bedtime <140.   Currently tolerating basal insulin and metformin.   Denies fever, chills, excessive headaches, vision changes, chest pain, palpitations, shortness of breath, abdominal pain, nausea, vomiting, diarrhea, constipation.      ROS   A review of systems was performed and was negative except as noted above.    I personally reviewed allergies, past medical, surgical, social and family history and updated as appropriate.    Medications:    Current Outpatient Medications:     atorvastatin (LIPITOR) 20 MG tablet, Take 1 tablet (20 mg total) by mouth once daily., Disp: 90 tablet, Rfl: 3    blood sugar diagnostic (TRUE METRIX GLUCOSE TEST STRIP) Strp, 1 strip by skin prick route 3 (three) times daily., Disp: 100 strip, Rfl: 11    gabapentin (NEURONTIN) 100 MG capsule, Take 1 capsule (100 mg total) by mouth every 8 (eight) hours as needed (pain)., Disp: 90 capsule, Rfl: 11    insulin detemir U-100, Levemir, 100 unit/mL (3 mL) SubQ InPn pen, Inject 20 Units into the skin every evening., Disp: 6 mL, Rfl: 11    insulin syringe-needle U-100 (BD INSULIN SYRINGE) 1 mL 25 gauge x 5/8" Syrg, Inject insulin nightly, Disp: 100 each, Rfl: 0    metFORMIN (GLUCOPHAGE) 1000 MG tablet, Take 1 tablet (1,000 mg total) by mouth 2 (two) times daily with meals., Disp: 60 tablet, Rfl: 11    metoprolol tartrate (LOPRESSOR) 25 MG tablet, Take 0.5 tablets " "(12.5 mg total) by mouth 2 (two) times daily., Disp: 30 tablet, Rfl: 5    pen needle, diabetic (BD ULTRA-FINE SHORT PEN NEEDLE) 31 gauge x 5/16" Ndle, Inject insulin nightly, Disp: 100 each, Rfl: 1    TRUE METRIX GLUCOSE METER Misc, USE TO TEST BLOOD SUGAR TWICE DAILY, Disp: , Rfl:     TRUEPLUS LANCETS 33 gauge Misc, 1 lancet by skin prick route 3 (three) times daily. Use to test blood glucose, Disp: 100 each, Rfl: 11    losartan (COZAAR) 100 MG tablet, Take 1 tablet (100 mg total) by mouth once daily., Disp: 30 tablet, Rfl: 2    NIFEdipine (PROCARDIA-XL) 60 MG (OSM) 24 hr tablet, Take 1 tablet (60 mg total) by mouth once daily., Disp: 30 tablet, Rfl: 2     Health Maintenance:    There is no immunization history on file for this patient.   Health Maintenance   Topic Date Due    Hepatitis C Screening  Never done    Eye Exam  Never done    TETANUS VACCINE  Never done    Colorectal Cancer Screening  Never done    Shingles Vaccine (1 of 2) Never done    Hemoglobin A1c  01/07/2024    Lipid Panel  04/14/2024    Foot Exam  04/19/2024    Low Dose Statin  09/29/2024     Health Maintenance Topics with due status: Not Due       Topic Last Completion Date    Diabetes Urine Screening 04/14/2023    Lipid Panel 04/14/2023    Foot Exam 04/19/2023    Hemoglobin A1c 07/07/2023    Low Dose Statin 09/29/2023     Health Maintenance Due   Topic Date Due    Hepatitis C Screening  Never done    COVID-19 Vaccine (1) Never done    Pneumococcal Vaccines (Age 0-64) (1 - PCV) Never done    Eye Exam  Never done    HIV Screening  Never done    TETANUS VACCINE  Never done    Colorectal Cancer Screening  Never done    Shingles Vaccine (1 of 2) Never done    Influenza Vaccine (1) Never done       PHYSICAL EXAM:  Vitals:    09/29/23 1505   BP: 122/63   BP Location: Left arm   Patient Position: Sitting   BP Method: Large (Automatic)   Pulse: 71   Resp: 16   Temp: 98.1 °F (36.7 °C)   SpO2: 95%   Weight: 102.1 kg (225 lb)   Height: 5' 11" (1.803 m) "     Body mass index is 31.38 kg/m².  Physical Exam  Vitals and nursing note reviewed.   Constitutional:       General: He is not in acute distress.     Appearance: Normal appearance. He is not toxic-appearing.   HENT:      Head: Normocephalic and atraumatic.      Right Ear: External ear normal.      Left Ear: External ear normal.      Nose: Nose normal.      Mouth/Throat:      Mouth: Mucous membranes are moist.      Pharynx: Oropharynx is clear.   Eyes:      Extraocular Movements: Extraocular movements intact.      Conjunctiva/sclera: Conjunctivae normal.   Cardiovascular:      Rate and Rhythm: Normal rate and regular rhythm.      Pulses: Normal pulses.      Heart sounds: Normal heart sounds. No murmur heard.  Pulmonary:      Effort: Pulmonary effort is normal. No respiratory distress.      Breath sounds: Normal breath sounds. No wheezing or rales.   Abdominal:      General: Abdomen is flat. There is no distension.      Palpations: Abdomen is soft. There is no mass.      Tenderness: There is no abdominal tenderness. There is no right CVA tenderness, left CVA tenderness or guarding.   Musculoskeletal:         General: No swelling or tenderness.      Cervical back: Normal range of motion and neck supple. No rigidity.      Right lower leg: No edema.      Left lower leg: No edema.      Comments: Left index finger amputated, well healed scar, Right foot in walking boot, foot wrapped in dressing, clean and dry   Skin:     General: Skin is warm and dry.      Capillary Refill: Capillary refill takes less than 2 seconds.   Neurological:      General: No focal deficit present.      Mental Status: He is alert and oriented to person, place, and time. Mental status is at baseline.      Motor: No weakness.      Gait: Gait normal.   Psychiatric:         Mood and Affect: Mood normal.         Behavior: Behavior normal.         Thought Content: Thought content normal.         Judgment: Judgment normal.        ASSESSMENT/PLAN:  1.  Type 2 diabetes mellitus with hyperglycemia, unspecified whether long term insulin use  Overview:  Elevated HgbA1c initially.  Improved 7/7/23 to 6.3.  Neurontin for nerve pain.    Orders:  -     Diabetes Digital Medicine (DDMP) Enrollment Order  -     Diabetes Digital Medicine (DDMP): Assign Onboarding Questionnaires    2. Primary hypertension  Assessment & Plan:  BP Readings from Last 3 Encounters:   09/29/23 122/63   09/27/23 134/72   09/20/23 127/68   Normotensive.   - increase procardia ER 60 mg from 30 mg daily  - continue losartan 100 mg  - continue metoprolol 12.5 mg BID  - f/u 4 weeks     Orders:  -     Hypertension Digital Medicine (HDMP) Enrollment Order  -     Hypertension Digital Medicine (HDMP): Assign Onboarding Questionnaires    3. Hypercholesterolemia with hyperglyceridemia  -     Lipids Digital Medicine (LDMP) Enrollment Order  -     Lipids Digital Medicine (LDMP): Assign Onboarding Questionnaires    4. Anemia, unspecified type  Assessment & Plan:  Continue with daily iron supplementation. Incorporate daily fiber to avoid constipation.   Encouraged increase daily hydration and may add daily stool softener. Counseled on food items to incorporate in diet.   - wheat germ, dried fruits, nuts and seeds, whole grain and fortified breads and cereals, dried beans, lentils, and split peas, leafy, dark-green vegetables, eggs  - continue daily iron supplementation, take with small glass of orange juice  - f/u 4-6 weeks with labs (cbc, cmp, iron panel, vit B12, folate, retic)       5. Dyslipidemia associated with type 2 diabetes mellitus  Overview:  Diagnosed in 4/2023  Current diabetic medications:  - metformin 1000 mg daily  - basal insulin 20U QHS  Known diabetic complications: poor wound healing s/p 5th metatarsal amputation on 4/7/23  Weight trend: stable  Blood glucose monitoring at home: Not started yet, POC glucose with glucometer QACHS  Statin: Taking  ACE/ARB: Taking     Screening or Prevention  Patient's value   HgA1C Testing and Control   Lab Results   Component Value Date    HGBA1C 6.3 (H) 07/07/2023        Lipid profile : 04/14/2023   LDL control Lab Results   Component Value Date    LDLCALC 75.4 04/14/2023      Nephropathy screening Lab Results   Component Value Date    MICALBCREAT 711.7 (H) 04/14/2023      Blood pressure BP Readings from Last 1 Encounters:   09/29/23 122/63      Dilated retinal exam Most Recent Eye Exam Date: Not Found   Foot exam : 04/19/2023       Assessment & Plan:  Lab Results   Component Value Date    HGBA1C 6.3 (H) 07/07/2023    HGBA1C 12.8 (H) 04/04/2023           6. Class 1 obesity with serious comorbidity and body mass index (BMI) of 31.0 to 31.9 in adult, unspecified obesity type  Overview:  Wt Readings from Last 8 Encounters:   09/29/23 102.1 kg (225 lb)   06/29/23 103.4 kg (228 lb)   04/27/23 108 kg (238 lb)   04/14/23 110.7 kg (244 lb)   04/04/23 111 kg (244 lb 11.4 oz)   04/04/23 106.6 kg (235 lb)   04/04/23 106.6 kg (235 lb 0.2 oz)       Assessment & Plan:  Stable weight.   Recommendations:   Stay physically active. As tolerated alternate resistance training with stretching and cardio. Goal of 150 minutes per week of moderate intensity activity or 7,500 - 10,000 steps per day. Follow the Mediterranean Diet. Include whole fresh fruits, vegetables, olive oil, seeds, nuts, whole grains, cold water fish, salmon, mackerel and lean cuts of meat.  Do not drink sugary/diet carbonated beverages. Decrease portion sizes slightly which will result in an approximately 500-calorie deficit. Avoid fast or fried and processed food, especially canned foods. Avoid refined carbohydrates, white starchy foods, flour, white potato, bread, muffins, and cakes. Consider substituting one meal a day with a meal replacement such as Slim fast, lean cuisine, or weight watcher's. Follow a healthy diet that includes enough calcium, vitamin D and proteins for bone health.            Other than changes  above, continue current medications and maintain follow up with specialists.      No follow-ups on file.   No results found for this or any previous visit (from the past 2016 hour(s)).      Kazumi G Yoshinaga, DO [facility]

## 2023-10-04 ENCOUNTER — PATIENT OUTREACH (OUTPATIENT)
Dept: ADMINISTRATIVE | Facility: HOSPITAL | Age: 53
End: 2023-10-04
Payer: MEDICAID

## 2023-10-09 ENCOUNTER — PATIENT OUTREACH (OUTPATIENT)
Dept: ADMINISTRATIVE | Facility: HOSPITAL | Age: 53
End: 2023-10-09
Payer: MEDICAID

## 2023-10-09 DIAGNOSIS — E11.65 TYPE 2 DIABETES MELLITUS WITH HYPERGLYCEMIA, UNSPECIFIED WHETHER LONG TERM INSULIN USE: Primary | ICD-10-CM

## 2023-10-09 NOTE — PROGRESS NOTES
Chart reviewed, no Links immunization record noted.  No new results noted on [facility] or [facility] web site.  Care Everywhere updated.   Patient care coordination note  Upcoming PCP visit updated.  Next PCP visit 4/1/2024.  Spoke to patient, discuss Eyecam and Colorectal Cancer Screening with patient. Patient refused both screenings and states he will discuss with the doctor.

## 2023-10-18 ENCOUNTER — CLINICAL SUPPORT (OUTPATIENT)
Dept: WOUND CARE | Facility: HOSPITAL | Age: 53
End: 2023-10-18
Attending: SURGERY
Payer: MEDICAID

## 2023-10-18 VITALS
SYSTOLIC BLOOD PRESSURE: 119 MMHG | RESPIRATION RATE: 18 BRPM | DIASTOLIC BLOOD PRESSURE: 65 MMHG | TEMPERATURE: 99 F | HEART RATE: 70 BPM

## 2023-10-18 DIAGNOSIS — L97.514 DIABETIC ULCER OF TOE OF RIGHT FOOT ASSOCIATED WITH TYPE 2 DIABETES MELLITUS, WITH NECROSIS OF BONE: ICD-10-CM

## 2023-10-18 DIAGNOSIS — E11.65 TYPE 2 DIABETES MELLITUS WITH HYPERGLYCEMIA, UNSPECIFIED WHETHER LONG TERM INSULIN USE: ICD-10-CM

## 2023-10-18 DIAGNOSIS — E11.621 DIABETIC ULCER OF TOE OF RIGHT FOOT ASSOCIATED WITH TYPE 2 DIABETES MELLITUS, WITH NECROSIS OF BONE: ICD-10-CM

## 2023-10-18 DIAGNOSIS — L97.516 NON-PRESSURE CHRONIC ULCER OF OTHER PART OF RIGHT FOOT WITH BONE INVOLVEMENT WITHOUT EVIDENCE OF NECROSIS: Primary | ICD-10-CM

## 2023-10-18 PROCEDURE — 11042 DBRDMT SUBQ TIS 1ST 20SQCM/<: CPT

## 2023-10-20 ENCOUNTER — PATIENT OUTREACH (OUTPATIENT)
Dept: ADMINISTRATIVE | Facility: HOSPITAL | Age: 53
End: 2023-10-20
Payer: MEDICAID

## 2023-10-20 NOTE — PROGRESS NOTES
[facility]  Wound Care  Progress Note        Problem List Items Addressed This Visit          Endocrine    Type 2 diabetes mellitus with hyperglycemia    Overview     Elevated HgbA1c initially.  Improved 7/7/23 to 6.3.  Neurontin for nerve pain.         Diabetic ulcer of toe of right foot associated with type 2 diabetes mellitus, with necrosis of bone    Overview     Presented initially s/p amputation with open wound to amputation site. HgbA1c 6.3 last check on 7/7/23.  Recent order from April was not drawn.  Needs better control for healing. Current wound measures 0.3x0.5x0.2 cm to right lateral foot at transmetatarsal amputation of 5th digit and wraps around side of foot with a punctate open area of granulation superiorly.  Length that increased last visit improved again with re-epithelialization bridging across wound and small granulation sinus as satellite lesion.  Debrided with curette.     Apply alginate.  Daily changes.  FU next week.              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 Documents will be scanned.        Kori Cruz  [facility]

## 2023-10-24 ENCOUNTER — TELEPHONE (OUTPATIENT)
Dept: PRIMARY CARE CLINIC | Facility: CLINIC | Age: 53
End: 2023-10-24
Payer: MEDICAID

## 2023-10-24 VITALS — DIASTOLIC BLOOD PRESSURE: 80 MMHG | SYSTOLIC BLOOD PRESSURE: 136 MMHG

## 2023-10-25 DIAGNOSIS — I10 PRIMARY HYPERTENSION: ICD-10-CM

## 2023-11-01 ENCOUNTER — OFFICE VISIT (OUTPATIENT)
Dept: WOUND CARE | Facility: HOSPITAL | Age: 53
End: 2023-11-01
Attending: SURGERY
Payer: MEDICAID

## 2023-11-01 VITALS
HEART RATE: 72 BPM | TEMPERATURE: 98 F | DIASTOLIC BLOOD PRESSURE: 78 MMHG | RESPIRATION RATE: 19 BRPM | SYSTOLIC BLOOD PRESSURE: 129 MMHG

## 2023-11-01 DIAGNOSIS — E11.65 TYPE 2 DIABETES MELLITUS WITH HYPERGLYCEMIA, UNSPECIFIED WHETHER LONG TERM INSULIN USE: ICD-10-CM

## 2023-11-01 DIAGNOSIS — L97.516 NON-PRESSURE CHRONIC ULCER OF OTHER PART OF RIGHT FOOT WITH BONE INVOLVEMENT WITHOUT EVIDENCE OF NECROSIS: Primary | ICD-10-CM

## 2023-11-01 DIAGNOSIS — T81.31XD DISRUPTION OF EXTERNAL SURGICAL WOUND, SUBSEQUENT ENCOUNTER: ICD-10-CM

## 2023-11-01 PROCEDURE — 99213 OFFICE O/P EST LOW 20 MIN: CPT

## 2023-11-02 PROBLEM — E11.621 DIABETIC ULCER OF TOE OF RIGHT FOOT ASSOCIATED WITH TYPE 2 DIABETES MELLITUS, WITH NECROSIS OF BONE: Status: RESOLVED | Noted: 2023-05-19 | Resolved: 2023-11-02

## 2023-11-02 PROBLEM — L97.514 DIABETIC ULCER OF TOE OF RIGHT FOOT ASSOCIATED WITH TYPE 2 DIABETES MELLITUS, WITH NECROSIS OF BONE: Status: RESOLVED | Noted: 2023-05-19 | Resolved: 2023-11-02

## 2023-11-02 NOTE — PROGRESS NOTES
[facility]  Wound Care  Progress Note        Problem List Items Addressed This Visit          Endocrine    Type 2 diabetes mellitus with hyperglycemia    Overview     Elevated HgbA1c initially.  Improved 7/7/23 to 6.3.  Neurontin for nerve pain.            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 HgbA1c 6.3 last check on 7/7/23.  Recent order from April was not drawn.  Needs better control for healing. Current wound covered with eschar but healed with removal.  No debridement necessary.    Keep clean and dry.  Cover with dressing.  FU next week for surveillance.          See wound doc progress notes. Documents will be scanned.        Kori Cruz  [facility]

## 2023-11-03 ENCOUNTER — TELEPHONE (OUTPATIENT)
Dept: PRIMARY CARE CLINIC | Facility: CLINIC | Age: 53
End: 2023-11-03
Payer: MEDICAID

## 2023-11-06 RX ORDER — NIFEDIPINE 60 MG/1
60 TABLET, EXTENDED RELEASE ORAL
Qty: 30 TABLET | Refills: 0 | Status: SHIPPED | OUTPATIENT
Start: 2023-11-06 | End: 2023-12-10

## 2023-11-06 RX ORDER — LOSARTAN POTASSIUM 100 MG/1
100 TABLET ORAL
Qty: 30 TABLET | Refills: 0 | Status: SHIPPED | OUTPATIENT
Start: 2023-11-06 | End: 2024-01-06

## 2023-11-15 ENCOUNTER — OFFICE VISIT (OUTPATIENT)
Dept: WOUND CARE | Facility: HOSPITAL | Age: 53
End: 2023-11-15
Attending: SURGERY
Payer: MEDICAID

## 2023-11-15 VITALS
SYSTOLIC BLOOD PRESSURE: 130 MMHG | HEART RATE: 70 BPM | DIASTOLIC BLOOD PRESSURE: 80 MMHG | TEMPERATURE: 98 F | RESPIRATION RATE: 18 BRPM

## 2023-11-15 DIAGNOSIS — E11.65 TYPE 2 DIABETES MELLITUS WITH HYPERGLYCEMIA, UNSPECIFIED WHETHER LONG TERM INSULIN USE: ICD-10-CM

## 2023-11-15 DIAGNOSIS — T81.31XD DISRUPTION OF EXTERNAL SURGICAL WOUND, SUBSEQUENT ENCOUNTER: ICD-10-CM

## 2023-11-15 DIAGNOSIS — L97.516 NON-PRESSURE CHRONIC ULCER OF OTHER PART OF RIGHT FOOT WITH BONE INVOLVEMENT WITHOUT EVIDENCE OF NECROSIS: Primary | ICD-10-CM

## 2023-11-15 PROCEDURE — 17250 CHEM CAUT OF GRANLTJ TISSUE: CPT

## 2023-11-28 NOTE — PROGRESS NOTES
[facility]  Wound Care  Progress Note        Problem List Items Addressed This Visit          Endocrine    Type 2 diabetes mellitus with hyperglycemia    Overview     Elevated HgbA1c initially.  Improved 7/7/23 to 6.3.  Neurontin for nerve pain.            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 HgbA1c 6.3 last check on 7/7/23.  Recent order from April was not drawn.  Needs better control for healing. Current wound covered with eschar but small opening at superior wound near dorsum of foot.  No debridement necessary.    Silver nitrate for stringy granulation.  Keep clean and dry.  Cover with dressing.  FU next week for surveillance.          See wound doc progress notes. Documents will be scanned.        Kori Cruz  [facility]

## 2023-12-06 ENCOUNTER — OFFICE VISIT (OUTPATIENT)
Dept: WOUND CARE | Facility: HOSPITAL | Age: 53
End: 2023-12-06
Attending: SURGERY
Payer: MEDICAID

## 2023-12-06 VITALS
DIASTOLIC BLOOD PRESSURE: 85 MMHG | SYSTOLIC BLOOD PRESSURE: 133 MMHG | TEMPERATURE: 99 F | HEART RATE: 75 BPM | RESPIRATION RATE: 18 BRPM

## 2023-12-06 DIAGNOSIS — L97.516 NON-PRESSURE CHRONIC ULCER OF OTHER PART OF RIGHT FOOT WITH BONE INVOLVEMENT WITHOUT EVIDENCE OF NECROSIS: Primary | ICD-10-CM

## 2023-12-06 DIAGNOSIS — T81.31XD DISRUPTION OF EXTERNAL SURGICAL WOUND, SUBSEQUENT ENCOUNTER: ICD-10-CM

## 2023-12-06 DIAGNOSIS — L97.514 DIABETIC ULCER OF TOE OF RIGHT FOOT ASSOCIATED WITH TYPE 2 DIABETES MELLITUS, WITH NECROSIS OF BONE: ICD-10-CM

## 2023-12-06 DIAGNOSIS — E11.65 TYPE 2 DIABETES MELLITUS WITH HYPERGLYCEMIA, UNSPECIFIED WHETHER LONG TERM INSULIN USE: ICD-10-CM

## 2023-12-06 DIAGNOSIS — E11.621 DIABETIC ULCER OF TOE OF RIGHT FOOT ASSOCIATED WITH TYPE 2 DIABETES MELLITUS, WITH NECROSIS OF BONE: ICD-10-CM

## 2023-12-06 PROCEDURE — 17250 CHEM CAUT OF GRANLTJ TISSUE: CPT

## 2023-12-10 RX ORDER — NIFEDIPINE 60 MG/1
60 TABLET, EXTENDED RELEASE ORAL
Qty: 30 TABLET | Refills: 0 | Status: SHIPPED | OUTPATIENT
Start: 2023-12-10

## 2023-12-10 RX ORDER — METOPROLOL TARTRATE 25 MG/1
12.5 TABLET, FILM COATED ORAL 2 TIMES DAILY
Qty: 30 TABLET | Refills: 0 | Status: SHIPPED | OUTPATIENT
Start: 2023-12-10

## 2023-12-20 ENCOUNTER — OFFICE VISIT (OUTPATIENT)
Dept: WOUND CARE | Facility: HOSPITAL | Age: 53
End: 2023-12-20
Attending: SURGERY
Payer: MEDICAID

## 2023-12-20 VITALS
SYSTOLIC BLOOD PRESSURE: 129 MMHG | HEART RATE: 76 BPM | DIASTOLIC BLOOD PRESSURE: 84 MMHG | RESPIRATION RATE: 18 BRPM | TEMPERATURE: 99 F

## 2023-12-20 DIAGNOSIS — T81.31XD DISRUPTION OF EXTERNAL SURGICAL WOUND, SUBSEQUENT ENCOUNTER: ICD-10-CM

## 2023-12-20 DIAGNOSIS — L97.516 NON-PRESSURE CHRONIC ULCER OF OTHER PART OF RIGHT FOOT WITH BONE INVOLVEMENT WITHOUT EVIDENCE OF NECROSIS: Primary | ICD-10-CM

## 2023-12-20 DIAGNOSIS — E11.65 TYPE 2 DIABETES MELLITUS WITH HYPERGLYCEMIA, UNSPECIFIED WHETHER LONG TERM INSULIN USE: ICD-10-CM

## 2023-12-20 PROCEDURE — 99212 OFFICE O/P EST SF 10 MIN: CPT

## 2024-01-03 ENCOUNTER — OFFICE VISIT (OUTPATIENT)
Dept: WOUND CARE | Facility: HOSPITAL | Age: 54
End: 2024-01-03
Attending: SURGERY
Payer: MEDICAID

## 2024-01-03 VITALS
RESPIRATION RATE: 18 BRPM | TEMPERATURE: 98 F | DIASTOLIC BLOOD PRESSURE: 79 MMHG | HEART RATE: 78 BPM | SYSTOLIC BLOOD PRESSURE: 127 MMHG

## 2024-01-03 DIAGNOSIS — E11.65 TYPE 2 DIABETES MELLITUS WITH HYPERGLYCEMIA, UNSPECIFIED WHETHER LONG TERM INSULIN USE: ICD-10-CM

## 2024-01-03 DIAGNOSIS — L97.516 NON-PRESSURE CHRONIC ULCER OF OTHER PART OF RIGHT FOOT WITH BONE INVOLVEMENT WITHOUT EVIDENCE OF NECROSIS: Primary | ICD-10-CM

## 2024-01-03 DIAGNOSIS — T81.31XD DISRUPTION OF EXTERNAL SURGICAL WOUND, SUBSEQUENT ENCOUNTER: ICD-10-CM

## 2024-01-03 PROCEDURE — 99212 OFFICE O/P EST SF 10 MIN: CPT

## 2024-01-04 DIAGNOSIS — I10 PRIMARY HYPERTENSION: ICD-10-CM

## 2024-01-06 RX ORDER — LOSARTAN POTASSIUM 100 MG/1
100 TABLET ORAL
Qty: 30 TABLET | Refills: 0 | Status: SHIPPED | OUTPATIENT
Start: 2024-01-06 | End: 2024-02-06

## 2024-01-07 NOTE — PROGRESS NOTES
[facility]  Wound Care  Progress Note        Problem List Items Addressed This Visit          Endocrine    Type 2 diabetes mellitus with hyperglycemia    Overview     Elevated HgbA1c initially.  Improved 7/7/23 to 6.3.  Neurontin for nerve pain.            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 HgbA1c 6.3 last check on 7/7/23.  Recent order from April was not drawn.  Needs better control for healing. Current wound covered with eschar but small opening at superior wound near dorsum of foot.  No debridement necessary.    Silver nitrate for stringy granulation.  Keep clean and dry.  Cover with dressing.  FU next week for surveillance.            Other Visit Diagnoses       Diabetic ulcer of toe of right foot associated with type 2 diabetes mellitus, with necrosis of bone            See wound doc progress notes. Documents will be scanned.        Kori Cruz  [facility]

## 2024-01-08 NOTE — PROGRESS NOTES
[facility]  Wound Care  Progress Note        Problem List Items Addressed This Visit          Endocrine    Type 2 diabetes mellitus with hyperglycemia    Overview     Elevated HgbA1c initially.  Improved 7/7/23 to 6.3.  Neurontin for nerve pain.            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 HgbA1c 6.3 last check on 7/7/23.  Recent order from April was not drawn.  Needs better control for healing. Current wound covered with eschar but healed.  No debridement necessary.   Keep clean and dry.  Cover with dressing.  FU as needed..          See wound doc progress notes. Documents will be scanned.        Kori Cruz  [facility]

## 2024-01-23 ENCOUNTER — PATIENT OUTREACH (OUTPATIENT)
Dept: ADMINISTRATIVE | Facility: HOSPITAL | Age: 54
End: 2024-01-23
Payer: MEDICAID

## 2024-01-24 DIAGNOSIS — E11.9 TYPE 2 DIABETES MELLITUS WITHOUT COMPLICATION: ICD-10-CM

## 2024-02-02 DIAGNOSIS — I10 PRIMARY HYPERTENSION: ICD-10-CM

## 2024-02-06 RX ORDER — LOSARTAN POTASSIUM 100 MG/1
100 TABLET ORAL
Qty: 30 TABLET | Refills: 0 | Status: SHIPPED | OUTPATIENT
Start: 2024-02-06 | End: 2024-03-07

## 2024-03-05 DIAGNOSIS — I10 PRIMARY HYPERTENSION: ICD-10-CM

## 2024-03-07 RX ORDER — LOSARTAN POTASSIUM 100 MG/1
100 TABLET ORAL
Qty: 30 TABLET | Refills: 0 | Status: SHIPPED | OUTPATIENT
Start: 2024-03-07

## 2024-04-10 ENCOUNTER — OFFICE VISIT (OUTPATIENT)
Dept: PRIMARY CARE CLINIC | Facility: CLINIC | Age: 54
End: 2024-04-10
Payer: MEDICAID

## 2024-04-10 ENCOUNTER — LAB VISIT (OUTPATIENT)
Dept: LAB | Facility: HOSPITAL | Age: 54
End: 2024-04-10
Attending: STUDENT IN AN ORGANIZED HEALTH CARE EDUCATION/TRAINING PROGRAM
Payer: MEDICAID

## 2024-04-10 VITALS
SYSTOLIC BLOOD PRESSURE: 108 MMHG | WEIGHT: 218 LBS | HEART RATE: 61 BPM | HEIGHT: 71 IN | TEMPERATURE: 98 F | BODY MASS INDEX: 30.52 KG/M2 | OXYGEN SATURATION: 100 % | DIASTOLIC BLOOD PRESSURE: 63 MMHG | RESPIRATION RATE: 16 BRPM

## 2024-04-10 DIAGNOSIS — E11.65 TYPE 2 DIABETES MELLITUS WITH HYPERGLYCEMIA, UNSPECIFIED WHETHER LONG TERM INSULIN USE: ICD-10-CM

## 2024-04-10 DIAGNOSIS — E87.6 HYPOKALEMIA: ICD-10-CM

## 2024-04-10 DIAGNOSIS — D64.9 ANEMIA, UNSPECIFIED TYPE: ICD-10-CM

## 2024-04-10 DIAGNOSIS — Z53.20 COLON CANCER SCREENING DECLINED: ICD-10-CM

## 2024-04-10 DIAGNOSIS — I10 PRIMARY HYPERTENSION: ICD-10-CM

## 2024-04-10 DIAGNOSIS — E66.9 CLASS 1 OBESITY WITH SERIOUS COMORBIDITY AND BODY MASS INDEX (BMI) OF 31.0 TO 31.9 IN ADULT, UNSPECIFIED OBESITY TYPE: ICD-10-CM

## 2024-04-10 DIAGNOSIS — E11.69 DYSLIPIDEMIA ASSOCIATED WITH TYPE 2 DIABETES MELLITUS: ICD-10-CM

## 2024-04-10 DIAGNOSIS — Z12.5 PROSTATE CANCER SCREENING: ICD-10-CM

## 2024-04-10 DIAGNOSIS — E78.5 DYSLIPIDEMIA ASSOCIATED WITH TYPE 2 DIABETES MELLITUS: ICD-10-CM

## 2024-04-10 DIAGNOSIS — I10 PRIMARY HYPERTENSION: Primary | ICD-10-CM

## 2024-04-10 DIAGNOSIS — Z12.11 COLON CANCER SCREENING: ICD-10-CM

## 2024-04-10 DIAGNOSIS — R63.0 ANOREXIA: ICD-10-CM

## 2024-04-10 PROCEDURE — 83735 ASSAY OF MAGNESIUM: CPT | Performed by: STUDENT IN AN ORGANIZED HEALTH CARE EDUCATION/TRAINING PROGRAM

## 2024-04-10 PROCEDURE — 99214 OFFICE O/P EST MOD 30 MIN: CPT | Mod: S$PBB,,, | Performed by: STUDENT IN AN ORGANIZED HEALTH CARE EDUCATION/TRAINING PROGRAM

## 2024-04-10 PROCEDURE — 3074F SYST BP LT 130 MM HG: CPT | Mod: CPTII,,, | Performed by: STUDENT IN AN ORGANIZED HEALTH CARE EDUCATION/TRAINING PROGRAM

## 2024-04-10 PROCEDURE — 84439 ASSAY OF FREE THYROXINE: CPT | Performed by: STUDENT IN AN ORGANIZED HEALTH CARE EDUCATION/TRAINING PROGRAM

## 2024-04-10 PROCEDURE — 1159F MED LIST DOCD IN RCRD: CPT | Mod: CPTII,,, | Performed by: STUDENT IN AN ORGANIZED HEALTH CARE EDUCATION/TRAINING PROGRAM

## 2024-04-10 PROCEDURE — 84153 ASSAY OF PSA TOTAL: CPT | Performed by: STUDENT IN AN ORGANIZED HEALTH CARE EDUCATION/TRAINING PROGRAM

## 2024-04-10 PROCEDURE — 84443 ASSAY THYROID STIM HORMONE: CPT | Performed by: STUDENT IN AN ORGANIZED HEALTH CARE EDUCATION/TRAINING PROGRAM

## 2024-04-10 PROCEDURE — 4010F ACE/ARB THERAPY RXD/TAKEN: CPT | Mod: CPTII,,, | Performed by: STUDENT IN AN ORGANIZED HEALTH CARE EDUCATION/TRAINING PROGRAM

## 2024-04-10 PROCEDURE — 80061 LIPID PANEL: CPT | Performed by: STUDENT IN AN ORGANIZED HEALTH CARE EDUCATION/TRAINING PROGRAM

## 2024-04-10 PROCEDURE — 36415 COLL VENOUS BLD VENIPUNCTURE: CPT | Performed by: STUDENT IN AN ORGANIZED HEALTH CARE EDUCATION/TRAINING PROGRAM

## 2024-04-10 PROCEDURE — 82043 UR ALBUMIN QUANTITATIVE: CPT | Performed by: STUDENT IN AN ORGANIZED HEALTH CARE EDUCATION/TRAINING PROGRAM

## 2024-04-10 PROCEDURE — 99999 PR PBB SHADOW E&M-EST. PATIENT-LVL IV: CPT | Mod: PBBFAC,,, | Performed by: STUDENT IN AN ORGANIZED HEALTH CARE EDUCATION/TRAINING PROGRAM

## 2024-04-10 PROCEDURE — 3008F BODY MASS INDEX DOCD: CPT | Mod: CPTII,,, | Performed by: STUDENT IN AN ORGANIZED HEALTH CARE EDUCATION/TRAINING PROGRAM

## 2024-04-10 PROCEDURE — 1160F RVW MEDS BY RX/DR IN RCRD: CPT | Mod: CPTII,,, | Performed by: STUDENT IN AN ORGANIZED HEALTH CARE EDUCATION/TRAINING PROGRAM

## 2024-04-10 PROCEDURE — 99214 OFFICE O/P EST MOD 30 MIN: CPT | Mod: PBBFAC | Performed by: STUDENT IN AN ORGANIZED HEALTH CARE EDUCATION/TRAINING PROGRAM

## 2024-04-10 PROCEDURE — 80053 COMPREHEN METABOLIC PANEL: CPT | Performed by: STUDENT IN AN ORGANIZED HEALTH CARE EDUCATION/TRAINING PROGRAM

## 2024-04-10 PROCEDURE — 83036 HEMOGLOBIN GLYCOSYLATED A1C: CPT | Performed by: STUDENT IN AN ORGANIZED HEALTH CARE EDUCATION/TRAINING PROGRAM

## 2024-04-10 PROCEDURE — 83540 ASSAY OF IRON: CPT | Performed by: STUDENT IN AN ORGANIZED HEALTH CARE EDUCATION/TRAINING PROGRAM

## 2024-04-10 PROCEDURE — 3078F DIAST BP <80 MM HG: CPT | Mod: CPTII,,, | Performed by: STUDENT IN AN ORGANIZED HEALTH CARE EDUCATION/TRAINING PROGRAM

## 2024-04-10 PROCEDURE — 85025 COMPLETE CBC W/AUTO DIFF WBC: CPT | Performed by: STUDENT IN AN ORGANIZED HEALTH CARE EDUCATION/TRAINING PROGRAM

## 2024-04-10 PROCEDURE — 82728 ASSAY OF FERRITIN: CPT | Performed by: STUDENT IN AN ORGANIZED HEALTH CARE EDUCATION/TRAINING PROGRAM

## 2024-04-10 RX ORDER — NIFEDIPINE 30 MG/1
30 TABLET, EXTENDED RELEASE ORAL DAILY
Qty: 30 TABLET | Refills: 2 | Status: SHIPPED | OUTPATIENT
Start: 2024-04-10 | End: 2024-07-09

## 2024-04-11 PROBLEM — Z12.11 COLON CANCER SCREENING: Status: ACTIVE | Noted: 2023-06-29

## 2024-04-11 NOTE — PROGRESS NOTES
"[facility] Note    HPI:  Willam Laurent is a 54 y.o. male who presents today for Follow-up (Pt here for 6 mo f/u)  54 year old with hypertension, diabetes mellitus type 2, hyperlipidemia, iron deficiency anemia, diabetic foot ulcer s/p amputation of the toe in right foot presents for follow up.   Complains of blood pressure readings "bottoming out," SBP dropping into 80-90s mmHg and feeling dizziness and blurred vision and generalized weakness.    Denies loss of consciousness, chest pain, palpitations, diaphoresis, shortness of breath, headaches, nausea, vomiting.    Foot wound has healed and patient no longer requiring wound care follow up.   Patient still requires eye exam appointment with optometry.   POC glucose at home fasting glucose 80-90s, per patient glucose readings does not exceed >140, while on basal insulin and metformin.   Denies fever, chills, abdominal pain, nausea, vomiting, diarrhea, constipation.     ROS   A review of systems was performed and was negative except as noted above.    I personally reviewed allergies, past medical, surgical, social and family history and updated as appropriate.    Medications:    Current Outpatient Medications:     atorvastatin (LIPITOR) 20 MG tablet, Take 1 tablet (20 mg total) by mouth once daily., Disp: 90 tablet, Rfl: 3    blood sugar diagnostic (TRUE METRIX GLUCOSE TEST STRIP) Strp, 1 strip by skin prick route 3 (three) times daily., Disp: 100 strip, Rfl: 11    gabapentin (NEURONTIN) 100 MG capsule, Take 1 capsule (100 mg total) by mouth every 8 (eight) hours as needed (pain)., Disp: 90 capsule, Rfl: 11    insulin detemir U-100, Levemir, 100 unit/mL (3 mL) SubQ InPn pen, Inject 20 Units into the skin every evening., Disp: 6 mL, Rfl: 11    insulin syringe-needle U-100 (BD INSULIN SYRINGE) 1 mL 25 gauge x 5/8" Syrg, Inject insulin nightly, Disp: 100 each, Rfl: 0    losartan (COZAAR) 100 MG tablet, Take 1 tablet by mouth once daily, Disp: 30 " "tablet, Rfl: 0    metFORMIN (GLUCOPHAGE) 1000 MG tablet, Take 1 tablet (1,000 mg total) by mouth 2 (two) times daily with meals., Disp: 60 tablet, Rfl: 11    metoprolol tartrate (LOPRESSOR) 25 MG tablet, Take 1/2 (one-half) tablet by mouth twice daily, Disp: 30 tablet, Rfl: 0    pen needle, diabetic (BD ULTRA-FINE SHORT PEN NEEDLE) 31 gauge x 5/16" Ndle, Inject insulin nightly, Disp: 100 each, Rfl: 1    TRUE METRIX GLUCOSE METER Misc, USE TO TEST BLOOD SUGAR TWICE DAILY, Disp: , Rfl:     TRUEPLUS LANCETS 33 gauge Misc, 1 lancet by skin prick route 3 (three) times daily. Use to test blood glucose, Disp: 100 each, Rfl: 11    NIFEdipine (PROCARDIA-XL) 30 MG (OSM) 24 hr tablet, Take 1 tablet (30 mg total) by mouth once daily., Disp: 30 tablet, Rfl: 2     Health Maintenance:    There is no immunization history on file for this patient.   Health Maintenance   Topic Date Due    Hepatitis C Screening  Never done    Eye Exam  Never done    TETANUS VACCINE  Never done    Colorectal Cancer Screening  Never done    Shingles Vaccine (1 of 2) Never done    Hemoglobin A1c  01/07/2024    Lipid Panel  04/14/2024    Foot Exam  04/19/2024    Low Dose Statin  04/10/2025     Health Maintenance Topics with due status: Not Due       Topic Last Completion Date    Low Dose Statin 04/10/2024     Health Maintenance Due   Topic Date Due    Hepatitis C Screening  Never done    COVID-19 Vaccine (1) Never done    Pneumococcal Vaccines (Age 0-64) (1 of 2 - PCV) Never done    Eye Exam  Never done    HIV Screening  Never done    TETANUS VACCINE  Never done    Colorectal Cancer Screening  Never done    Shingles Vaccine (1 of 2) Never done    Influenza Vaccine (1) Never done    Hemoglobin A1c  01/07/2024    Diabetes Urine Screening  04/14/2024    Lipid Panel  04/14/2024    Foot Exam  04/19/2024       PHYSICAL EXAM:  Vitals:    04/10/24 1307   BP: 108/63   BP Location: Right arm   Patient Position: Sitting   BP Method: Medium (Automatic)   Pulse: 61 " "  Resp: 16   Temp: 98 °F (36.7 °C)   TempSrc: Temporal   SpO2: 100%   Weight: 98.9 kg (218 lb)   Height: 5' 11" (1.803 m)     Body mass index is 30.4 kg/m².  Physical Exam  Vitals and nursing note reviewed.   Constitutional:       General: He is not in acute distress.     Appearance: Normal appearance. He is not toxic-appearing.   HENT:      Head: Normocephalic and atraumatic.      Right Ear: External ear normal.      Left Ear: External ear normal.      Nose: Nose normal.      Mouth/Throat:      Mouth: Mucous membranes are moist.      Pharynx: Oropharynx is clear.   Eyes:      Extraocular Movements: Extraocular movements intact.      Conjunctiva/sclera: Conjunctivae normal.   Cardiovascular:      Rate and Rhythm: Normal rate and regular rhythm.      Pulses: Normal pulses.      Heart sounds: Normal heart sounds. No murmur heard.  Pulmonary:      Effort: Pulmonary effort is normal. No respiratory distress.      Breath sounds: Normal breath sounds. No wheezing or rales.   Abdominal:      General: Abdomen is flat. There is no distension.      Palpations: Abdomen is soft. There is no mass.      Tenderness: There is no abdominal tenderness. There is no right CVA tenderness, left CVA tenderness or guarding.   Musculoskeletal:         General: No swelling or tenderness.      Cervical back: Normal range of motion and neck supple. No rigidity.      Right lower leg: No edema.      Left lower leg: No edema.      Comments: Left index finger amputated, well healed scar, Right foot in walking boot, foot wrapped in dressing, clean and dry   Skin:     General: Skin is warm and dry.      Capillary Refill: Capillary refill takes less than 2 seconds.   Neurological:      General: No focal deficit present.      Mental Status: He is alert and oriented to person, place, and time. Mental status is at baseline.      Motor: No weakness.      Gait: Gait normal.   Psychiatric:         Mood and Affect: Mood normal.         Behavior: Behavior " normal.         Thought Content: Thought content normal.         Judgment: Judgment normal.          ASSESSMENT/PLAN:  1. Primary hypertension  Assessment & Plan:  BP Readings from Last 3 Encounters:   04/10/24 108/63   01/03/24 127/79   12/20/23 129/84   Concerning home reported frequent SBP drop into 80-90s mmHg and associated generalized weakness.   - reduce procardia ER to 30 mg from 60 mg daily  - continue losartan 100 mg  - taper metoprolol 12.5 mg BID to discontinue after daily metoprolol 12.5 mg on day 8  - f/u one week for BP check and lab review    Orders:  -     NIFEdipine (PROCARDIA-XL) 30 MG (OSM) 24 hr tablet; Take 1 tablet (30 mg total) by mouth once daily.  Dispense: 30 tablet; Refill: 2  -     Comprehensive Metabolic Panel; Future; Expected date: 04/10/2024  -     Magnesium; Future; Expected date: 04/10/2024    2. Anorexia  Assessment & Plan:  One meal a day, having low energy and cannot work a full shift.   - f/u CMP, CBC, TSH    Orders:  -     TSH; Future; Expected date: 04/10/2024  -     T4, Free; Future; Expected date: 04/10/2024  -     CBC Auto Differential; Future; Expected date: 04/10/2024    3. Dyslipidemia associated with type 2 diabetes mellitus  Overview:  Diagnosed in 4/2023  Current diabetic medications:  - metformin 1000 mg daily  - basal insulin 20U QHS  Known diabetic complications: poor wound healing s/p 5th metatarsal amputation on 4/7/23  Weight trend: stable  Blood glucose monitoring at home: Not started yet, POC glucose with glucometer QACHS  Statin: Taking  ACE/ARB: Taking     Screening or Prevention Patient's value   HgA1C Testing and Control   Lab Results   Component Value Date    HGBA1C 6.3 (H) 07/07/2023        Lipid profile : 04/14/2023   LDL control Lab Results   Component Value Date    LDLCALC 75.4 04/14/2023      Nephropathy screening Lab Results   Component Value Date    MICALBCREAT 711.7 (H) 04/14/2023      Blood pressure BP Readings from Last 1 Encounters:   09/29/23  122/63      Dilated retinal exam Most Recent Eye Exam Date: Not Found   Foot exam : 04/19/2023       Orders:  -     Lipid Panel; Future; Expected date: 04/10/2024    4. Type 2 diabetes mellitus with hyperglycemia, unspecified whether long term insulin use  Overview:  Elevated HgbA1c initially.  Improved 7/7/23 to 6.3.  Neurontin for nerve pain.    Assessment & Plan:  Continue with metformin 1000 mg BID.   Continue with basal insulin.   - for renal protection and cardiovascular health, continue losartan and atorvastatin  - control serum glucose, BP control  - f/u A1C and urine microalbumin    Orders:  -     Hemoglobin A1C; Future; Expected date: 04/10/2024  -     Microalbumin/Creatinine Ratio, Urine; Future; Expected date: 04/10/2024    5. Prostate cancer screening  -     PSA, Screening; Future; Expected date: 04/10/2024    6. Class 1 obesity with serious comorbidity and body mass index (BMI) of 31.0 to 31.9 in adult, unspecified obesity type  Overview:  Wt Readings from Last 8 Encounters:   04/10/24 98.9 kg (218 lb)   09/29/23 102.1 kg (225 lb)   06/29/23 103.4 kg (228 lb)   04/27/23 108 kg (238 lb)   04/14/23 110.7 kg (244 lb)   04/04/23 111 kg (244 lb 11.4 oz)   04/04/23 106.6 kg (235 lb)   04/04/23 106.6 kg (235 lb 0.2 oz)       Assessment & Plan:  Recommendations:   Stay physically active. As tolerated alternate resistance training with stretching and cardio. Goal of 150 minutes per week of moderate intensity activity or 7,500 - 10,000 steps per day. Follow the Mediterranean Diet. Include whole fresh fruits, vegetables, olive oil, seeds, nuts, whole grains, cold water fish, salmon, mackerel and lean cuts of meat.  Do not drink sugary/diet carbonated beverages. Decrease portion sizes slightly which will result in an approximately 500-calorie deficit. Avoid fast or fried and processed food, especially canned foods. Avoid refined carbohydrates, white starchy foods, flour, white potato, bread, muffins, and cakes.  Consider substituting one meal a day with a meal replacement such as Slim fast, lean cuisine, or weight watcher's. Follow a healthy diet that includes enough calcium, vitamin D and proteins for bone health.        7. Anemia, unspecified type  Assessment & Plan:  Continue with daily iron supplementation. Incorporate daily fiber to avoid constipation.   Encouraged increase daily hydration and may add daily stool softener. Counseled on food items to incorporate in diet.   - wheat germ, dried fruits, nuts and seeds, whole grain and fortified breads and cereals, dried beans, lentils, and split peas, leafy, dark-green vegetables, eggs  - continue daily iron supplementation, take with small glass of orange juice  - f/u cbc, iron studies    Orders:  -     Ferritin; Future; Expected date: 04/10/2024  -     Iron and TIBC; Future; Expected date: 04/10/2024    8. Hypokalemia  Assessment & Plan:  Denies symptoms. Counseled on food items high in potassium to incorporate to diet.   - f/u one week with BMP    Orders:  -     Magnesium; Future; Expected date: 04/10/2024    9. Colon cancer screening  Assessment & Plan:  Agreeable to cologuard screening.   - f/u results      Orders:  -     Cologuard Screening (Multitarget Stool DNA); Future; Expected date: 04/10/2024    10. Colon cancer screening declined  Assessment & Plan:  Agreeable to cologuard screening.   - f/u results            Other than changes above, continue current medications and maintain follow up with specialists.      Follow up in about 1 week (around 4/17/2024) for BP check, Med recheck, Lab review.   No results found for this or any previous visit (from the past 2016 hour(s)).      Kazumi G Yoshinaga, DO [facility]

## 2024-04-11 NOTE — ASSESSMENT & PLAN NOTE
Continue with metformin 1000 mg BID.   Continue with basal insulin.   - for renal protection and cardiovascular health, continue losartan and atorvastatin  - control serum glucose, BP control  - f/u A1C and urine microalbumin

## 2024-04-11 NOTE — ASSESSMENT & PLAN NOTE
Continue with daily iron supplementation. Incorporate daily fiber to avoid constipation.   Encouraged increase daily hydration and may add daily stool softener. Counseled on food items to incorporate in diet.   - wheat germ, dried fruits, nuts and seeds, whole grain and fortified breads and cereals, dried beans, lentils, and split peas, leafy, dark-green vegetables, eggs  - continue daily iron supplementation, take with small glass of orange juice  - f/u cbc, iron studies

## 2024-04-11 NOTE — ASSESSMENT & PLAN NOTE
Denies symptoms. Counseled on food items high in potassium to incorporate to diet.   - f/u one week with BMP

## 2024-04-11 NOTE — ASSESSMENT & PLAN NOTE
BP Readings from Last 3 Encounters:   04/10/24 108/63   01/03/24 127/79   12/20/23 129/84   Concerning home reported frequent SBP drop into 80-90s mmHg and associated generalized weakness.   - reduce procardia ER to 30 mg from 60 mg daily  - continue losartan 100 mg  - taper metoprolol 12.5 mg BID to discontinue after daily metoprolol 12.5 mg on day 8  - f/u one week for BP check and lab review

## 2024-04-15 LAB
ALBUMIN SERPL BCP-MCNC: 3.9 G/DL (ref 3.5–5.2)
ALBUMIN/CREAT UR: 218.2 UG/MG (ref 0–30)
ALP SERPL-CCNC: 66 U/L (ref 55–135)
ALT SERPL W/O P-5'-P-CCNC: 19 U/L (ref 10–44)
ANION GAP SERPL CALC-SCNC: 4 MMOL/L (ref 3–11)
AST SERPL-CCNC: 16 U/L (ref 10–40)
BASOPHILS # BLD AUTO: 0.04 K/UL (ref 0–0.2)
BASOPHILS NFR BLD: 0.7 % (ref 0–1.9)
BILIRUB SERPL-MCNC: 0.7 MG/DL (ref 0.1–1)
BUN SERPL-MCNC: 20 MG/DL (ref 6–20)
CALCIUM SERPL-MCNC: 10.2 MG/DL (ref 8.7–10.5)
CHLORIDE SERPL-SCNC: 110 MMOL/L (ref 95–110)
CHOLEST SERPL-MCNC: 112 MG/DL (ref 120–199)
CHOLEST/HDLC SERPL: 1.8 {RATIO} (ref 2–5)
CO2 SERPL-SCNC: 25 MMOL/L (ref 23–29)
COMPLEXED PSA SERPL-MCNC: 0.35 NG/ML (ref 0–4)
CREAT SERPL-MCNC: 1.6 MG/DL (ref 0.5–1.4)
CREAT UR-MCNC: 291 MG/DL (ref 23–375)
DIFFERENTIAL METHOD BLD: ABNORMAL
EOSINOPHIL # BLD AUTO: 0.1 K/UL (ref 0–0.5)
EOSINOPHIL NFR BLD: 1.2 % (ref 0–8)
ERYTHROCYTE [DISTWIDTH] IN BLOOD BY AUTOMATED COUNT: 13.4 % (ref 11.5–14.5)
EST. GFR  (NO RACE VARIABLE): 50.6 ML/MIN/1.73 M^2
ESTIMATED AVG GLUCOSE: 120 MG/DL (ref 68–131)
FERRITIN SERPL-MCNC: 172 NG/ML (ref 20–300)
GLUCOSE SERPL-MCNC: 80 MG/DL (ref 70–110)
HBA1C MFR BLD: 5.8 % (ref 4–5.6)
HCT VFR BLD AUTO: 33.3 % (ref 40–54)
HDLC SERPL-MCNC: 64 MG/DL (ref 40–75)
HDLC SERPL: 57.1 % (ref 20–50)
HGB BLD-MCNC: 10.9 G/DL (ref 14–18)
IMM GRANULOCYTES # BLD AUTO: 0.01 K/UL (ref 0–0.04)
IMM GRANULOCYTES NFR BLD AUTO: 0.2 % (ref 0–0.5)
IRON SATN MFR SERPL: 18 % (ref 20–50)
IRON SERPL-MCNC: 50 UG/DL (ref 45–160)
LDLC SERPL CALC-MCNC: 35.4 MG/DL (ref 63–159)
LYMPHOCYTES # BLD AUTO: 1.3 K/UL (ref 1–4.8)
LYMPHOCYTES NFR BLD: 21.8 % (ref 18–48)
MAGNESIUM SERPL-MCNC: 1.8 MG/DL (ref 1.6–2.6)
MCH RBC QN AUTO: 25.9 PG (ref 27–31)
MCHC RBC AUTO-ENTMCNC: 32.7 G/DL (ref 32–36)
MCV RBC AUTO: 79 FL (ref 82–98)
MICROALBUMIN UR DL<=1MG/L-MCNC: 635 MG/L
MONOCYTES # BLD AUTO: 0.5 K/UL (ref 0.3–1)
MONOCYTES NFR BLD: 8 % (ref 4–15)
NEUTROPHILS # BLD AUTO: 3.9 K/UL (ref 1.8–7.7)
NEUTROPHILS NFR BLD: 68.1 % (ref 38–73)
NONHDLC SERPL-MCNC: 48 MG/DL
NRBC BLD-RTO: 0 /100 WBC
PLATELET # BLD AUTO: 253 K/UL (ref 150–450)
PMV BLD AUTO: 9.7 FL (ref 9.2–12.9)
POTASSIUM SERPL-SCNC: 4.4 MMOL/L (ref 3.5–5.1)
PROT SERPL-MCNC: 7.3 G/DL (ref 6–8.4)
RBC # BLD AUTO: 4.21 M/UL (ref 4.6–6.2)
SODIUM SERPL-SCNC: 139 MMOL/L (ref 136–145)
T4 FREE SERPL-MCNC: 1.18 NG/DL (ref 0.71–1.51)
TOTAL IRON BINDING CAPACITY: 275 UG/DL (ref 250–450)
TRIGL SERPL-MCNC: 63 MG/DL (ref 30–150)
TSH SERPL DL<=0.005 MIU/L-ACNC: 0.55 UIU/ML (ref 0.4–4)
WBC # BLD AUTO: 5.77 K/UL (ref 3.9–12.7)

## 2024-04-16 ENCOUNTER — PATIENT OUTREACH (OUTPATIENT)
Dept: ADMINISTRATIVE | Facility: HOSPITAL | Age: 54
End: 2024-04-16
Payer: MEDICAID

## 2024-04-29 ENCOUNTER — PATIENT OUTREACH (OUTPATIENT)
Dept: ADMINISTRATIVE | Facility: HOSPITAL | Age: 54
End: 2024-04-29
Payer: MEDICAID

## 2024-08-07 ENCOUNTER — TELEPHONE (OUTPATIENT)
Dept: PRIMARY CARE CLINIC | Facility: CLINIC | Age: 54
End: 2024-08-07
Payer: MEDICAID

## 2024-08-07 DIAGNOSIS — E78.5 DYSLIPIDEMIA ASSOCIATED WITH TYPE 2 DIABETES MELLITUS: ICD-10-CM

## 2024-08-07 DIAGNOSIS — E11.69 DYSLIPIDEMIA ASSOCIATED WITH TYPE 2 DIABETES MELLITUS: ICD-10-CM

## 2024-08-07 RX ORDER — ATORVASTATIN CALCIUM 20 MG/1
20 TABLET, FILM COATED ORAL DAILY
Qty: 90 TABLET | Refills: 3 | Status: SHIPPED | OUTPATIENT
Start: 2024-08-07 | End: 2025-08-07

## 2024-10-23 DIAGNOSIS — E11.9 TYPE 2 DIABETES MELLITUS WITHOUT COMPLICATION: ICD-10-CM

## 2025-01-13 ENCOUNTER — PATIENT MESSAGE (OUTPATIENT)
Dept: ADMINISTRATIVE | Facility: HOSPITAL | Age: 55
End: 2025-01-13
Payer: MEDICAID

## (undated) DEVICE — BANDAGE KERLIX AMD

## (undated) DEVICE — Device

## (undated) DEVICE — APPLICATOR CHLORAPREP ORN 26ML

## (undated) DEVICE — GOWN POLY REINF BRTH SLV XL

## (undated) DEVICE — GLOVE PROTEXIS LTX MICRO  7.5

## (undated) DEVICE — GLOVE PROTEXIS BLUE LATEX 8